# Patient Record
Sex: MALE | Race: WHITE | NOT HISPANIC OR LATINO | Employment: OTHER | URBAN - METROPOLITAN AREA
[De-identification: names, ages, dates, MRNs, and addresses within clinical notes are randomized per-mention and may not be internally consistent; named-entity substitution may affect disease eponyms.]

---

## 2017-01-06 ENCOUNTER — OFFICE VISIT (OUTPATIENT)
Dept: FAMILY MEDICINE | Facility: CLINIC | Age: 67
End: 2017-01-06
Payer: MEDICARE

## 2017-01-06 VITALS
DIASTOLIC BLOOD PRESSURE: 66 MMHG | WEIGHT: 299.19 LBS | TEMPERATURE: 98 F | HEIGHT: 68 IN | BODY MASS INDEX: 45.34 KG/M2 | HEART RATE: 88 BPM | SYSTOLIC BLOOD PRESSURE: 121 MMHG

## 2017-01-06 DIAGNOSIS — G89.29 CHRONIC HIP PAIN, UNSPECIFIED LATERALITY: ICD-10-CM

## 2017-01-06 DIAGNOSIS — M25.559 CHRONIC HIP PAIN, UNSPECIFIED LATERALITY: ICD-10-CM

## 2017-01-06 DIAGNOSIS — I15.2 HYPERTENSION ASSOCIATED WITH DIABETES: ICD-10-CM

## 2017-01-06 DIAGNOSIS — E78.5 HYPERLIPIDEMIA ASSOCIATED WITH TYPE 2 DIABETES MELLITUS: ICD-10-CM

## 2017-01-06 DIAGNOSIS — M54.50 CHRONIC BILATERAL LOW BACK PAIN WITHOUT SCIATICA: ICD-10-CM

## 2017-01-06 DIAGNOSIS — E66.01 OBESITY, CLASS III, BMI 40-49.9 (MORBID OBESITY): ICD-10-CM

## 2017-01-06 DIAGNOSIS — E11.59 HYPERTENSION ASSOCIATED WITH DIABETES: ICD-10-CM

## 2017-01-06 DIAGNOSIS — R10.31 RIGHT GROIN PAIN: ICD-10-CM

## 2017-01-06 DIAGNOSIS — E11.69 HYPERLIPIDEMIA ASSOCIATED WITH TYPE 2 DIABETES MELLITUS: ICD-10-CM

## 2017-01-06 DIAGNOSIS — G89.29 CHRONIC BILATERAL LOW BACK PAIN WITHOUT SCIATICA: ICD-10-CM

## 2017-01-06 DIAGNOSIS — R49.0 HOARSENESS: ICD-10-CM

## 2017-01-06 PROCEDURE — 3074F SYST BP LT 130 MM HG: CPT | Mod: S$GLB,,, | Performed by: FAMILY MEDICINE

## 2017-01-06 PROCEDURE — 4010F ACE/ARB THERAPY RXD/TAKEN: CPT | Mod: S$GLB,,, | Performed by: FAMILY MEDICINE

## 2017-01-06 PROCEDURE — 1125F AMNT PAIN NOTED PAIN PRSNT: CPT | Mod: S$GLB,,, | Performed by: FAMILY MEDICINE

## 2017-01-06 PROCEDURE — 3078F DIAST BP <80 MM HG: CPT | Mod: S$GLB,,, | Performed by: FAMILY MEDICINE

## 2017-01-06 PROCEDURE — 99214 OFFICE O/P EST MOD 30 MIN: CPT | Mod: S$GLB,,, | Performed by: FAMILY MEDICINE

## 2017-01-06 PROCEDURE — 2022F DILAT RTA XM EVC RTNOPTHY: CPT | Mod: S$GLB,,, | Performed by: FAMILY MEDICINE

## 2017-01-06 PROCEDURE — 1157F ADVNC CARE PLAN IN RCRD: CPT | Mod: S$GLB,,, | Performed by: FAMILY MEDICINE

## 2017-01-06 PROCEDURE — 99499 UNLISTED E&M SERVICE: CPT | Mod: S$GLB,,, | Performed by: FAMILY MEDICINE

## 2017-01-06 PROCEDURE — 1160F RVW MEDS BY RX/DR IN RCRD: CPT | Mod: S$GLB,,, | Performed by: FAMILY MEDICINE

## 2017-01-06 PROCEDURE — 1159F MED LIST DOCD IN RCRD: CPT | Mod: S$GLB,,, | Performed by: FAMILY MEDICINE

## 2017-01-06 PROCEDURE — 3045F PR MOST RECENT HEMOGLOBIN A1C LEVEL 7.0-9.0%: CPT | Mod: S$GLB,,, | Performed by: FAMILY MEDICINE

## 2017-01-06 PROCEDURE — 99999 PR PBB SHADOW E&M-EST. PATIENT-LVL III: CPT | Mod: PBBFAC,,, | Performed by: FAMILY MEDICINE

## 2017-01-06 RX ORDER — HYDROCODONE BITARTRATE AND ACETAMINOPHEN 7.5; 325 MG/1; MG/1
1 TABLET ORAL EVERY 6 HOURS PRN
Refills: 0 | COMMUNITY
Start: 2016-11-14 | End: 2017-01-06

## 2017-01-06 RX ORDER — LIDOCAINE 50 MG/G
1 PATCH TOPICAL DAILY
Qty: 30 PATCH | Refills: 11 | Status: SHIPPED | OUTPATIENT
Start: 2017-01-06 | End: 2018-05-28

## 2017-01-06 RX ORDER — TRAMADOL HYDROCHLORIDE 50 MG/1
50 TABLET ORAL EVERY 6 HOURS PRN
Qty: 60 TABLET | Refills: 0 | Status: SHIPPED | OUTPATIENT
Start: 2017-01-06 | End: 2017-01-16

## 2017-01-06 NOTE — PATIENT INSTRUCTIONS
Diabetes (General Information)  Diabetes is a long-term health problem. It means your body does not make enough insulin. Or it may mean that your body cannot use the insulin it makes. Insulin is a hormone in your body. It lets blood sugar (glucose) reach the cells in your body. All of your cells need glucose for fuel.  When you have diabetes, the glucose in your blood builds up because it cannot get into the cells. This buildup is called high blood sugar (hyperglycemia).  Your blood sugar level depends on several things. It depends on what kind of food you eat and how much of it you eat. It also depends on how much exercise you get, and how much insulin you have in your body. Eating too much of the wrong kinds of food or not taking diabetes medicine on time can cause high blood sugar. Infections can cause high blood sugar even if you are taking medicines correctly.  These things can also cause low blood sugar:  · Missing meals  · Not eating enough food  · Taking too much diabetes medicine  Diabetes can cause serious problems over time if you do not get treated. These problems include heart disease, stroke, kidney failure, and blindness. They also include nerve pain or loss of feeling in your legs and feet, and gangrene of the feet. By keeping your blood sugar under control you can prevent or delay these problems.  Normal blood sugar levels are 80 to 100 before a meal and less than 180 in the 1 to 2 hours after a meal.  Home care  Follow these guidelines when caring for yourself at home:  · Follow the diet your healthcare provider gives you. Take insulin or other diabetes medicine exactly as told to.  · Watch your blood sugar as you are told to. Keep a log of your results. This will help your provider change your medicines to keep your blood sugar under control.  · Try to reach your ideal weight. You may be able to cut back on or not have to take diabetes medicine if you eat the right foods and get exercise.  · Do  not smoke. Smoking worsens the effects of diabetes on your circulation. You are much more likely to have a heart attack if you have diabetes and you smoke.  · Take good care of your feet. If you have lost feeling in your feet, you may not see an injury or infection. Check your feet and between your toes at least once a week.  · Wear a medical alert bracelet or necklace, or carry a card in your wallet that says you have diabetes. This will help healthcare providers give you the right care if you get very ill and cannot tell them that you have diabetes.  Sick day plan  If you get a cold, the flu, or a bacterial or viral infection, take these steps:  · Look at your diabetes sick plan and call your healthcare provider as you were told to. You may need to call your provider right away if:  ¨ Your blood sugar is above 240 while taking your diabetes medicine  ¨ Your urine ketone levels are above normal or high  ¨ You have been vomiting more than 6 hours  ¨ You have trouble breathing or your breath ha s a fruity smell  ¨ You have a high fever  ¨ You have a fever for several days and you are not getting better  ¨ You get light-headed and are sleepier than usual  · Keep taking your diabetes pills (oral medicine) even if you have been vomiting and are feeling sick. Call your provider right away because you may need insulin to lower your blood sugar until you recover from your illness.  · Keep taking your insulin even if you have been vomiting and are feeling sick. Call your provider right away to ask if you need to change your insulin dose. This will depend on your blood sugar results.  · Check your blood sugar every 2 to 4 hours, or at least 4 times a day.  · Check your ketones often. If you are vomiting and having diarrhea, watch them more often.  · Do not skip meals. Try to eat small meals on a regular schedule. Do this even if you do not feel like eating.  · Drink water or other liquids that do not have caffeine or  calories. This will keep you from getting dehydrated. If you are nauseated or vomiting, takes small sips every 5 minutes. To prevent dehydration try to drink a cup (8 ounces) of fluids every hour while you are awake.  General care  Always bring a source of fast-acting sugar with you in case you have symptoms of low blood sugar (below 70). At the first sign of low blood sugar, eat or drink 15 to 20 grams of fast-acting sugar to raise your blood sugar. Examples are:  · 3 to 4 glucose tablets. You can buy these at most [facility].  · 4 ounces (1/2 cup) of regular (not diet) soft drinks  · 4 ounces (1/2 cup) of any fruit juice  · 8 ounces (1 cup) of milk  · 5 to 6 pieces of hard candy  · 1 tablespoon of honey  Check your blood sugar 15 minutes after treating yourself. If it is still below 70, take 15 to 20 more grams of fast-acting sugar. Test again in 15 minutes. If it returns to normal (70 or above), eat a snack or meal to keep your blood sugar in a safe range. If it stays low, call your doctor or go to an emergency room.  Follow-up care  Follow-up with your healthcare provider, or as advised. For more information about diabetes, visit the American Diabetes Association website at www.diabetes.org or call 236-311-6671.  When to seek medical advice  Call your healthcare provider right away if you have any of these symptoms of high blood sugar:  · Frequent urination  · Dizziness  · Drowsiness  · Thirst  · Headache  · Nausea or vomiting  · Abdominal pain  · Eyesight changes  · Fast breathing  · Confusion or loss of consciousness  Also call your provider right away if you have any of these signs of low blood sugar:  · Fatigue  · Headache  · Shakes  · Excess sweating  · Hunger  · Feeling anxious or restless  · Eyesight changes  · Drowsiness  · Weakness  · Confusion or loss of consciousness  Call 911  Call for emergency help right away if any of these occur:  · Chest pain or shortness of breath  · Dizziness or  fainting  · Weakness of an arm or leg or one side of the face  · Trouble speaking or seeing   © 3250-8191 [employer]. [address] [place] [address], [place] [address]. All rights reserved. This information is not intended as a substitute for professional medical care. Always follow your healthcare professional's instructions.        Weight Management: Getting Started  Healthy bodies come in all shapes and sizes. Not all bodies are made to be thin. For some people, a healthy weight is higher than the average weight listed on weight charts. Your healthcare provider can help you decide on a healthy weight for you.    Reasons to lose weight  Losing weight can help with some health problems, such as high blood pressure, heart disease, diabetes, sleep apnea, and arthritis. You may also feel more energy.  Set your long-term goal  Your goal doesn't even have to be a specific weight. You may decide on a fitness goal (such as being able to walk 10 miles a week), or a health goal (such as lowering your blood pressure). Choose a goal that is measurable and reasonable, so you know when you've reached it. A goal of reaching a BMI of less than 25 is not always reasonable (or possible).   Make an action plan  Habits dont change overnight. Setting your goals too high can leave you feeling discouraged if you cant reach them. Be realistic. Choose one or two small changes you can make now. Set an action plan for how you are going to make these changes. When you can stick to this plan, keep making a few more small changes. Taking small steps will help you stay on the path to success.  Track your progress  Write down your goals. Then, keep a daily record of your progress. Write down what you eat and how active you are. This record lets you look back on how much youve done. It may also help when youre feeling frustrated. Reward yourself for success. Even if you dont reach every goal, give yourself credit for what you do get  done.  Get support  Encouragement from others can help make losing weight easier. Ask your family members and friends for support. They may even want to join you. Also look to your healthcare provider, registered dietitian, and  for help. Your local hospital can give you more information about nutrition, exercise, and weight loss.  © 4900-3203 [employer]. [address] [place] [address], [place] [address]. All rights reserved. This information is not intended as a substitute for professional medical care. Always follow your healthcare professional's instructions.        Walking for Fitness  Fitness walking has something for everyone, even people who are already fit. Walking is one of the safest ways to condition your body aerobically. It can boost energy, help you lose weight, and reduce stress.    Physical benefits  · Walking strengthens your heart and lungs, and tones your muscles.  · When walking, your feet land with less impact than in other sports. This reduces chances of muscle, bone, and joint injury.  · Regular walking improves your cholesterol levels and lowers your risk of heart disease. And it helps you control your blood sugar if you have diabetes.  · Walking is a weight-bearing activity, which helps maintain bone density. This can help prevent osteoporosis.  Personal rewards  · Taking walks can help you relax and manage stress. And fitness walking may make you feel better about yourself.  · Walking can help you sleep better at night and make you less likely to be depressed.  · Regular walking may help maintain your memory as you get older.  · Walking is a great way to spend extra time with friends and family members. Be sure to invite your dog along!  Q&A about fitness walking  Q: Will walking keep me fit?  A: Yes. Regular walking at the right pace gives you all the benefits of other aerobic activities, such as jogging and swimming.  Q: Will walking help me lose weight and keep  it off?  A: Yes. Per mile, walking can burn as many calories as jogging. Your health care provider can help work walking into your weight-loss plan.  Q: Is walking safe for my health?  A: Yes. Walking is safe if you have high blood pressure, diabetes, heart disease, or other conditions. Talk to your health care provider before you start.  © 6573-2128 [employer]. [address] [place] [address], [place] [address]. All rights reserved. This information is not intended as a substitute for professional medical care. Always follow your healthcare professional's instructions.

## 2017-01-06 NOTE — MR AVS SNAPSHOT
[facility]  [address]  [place] [address]  Phone: [phone]  Fax: [phone]                  Myron Noel   2017 1:20 PM   Office Visit    Description:  Male : 1950   Provider:  Adelaida Mckoy MD   Department:  [facility]           Reason for Visit     Follow-up           Diagnoses this Visit        Comments    Uncontrolled type 2 diabetes mellitus with microalbuminuria, without long-term current use of insulin    -  Primary     Hyperlipidemia associated with type 2 diabetes mellitus         Hypertension associated with diabetes         Obesity, Class III, BMI 40-49.9 (morbid obesity)         Chronic bilateral low back pain without sciatica         Chronic hip pain, unspecified laterality         Hoarseness         Right groin pain                To Do List           Future Appointments        Provider Department Dept Phone    2017 8:30 AM LAB, MERA SAT [facility] - Lab [phone]    2017 9:30 AM SLIC US1 [facility]- Ultrasound [phone]    2017 11:00 AM Jeri Carmona PA-C [facility] Back [facility] [phone]    2017 3:00 PM Adelaida Mckoy MD [facility] [phone]      Goals (5 Years of Data)     None      Follow-Up and Disposition     Return in about 6 months (around 2017) for diabetes.    Follow-up and Disposition History       These Medications        Disp Refills Start End    tramadol (ULTRAM) 50 mg tablet 60 tablet 0 2017    Take 1 tablet (50 mg total) by mouth every 6 (six) hours as needed for Pain. - Oral    Pharmacy: [facility]/pharmacy #[id number] - [place] -  [address] Ph #: [phone]       lidocaine (LIDODERM) 5 % 30 patch 11 2017     Place 1 patch onto the skin once daily. Remove & Discard patch within 12 hours or as directed by MD - Transdermal    Pharmacy: [facility]/pharmacy #[id number] - [place]  [address] Ph #: [phone]         [facility]      [facility] - 24/7 Assistance  Registered nurses in the [facility] provide clinical advisement, health education, appointment booking, and other advisory services.  Call for this free service at [phone].             Medications           Message regarding Medications     Verify the changes and/or additions to your medication regime listed below are the same as discussed with your clinician today.  If any of these changes or additions are incorrect, please notify your healthcare provider.        START taking these NEW medications        Refills    tramadol (ULTRAM) 50 mg tablet 0    Sig: Take 1 tablet (50 mg total) by mouth every 6 (six) hours as needed for Pain.    Class: Normal    Route: Oral    lidocaine (LIDODERM) 5 % 11    Sig: Place 1 patch onto the skin once daily. Remove & Discard patch within 12 hours or as directed by MD    Class: Normal    Route: Transdermal      STOP taking these medications     amoxicillin (AMOXIL) 500 MG capsule TAKE ONE CAPSULE BY MOUTH 4 TIMES A DAY UNTIL GONE    hydrocodone-acetaminophen 7.5-325mg (NORCO) 7.5-325 mg per tablet Take 1 tablet by mouth every 6 (six) hours as needed.           Verify that the below list of medications is an accurate representation of the medications you are currently taking.  If none reported, the list may be blank. If incorrect, please contact your healthcare provider. Carry this list with you in case of emergency.           Current Medications     allopurinol (ZYLOPRIM) 300 MG tablet TAKE 1 TABET BY MOUTH 2 TIMES A DAY    aspirin 81 mg Tab Take by mouth.    atorvastatin (LIPITOR) 20 MG tablet Take 1 tablet (20 mg total) by mouth once daily.    blood sugar diagnostic (TRUETEST TEST STRIPS) Strp 1 strip by Misc.(Non-Drug; Combo Route) route once daily.    blood-glucose meter (TRUERESULT BLOOD GLUCOSE SYSTM) kit Use as instructed    fenofibrate 160 MG Tab Take 1 tablet (160 mg total) by mouth once daily.     "fluticasone (FLONASE) 50 mcg/actuation nasal spray 1 spray by Each Nare route once daily.    furosemide (LASIX) 20 MG tablet Take 20 mg by mouth 2 (two) times daily.    glipiZIDE (GLUCOTROL) 10 MG tablet Take 1 tablet (10 mg total) by mouth 2 (two) times daily before meals.    lancets 28 gauge Misc 1 lancet by Misc.(Non-Drug; Combo Route) route once daily.    lisinopril (PRINIVIL,ZESTRIL) 20 MG tablet TAKE 1 TABLET DAILY    SITagliptan (JANUVIA) 100 MG Tab Take 1 tablet (100 mg total) by mouth once daily.    VITAMIN D2 50,000 unit capsule Take 50,000 Units by mouth every 7 days.    acetaminophen (TYLENOL EXTRA STRENGTH) 500 MG tablet Take 500 mg by mouth every 6 (six) hours as needed for Pain.    lidocaine (LIDODERM) 5 % Place 1 patch onto the skin once daily. Remove & Discard patch within 12 hours or as directed by MD    tramadol (ULTRAM) 50 mg tablet Take 1 tablet (50 mg total) by mouth every 6 (six) hours as needed for Pain.           Clinical Reference Information           Vital Signs - Last Recorded  Most recent update: 1/6/2017  1:24 PM by Annalee Mccauley MA    BP Pulse Temp Ht Wt BMI    121/66 88 98 °F (36.7 °C) (Oral) 5' 8" (1.727 m) 135.7 kg (299 lb 2.6 oz) 45.49 kg/m2      Blood Pressure          Most Recent Value    BP  121/66      Allergies as of 1/6/2017     Venom-honey Bee      Immunizations Administered on Date of Encounter - 1/6/2017     None      Orders Placed During Today's Visit      Normal Orders This Visit    Ambulatory Referral to [facility] Sp[facility] Clinic     Ambulatory referral to ENT     Future Labs/Procedures Expected by Expires    Comprehensive metabolic panel  1/6/2017 1/6/2018    Hemoglobin A1c  1/6/2017 3/7/2018    Lipid panel  1/6/2017 1/6/2018    US Abdomen Limited  1/6/2017 1/6/2018      Instructions      Diabetes (General Information)  Diabetes is a long-term health problem. It means your body does not make enough insulin. Or it may mean that your body cannot use the insulin it makes. " Insulin is a hormone in your body. It lets blood sugar (glucose) reach the cells in your body. All of your cells need glucose for fuel.  When you have diabetes, the glucose in your blood builds up because it cannot get into the cells. This buildup is called high blood sugar (hyperglycemia).  Your blood sugar level depends on several things. It depends on what kind of food you eat and how much of it you eat. It also depends on how much exercise you get, and how much insulin you have in your body. Eating too much of the wrong kinds of food or not taking diabetes medicine on time can cause high blood sugar. Infections can cause high blood sugar even if you are taking medicines correctly.  These things can also cause low blood sugar:  · Missing meals  · Not eating enough food  · Taking too much diabetes medicine  Diabetes can cause serious problems over time if you do not get treated. These problems include heart disease, stroke, kidney failure, and blindness. They also include nerve pain or loss of feeling in your legs and feet, and gangrene of the feet. By keeping your blood sugar under control you can prevent or delay these problems.  Normal blood sugar levels are 80 to 100 before a meal and less than 180 in the 1 to 2 hours after a meal.  Home care  Follow these guidelines when caring for yourself at home:  · Follow the diet your healthcare provider gives you. Take insulin or other diabetes medicine exactly as told to.  · Watch your blood sugar as you are told to. Keep a log of your results. This will help your provider change your medicines to keep your blood sugar under control.  · Try to reach your ideal weight. You may be able to cut back on or not have to take diabetes medicine if you eat the right foods and get exercise.  · Do not smoke. Smoking worsens the effects of diabetes on your circulation. You are much more likely to have a heart attack if you have diabetes and you smoke.  · Take good care of your  feet. If you have lost feeling in your feet, you may not see an injury or infection. Check your feet and between your toes at least once a week.  · Wear a medical alert bracelet or necklace, or carry a card in your wallet that says you have diabetes. This will help healthcare providers give you the right care if you get very ill and cannot tell them that you have diabetes.  Sick day plan  If you get a cold, the flu, or a bacterial or viral infection, take these steps:  · Look at your diabetes sick plan and call your healthcare provider as you were told to. You may need to call your provider right away if:  ¨ Your blood sugar is above 240 while taking your diabetes medicine  ¨ Your urine ketone levels are above normal or high  ¨ You have been vomiting more than 6 hours  ¨ You have trouble breathing or your breath ha s a fruity smell  ¨ You have a high fever  ¨ You have a fever for several days and you are not getting better  ¨ You get light-headed and are sleepier than usual  · Keep taking your diabetes pills (oral medicine) even if you have been vomiting and are feeling sick. Call your provider right away because you may need insulin to lower your blood sugar until you recover from your illness.  · Keep taking your insulin even if you have been vomiting and are feeling sick. Call your provider right away to ask if you need to change your insulin dose. This will depend on your blood sugar results.  · Check your blood sugar every 2 to 4 hours, or at least 4 times a day.  · Check your ketones often. If you are vomiting and having diarrhea, watch them more often.  · Do not skip meals. Try to eat small meals on a regular schedule. Do this even if you do not feel like eating.  · Drink water or other liquids that do not have caffeine or calories. This will keep you from getting dehydrated. If you are nauseated or vomiting, takes small sips every 5 minutes. To prevent dehydration try to drink a cup (8 ounces) of fluids  every hour while you are awake.  General care  Always bring a source of fast-acting sugar with you in case you have symptoms of low blood sugar (below 70). At the first sign of low blood sugar, eat or drink 15 to 20 grams of fast-acting sugar to raise your blood sugar. Examples are:  · 3 to 4 glucose tablets. You can buy these at most drugstores.  · 4 ounces (1/2 cup) of regular (not diet) soft drinks  · 4 ounces (1/2 cup) of any fruit juice  · 8 ounces (1 cup) of milk  · 5 to 6 pieces of hard candy  · 1 tablespoon of honey  Check your blood sugar 15 minutes after treating yourself. If it is still below 70, take 15 to 20 more grams of fast-acting sugar. Test again in 15 minutes. If it returns to normal (70 or above), eat a snack or meal to keep your blood sugar in a safe range. If it stays low, call your doctor or go to an emergency room.  Follow-up care  Follow-up with your healthcare provider, or as advised. For more information about diabetes, visit the American Diabetes Association website at www.diabetes.org or call 151-031-3439.  When to seek medical advice  Call your healthcare provider right away if you have any of these symptoms of high blood sugar:  · Frequent urination  · Dizziness  · Drowsiness  · Thirst  · Headache  · Nausea or vomiting  · Abdominal pain  · Eyesight changes  · Fast breathing  · Confusion or loss of consciousness  Also call your provider right away if you have any of these signs of low blood sugar:  · Fatigue  · Headache  · Shakes  · Excess sweating  · Hunger  · Feeling anxious or restless  · Eyesight changes  · Drowsiness  · Weakness  · Confusion or loss of consciousness  Call 911  Call for emergency help right away if any of these occur:  · Chest pain or shortness of breath  · Dizziness or fainting  · Weakness of an arm or leg or one side of the face  · Trouble speaking or seeing   © 0693-4420 [employer]. [address] [place] [address], [place] [address]. All rights reserved.  This information is not intended as a substitute for professional medical care. Always follow your healthcare professional's instructions.        Weight Management: Getting Started  Healthy bodies come in all shapes and sizes. Not all bodies are made to be thin. For some people, a healthy weight is higher than the average weight listed on weight charts. Your healthcare provider can help you decide on a healthy weight for you.    Reasons to lose weight  Losing weight can help with some health problems, such as high blood pressure, heart disease, diabetes, sleep apnea, and arthritis. You may also feel more energy.  Set your long-term goal  Your goal doesn't even have to be a specific weight. You may decide on a fitness goal (such as being able to walk 10 miles a week), or a health goal (such as lowering your blood pressure). Choose a goal that is measurable and reasonable, so you know when you've reached it. A goal of reaching a BMI of less than 25 is not always reasonable (or possible).   Make an action plan  Habits dont change overnight. Setting your goals too high can leave you feeling discouraged if you cant reach them. Be realistic. Choose one or two small changes you can make now. Set an action plan for how you are going to make these changes. When you can stick to this plan, keep making a few more small changes. Taking small steps will help you stay on the path to success.  Track your progress  Write down your goals. Then, keep a daily record of your progress. Write down what you eat and how active you are. This record lets you look back on how much youve done. It may also help when youre feeling frustrated. Reward yourself for success. Even if you dont reach every goal, give yourself credit for what you do get done.  Get support  Encouragement from others can help make losing weight easier. Ask your family members and friends for support. They may even want to join you. Also look to your healthcare  provider, registered dietitian, and  for help. Your local hospital can give you more information about nutrition, exercise, and weight loss.  © 8069-6078 [employer]. [address] [place] [address], [place] [address]. All rights reserved. This information is not intended as a substitute for professional medical care. Always follow your healthcare professional's instructions.        Walking for Fitness  Fitness walking has something for everyone, even people who are already fit. Walking is one of the safest ways to condition your body aerobically. It can boost energy, help you lose weight, and reduce stress.    Physical benefits  · Walking strengthens your heart and lungs, and tones your muscles.  · When walking, your feet land with less impact than in other sports. This reduces chances of muscle, bone, and joint injury.  · Regular walking improves your cholesterol levels and lowers your risk of heart disease. And it helps you control your blood sugar if you have diabetes.  · Walking is a weight-bearing activity, which helps maintain bone density. This can help prevent osteoporosis.  Personal rewards  · Taking walks can help you relax and manage stress. And fitness walking may make you feel better about yourself.  · Walking can help you sleep better at night and make you less likely to be depressed.  · Regular walking may help maintain your memory as you get older.  · Walking is a great way to spend extra time with friends and family members. Be sure to invite your dog along!  Q&A about fitness walking  Q: Will walking keep me fit?  A: Yes. Regular walking at the right pace gives you all the benefits of other aerobic activities, such as jogging and swimming.  Q: Will walking help me lose weight and keep it off?  A: Yes. Per mile, walking can burn as many calories as jogging. Your health care provider can help work walking into your weight-loss plan.  Q: Is walking safe for my health?  A:  Yes. Walking is safe if you have high blood pressure, diabetes, heart disease, or other conditions. Talk to your health care provider before you start.  © 5609-2590 [employer]. [address] [place] [address], [place] [address]. All rights reserved. This information is not intended as a substitute for professional medical care. Always follow your healthcare professional's instructions.

## 2017-01-06 NOTE — PROGRESS NOTES
CHIEF COMPLAINT: follow up      HISTORY OF PRESENT ILLNESS:  Myron Noel is a 66 y.o. male patient who presents to clinic for follow up on his chronic medical conditions.     1. Type 2 DM: recent HgA1c was 7.4%. He is on januvia, glucotrol. He is on an ACE-I, statin, ASA. He had evidence of microalbuminuria and is established with Dr. De Anda. He is up to date on his immunizations. He follows up with an opthalmologist    2. Hyperlipidemia: he is on lipitor, fenofibrate, ASA. He denies any dark colored urine, myalgia. He is due for labs    3. HTN: patient is on lisinopril 40 mg daily and lasix. He denies any CP, cough, SOB.     4. He requests referral to ENT for evaluation of chronic sinusitis, hoarseness    5. He continues to have low back pain and hip pain and states that he will sometimes have pain in the right inguinal area, he denies any bulging.   REVIEW OF SYSTEMS:  The patient denies any fever, chills, night sweats, headaches, vision changes, difficulty speaking or swallowing, decreased hearing, weight loss, weight gain, chest pain, palpitations, shortness of breath, cough, nausea, vomiting, abdominal pain, dysuria, diarrhea, constipation, hematuria, hematochezia, melena, changes in his hair, skin,, numbness or weakness in his extremities, over any of his joints, myalgia, swollen glands, easy bruising, fatigue, edema.       MEDICATIONS:   Reviewed and/or reconciled in EPIC    ALLERGIES:  Reviewed and/or reconciled in [facility]    PAST MEDICAL/SURGICAL HISTORY:   Past Medical History   Diagnosis Date    Cataract      ou done//    Chronic kidney disease 2001     nephrectomy for obstructive stones    Corneal abrasion, left      With leaf 40 yrs ago    Diabetes mellitus     Gout, chronic     Hypertension     Knee osteoarthritis 12/20/2013      Past Surgical History   Procedure Laterality Date    Appendectomy      Tonsillectomy      Nephrectomy      Eye surgery      Cataract extraction  08/06/2014     od     "Cataract extraction w/  intraocular lens implant Left 11/5/14       FAMILY HISTORY:    Family History   Problem Relation Age of Onset    Heart defect Father     Alzheimer's disease Maternal Aunt     Diabetes Maternal Grandmother     Melanoma Neg Hx     Psoriasis Neg Hx     Lupus Neg Hx     Eczema Neg Hx        SOCIAL HISTORY:    Social History     Social History    Marital status: Single     Spouse name: N/A    Number of children: N/A    Years of education: N/A     Occupational History    Not on file.     Social History Main Topics    Smoking status: Former Smoker     Types: Pipe    Smokeless tobacco: Never Used    Alcohol use No    Drug use: No    Sexual activity: Yes     Partners: Female     Other Topics Concern    Not on file     Social History Narrative       PHYSICAL EXAM:  VITAL SIGNS:   Vitals:    01/06/17 1317   BP: 121/66   Pulse: 88   Temp: 98 °F (36.7 °C)   TempSrc: Oral   Weight: 135.7 kg (299 lb 2.6 oz)   Height: 5' 8" (1.727 m)     GENERAL:  Patient appears well nourished, sitting on exam table, in no acute distress.  HEENT:  Atraumatic, normocephalic, PERRLA, EOMI, no conjunctival injection, sclerae are anicteric, normal external auditory canals,TMs clear b/l, gross hearing intact to whisper, MMM, no oropharygneal erythema or exudate.  NECK:  Supple, normal ROM, trachea is midline , no supraclavicular or cervical LAD or masses palpated.   CARDIOVASCULAR:  RRR, normal S1 and S2, no m/r/g.  RESPIRATORY:  CTA b/l, no wheezes, rhonchi, rales.  No increased work of breathing, no  use of accessory muscles.  ABDOMEN:  Soft, nontender, nondistended, normoactive bowel sounds in all four quadrants, no rebound or guarding, no HSM or masses palpated.  Normal percussion.  EXTREMITIES:  2+ DP pulses b/l, no edema.  SKIN:  Warm, no lesions on exposed skin.  NEUROMUSCULAR:  Cranial nerves II-XII grossly intact. There is redness, swelling, erythema over dorsal surface of left foot. No clubbing or " cyanosis of digits/nails, there is onychomycosis of finger and toenails  Steady gait.  PSYCH:  Patient is alert and oriented to person, time, place. They are appropriately dressed and groomed. There is normal eye contact. Rate and tone of speech is normal. Normal insight, judgement. Normal thought content and process.       LABORATORY/IMAGING STUDIES: pending    ASSESSMENT/PLAN: This is a 66 y.o. male who presents to clinic for evaluation of the following concerns  1. Type 2 DM with renal manifestations: see below  2. Hyperlipidemia: obtain CMP, lipid panel  3. HTN: see below  4. Obesity: see below  5. Inguinal pain: obtain ultrasound to evaluate for hernia  6. Hip pain, chronic low back pain: will refer to back and spine center, can continue with tylenol, prn tramadol and will also place on trial of lidoderm patches.  7. Hoarseness: refer to ENT    Patient readiness: acceptance and barriers:none    During the course of the visit the patient was educated and counseled about the following:     Diabetes:  HgA1c,lipid panel  Hypertension: continue with current medications.  Obesity:   General weight loss/lifestyle modification strategies discussed (elicit support from others; identify saboteurs; non-food rewards, etc).  Diet interventions: moderate (500 kCal/d) deficit diet.  Informal exercise measures discussed, e.g. taking stairs instead of elevator.  Regular aerobic exercise program discussed.    Goals: Diabetes: Maintain Hemoglobin A1C below 7, Hypertension: Reduce Blood Pressure and Obesity: Reduce calorie intake and BMI    Did patient meet goals/outcomes: No    The following self management tools provided: declined    Patient Instructions (the written plan) was given to the patient/family.     Time spent with patient: 30 minutes      FOLLOW UP: 3 months      Adelaida Mckoy MD

## 2017-01-12 ENCOUNTER — TELEPHONE (OUTPATIENT)
Dept: FAMILY MEDICINE | Facility: CLINIC | Age: 67
End: 2017-01-12

## 2017-01-12 ENCOUNTER — HOSPITAL ENCOUNTER (OUTPATIENT)
Dept: RADIOLOGY | Facility: HOSPITAL | Age: 67
Discharge: HOME | End: 2017-01-12
Attending: NEUROLOGICAL SURGERY
Payer: MEDICARE

## 2017-01-12 ENCOUNTER — HOSPITAL ENCOUNTER (OUTPATIENT)
Dept: RADIOLOGY | Facility: CLINIC | Age: 67
Discharge: HOME | End: 2017-01-12
Attending: FAMILY MEDICINE
Payer: MEDICARE

## 2017-01-12 ENCOUNTER — OFFICE VISIT (OUTPATIENT)
Dept: ORTHOPEDIC SURGERY | Facility: CLINIC | Age: 67
End: 2017-01-12
Payer: MEDICARE

## 2017-01-12 VITALS
SYSTOLIC BLOOD PRESSURE: 130 MMHG | BODY MASS INDEX: 43.72 KG/M2 | WEIGHT: 295.19 LBS | HEIGHT: 69 IN | HEART RATE: 85 BPM | DIASTOLIC BLOOD PRESSURE: 76 MMHG

## 2017-01-12 DIAGNOSIS — M47.816 SPONDYLOSIS OF LUMBAR REGION WITHOUT MYELOPATHY OR RADICULOPATHY: ICD-10-CM

## 2017-01-12 DIAGNOSIS — M25.551 PAIN OF RIGHT HIP JOINT: ICD-10-CM

## 2017-01-12 DIAGNOSIS — M25.551 PAIN OF RIGHT HIP JOINT: Primary | ICD-10-CM

## 2017-01-12 DIAGNOSIS — R10.31 RIGHT GROIN PAIN: ICD-10-CM

## 2017-01-12 PROCEDURE — 1160F RVW MEDS BY RX/DR IN RCRD: CPT | Mod: S$GLB,,, | Performed by: PHYSICIAN ASSISTANT

## 2017-01-12 PROCEDURE — 1125F AMNT PAIN NOTED PAIN PRSNT: CPT | Mod: S$GLB,,, | Performed by: PHYSICIAN ASSISTANT

## 2017-01-12 PROCEDURE — 1159F MED LIST DOCD IN RCRD: CPT | Mod: S$GLB,,, | Performed by: PHYSICIAN ASSISTANT

## 2017-01-12 PROCEDURE — 76705 ECHO EXAM OF ABDOMEN: CPT | Mod: 26,,, | Performed by: RADIOLOGY

## 2017-01-12 PROCEDURE — 72114 X-RAY EXAM L-S SPINE BENDING: CPT | Mod: TC

## 2017-01-12 PROCEDURE — 99203 OFFICE O/P NEW LOW 30 MIN: CPT | Mod: S$GLB,,, | Performed by: PHYSICIAN ASSISTANT

## 2017-01-12 PROCEDURE — 1157F ADVNC CARE PLAN IN RCRD: CPT | Mod: S$GLB,,, | Performed by: PHYSICIAN ASSISTANT

## 2017-01-12 PROCEDURE — 3078F DIAST BP <80 MM HG: CPT | Mod: S$GLB,,, | Performed by: PHYSICIAN ASSISTANT

## 2017-01-12 PROCEDURE — 99999 PR PBB SHADOW E&M-EST. PATIENT-LVL IV: CPT | Mod: PBBFAC,,, | Performed by: PHYSICIAN ASSISTANT

## 2017-01-12 PROCEDURE — 3075F SYST BP GE 130 - 139MM HG: CPT | Mod: S$GLB,,, | Performed by: PHYSICIAN ASSISTANT

## 2017-01-12 PROCEDURE — 72114 X-RAY EXAM L-S SPINE BENDING: CPT | Mod: 26,,, | Performed by: RADIOLOGY

## 2017-01-12 NOTE — PROGRESS NOTES
Neurosurgery History & Physical    Patient ID: Myron Noel is a 66 y.o. male.    Chief Complaint   Patient presents with    Hip Pain     right       Review of Systems   Constitutional: Negative for activity change, chills, fatigue and unexpected weight change.   HENT: Negative for hearing loss, tinnitus, trouble swallowing and voice change.    Eyes: Negative for visual disturbance.   Respiratory: Negative for apnea, chest tightness and shortness of breath.    Cardiovascular: Negative for chest pain and palpitations.   Gastrointestinal: Negative for abdominal pain, constipation, diarrhea, nausea and vomiting.   Genitourinary: Negative for difficulty urinating, dysuria and frequency.   Musculoskeletal: Positive for arthralgias. Negative for back pain, gait problem, neck pain and neck stiffness.   Skin: Negative for wound.   Neurological: Negative for dizziness, tremors, seizures, facial asymmetry, speech difficulty, weakness, light-headedness, numbness and headaches.   Psychiatric/Behavioral: Negative for confusion and decreased concentration.       Past Medical History   Diagnosis Date    Cataract      ou done//    Chronic kidney disease 2001     nephrectomy for obstructive stones    Corneal abrasion, left      With leaf 40 yrs ago    Diabetes mellitus     Gout, chronic     Hypertension     Knee osteoarthritis 12/20/2013     Social History     Social History    Marital status: Single     Spouse name: N/A    Number of children: N/A    Years of education: N/A     Occupational History    Not on file.     Social History Main Topics    Smoking status: Former Smoker     Types: Pipe    Smokeless tobacco: Never Used    Alcohol use No    Drug use: No    Sexual activity: Yes     Partners: Female     Other Topics Concern    Not on file     Social History Narrative     Family History   Problem Relation Age of Onset    Heart defect Father     Alzheimer's disease Maternal Aunt     Diabetes Maternal Grandmother      Melanoma Neg Hx     Psoriasis Neg Hx     Lupus Neg Hx     Eczema Neg Hx      Review of patient's allergies indicates:   Allergen Reactions    Venom-honey bee Other (See Comments)     Passed out       Current Outpatient Prescriptions:     acetaminophen (TYLENOL EXTRA STRENGTH) 500 MG tablet, Take 500 mg by mouth every 6 (six) hours as needed for Pain., Disp: , Rfl:     allopurinol (ZYLOPRIM) 300 MG tablet, TAKE 1 TABET BY MOUTH 2 TIMES A DAY, Disp: 180 tablet, Rfl: 3    aspirin 81 mg Tab, Take by mouth., Disp: , Rfl:     atorvastatin (LIPITOR) 20 MG tablet, Take 1 tablet (20 mg total) by mouth once daily., Disp: 90 tablet, Rfl: 3    blood sugar diagnostic (TRUETEST TEST STRIPS) Strp, 1 strip by Misc.(Non-Drug; Combo Route) route once daily., Disp: 100 strip, Rfl: 6    blood-glucose meter (TRUERESULT BLOOD GLUCOSE SYSTM) kit, Use as instructed, Disp: 1 each, Rfl: 0    fenofibrate 160 MG Tab, Take 1 tablet (160 mg total) by mouth once daily., Disp: 90 tablet, Rfl: 3    fluticasone (FLONASE) 50 mcg/actuation nasal spray, 1 spray by Each Nare route once daily., Disp: 1 Bottle, Rfl: 11    furosemide (LASIX) 20 MG tablet, Take 20 mg by mouth 2 (two) times daily., Disp: , Rfl:     glipiZIDE (GLUCOTROL) 10 MG tablet, Take 1 tablet (10 mg total) by mouth 2 (two) times daily before meals., Disp: 180 tablet, Rfl: 3    lancets 28 gauge Misc, 1 lancet by Misc.(Non-Drug; Combo Route) route once daily., Disp: 100 each, Rfl: 5    lidocaine (LIDODERM) 5 %, Place 1 patch onto the skin once daily. Remove & Discard patch within 12 hours or as directed by MD, Disp: 30 patch, Rfl: 11    lisinopril (PRINIVIL,ZESTRIL) 20 MG tablet, TAKE 1 TABLET DAILY, Disp: 30 tablet, Rfl: 11    SITagliptan (JANUVIA) 100 MG Tab, Take 1 tablet (100 mg total) by mouth once daily., Disp: 90 tablet, Rfl: 3    tramadol (ULTRAM) 50 mg tablet, Take 1 tablet (50 mg total) by mouth every 6 (six) hours as needed for Pain., Disp: 60 tablet,  "Rfl: 0    VITAMIN D2 50,000 unit capsule, Take 50,000 Units by mouth every 7 days., Disp: , Rfl: 0    Vitals:    01/12/17 1052   BP: 130/76   BP Location: Left arm   Patient Position: Sitting   BP Method: Automatic   Pulse: 85   Weight: 133.9 kg (295 lb 3.1 oz)   Height: 5' 9" (1.753 m)       Physical Exam   Constitutional: He is oriented to person, place, and time. He appears well-developed and well-nourished.   HENT:   Head: Normocephalic and atraumatic.   Eyes: Pupils are equal, round, and reactive to light.   Neck: Normal range of motion. Neck supple.   Cardiovascular: Normal rate.    Pulmonary/Chest: Effort normal.   Abdominal: He exhibits no distension.   Musculoskeletal: Normal range of motion. He exhibits no edema.   Tender to palpation over the right hip joint.   Neurological: He is alert and oriented to person, place, and time. He has a normal Finger-Nose-Finger Test, a normal Heel to Shin Test, a normal Romberg Test and a normal Tandem Gait Test. Gait normal.   Reflex Scores:       Tricep reflexes are 1+ on the right side and 1+ on the left side.       Bicep reflexes are 1+ on the right side and 1+ on the left side.       Brachioradialis reflexes are 1+ on the right side and 1+ on the left side.       Patellar reflexes are 1+ on the right side and 1+ on the left side.       Achilles reflexes are 1+ on the right side and 1+ on the left side.  Skin: Skin is warm and dry.   Psychiatric: He has a normal mood and affect. His speech is normal and behavior is normal. Judgment and thought content normal.   Nursing note and vitals reviewed.      Neurologic Exam     Mental Status   Oriented to person, place, and time.   Oriented to person.   Oriented to place.   Oriented to time.   Follows 3 step commands.   Attention: normal. Concentration: normal.   Speech: speech is normal   Level of consciousness: alert  Knowledge: consistent with education.   Able to name object. Able to read. Able to repeat. Able to write. " Normal comprehension.     Cranial Nerves     CN II   Visual acuity: normal  Right visual field deficit: none  Left visual field deficit: none     CN III, IV, VI   Pupils are equal, round, and reactive to light.  Right pupil: Size: 3 mm. Shape: regular. Reactivity: brisk. Consensual response: intact.   Left pupil: Size: 3 mm. Shape: regular. Reactivity: brisk. Consensual response: intact.   CN III: no CN III palsy  CN VI: no CN VI palsy  Nystagmus: none   Diplopia: none  Ophthalmoparesis: none  Conjugate gaze: present    CN V   Right facial sensation deficit: none  Left facial sensation deficit: none    CN VII   Right facial weakness: none  Left facial weakness: none    CN VIII   Hearing: intact    CN IX, X   CN IX normal.   CN X normal.     CN XI   Right sternocleidomastoid strength: normal  Left sternocleidomastoid strength: normal  Right trapezius strength: normal  Left trapezius strength: normal    CN XII   Fasciculations: absent  Tongue deviation: none    Motor Exam   Muscle bulk: antalgic.  Overall muscle tone: normal  Right arm pronator drift: absent  Left arm pronator drift: absent    Strength   Right neck flexion: 5/5  Left neck flexion: 5/5  Right neck extension: 5/5  Left neck extension: 5/5  Right deltoid: 5/5  Left deltoid: 5/5  Right biceps: 5/5  Left biceps: 5/5  Right triceps: 5/5  Left triceps: 5/5  Right wrist flexion: 5/5  Left wrist flexion: 5/5  Right wrist extension: 5/5  Left wrist extension: 5/5  Right interossei: 5/5  Left interossei: 5/5  Right abdominals: 5/5  Left abdominals: 5/5  Right iliopsoas: 5/5  Left iliopsoas: 5/5  Right quadriceps: 5/5  Left quadriceps: 5/5  Right hamstrin/5  Left hamstrin/5  Right glutei: 5/5  Left glutei: 5/5  Right anterior tibial: 5/5  Left anterior tibial: 5/5  Right posterior tibial: 5/5  Left posterior tibial: 5/5  Right peroneal: 5/5  Left peroneal: 5/5  Right gastroc: 5/5  Left gastroc: 5/5    Sensory Exam   Right arm light touch: normal  Left arm  light touch: normal  Right leg light touch: normal  Left leg light touch: normal  Right arm vibration: normal  Left arm vibration: normal  Right arm pinprick: normal  Left arm pinprick: normal    Gait, Coordination, and Reflexes     Gait  Gait: normal    Coordination   Romberg: negative  Finger to nose coordination: normal  Heel to shin coordination: normal  Tandem walking coordination: normal    Tremor   Resting tremor: absent  Intention tremor: absent  Action tremor: absent    Reflexes   Right brachioradialis: 1+  Left brachioradialis: 1+  Right biceps: 1+  Left biceps: 1+  Right triceps: 1+  Left triceps: 1+  Right patellar: 1+  Left patellar: 1+  Right achilles: 1+  Left achilles: 1+  Right Vanegas: absent  Left Vanegas: absent  Right ankle clonus: absent  Left ankle clonus: absent      Provider dictation:  66 year old obese male with history of hypertension, diabetes, kidney removed in 2011 is referred by Dr. Mckoy for evaluation of right hip pain.  Pain started in the fall of 2016 without traumatic event.  He feels pain focalized over the right hip joint and right groin.  He dennies radicular pain connecting the two locations and instead describes two distinct focuses of pain.  He denies lower back pain, numbness and tingling.  Pain increases with bending at the hip.  He is taking ibuprofen, tyelnol, tramadol and lidoderm patch for pain.  He has not had PT or JOANN.  He uses a cane for ambulatory assistance.    Oswestry score: 46%.    PHQ:  18.    On exam, he has an antalgic gait.  He has full 5/5 strength in the bilateral upper/ lower extremiteis.  1+ muscle stretch reflexes throughout and no sensory deficits.  Straight leg raise testing is negative bipaterally.  He has no daija with internal/ external rotation of the hips.  He has mild tenderness to palpation with pressure over the right hip bursa. Straight leg raise testing is negative bilaterally.      I have reveiwed an [facility] bialteral hips [facility] revealing:  Lumbar degenerative chgnes and disk height loss at multiple levels.  There is possible milde left sacroilitis.     He had an ultrasound of the right groin this morning ruling out inguinal hernia as a cause of his pain.    Assessment:  66 year old male with known lumbar spondylosis/ degenerative changes throughout the lumbar spine.  Acute right hip/ groin pain without focal neurologocial deficits  He is tender to palpation over the left hip.  We will further evaluated the lumbar spine with focused lumbar xrays.  i have referred him to ortho for hip evlaution.  We will determine further plan of care after orthopedics appointment and lumbar xrays.    See addendum below for xray results.      Visit Diagnosis:  Pain of right hip joint  -     X-Ray Lumbar Complete With Flex And Ext; Future; Expected date: 1/12/17  -     Ambulatory Referral to Orthopedics    Spondylosis of lumbar region without myelopathy or radiculopathy  -     X-Ray Lumbar Complete With Flex And Ext; Future; Expected date: 1/12/17  -     Ambulatory Referral to Orthopedics        Total time spent counseling greater than fifty percent of total visit time.  Counseling included discussion regarding imaging findings, diagnosis possibilities, treatment options, risks and benefits.   The patient had many questions regarding the options and long-term effects.     Addendum 1-20-17:  He had an appt with Dr. Guerrero 1-19-17.  Received a hip injection for bursitis.  On the phone today he states the injection did help him some but not completely.  i have reviewed xrays of the lumbar spine from [facility] dated 1-12-17:  Degenerative changes and disk height loss from L2 through S1 levels.    I called him 1-20-17 @ 1:50pm.  The him injection did help him some.  He does have lower back arthritis.  We discussed options of considering the intra articular injection offered by Dr. Pacheco or refaeral to pain management or physical therapy to  help with pain.  He would like time to think about it and will call back when he decides.

## 2017-01-12 NOTE — TELEPHONE ENCOUNTER
----- Message from Bertram Carrillo sent at 1/12/2017  9:59 AM CST -----  Contact: Be[facility] with [facility]   Belem with [facility] called regarding status on prior authorization for Rx ( lidocaine patches) please call back to confirm [phone]. Thanks,

## 2017-01-12 NOTE — LETTER
January 12, 2017      Adelaida Mckoy MD  [address]  [facility] [address]           [facility]  [address] [place] [address]  Phone: [phone]  Fax: [phone]          Patient: Myron Noel   MR Number: [id number]   YOB: 1950   Date of Visit: 1/12/2017       Dear Dr. Adelaida Mckoy:    Thank you for referring Myron Noel to me for evaluation. Attached you will find relevant portions of my assessment and plan of care.    If you have questions, please do not hesitate to call me. I look forward to following Myron Noel along with you.    Sincerely,    Jeri Carmona PA-C    Enclosure  CC:  No Recipients    If you would like to receive this communication electronically, please contact [email or web address] or [phone] to request more information on [employer] access.    For providers and/or their staff who would like to refer a patient to [facility], please contact us through our one-stop-shop provider referral line, [facility], at [phone].    If you feel you have received this communication in error or would no longer like to receive these types of communications, please e-mail [email or web address]

## 2017-01-12 NOTE — MR AVS SNAPSHOT
[facility]  [address] [place] [address]  Phone: [phone]  Fax: [phone]                  Myron Noel   2017 11:00 AM   Office Visit    Description:  Male : 1950   Provider:  Jeri Carmona PA-C   Department:  [facility]           Reason for Visit     Hip Pain           Diagnoses this Visit        Comments    Pain of right hip joint    -  Primary     Spondylosis of lumbar region without myelopathy or radiculopathy                To Do List           Future Appointments        Provider Department Dept Phone    2017 12:00 PM [facility] LE[facility]YNUS [facility] [phone]    2017 2:00 PM Terrell Pacheco MD [facility] [phone]    2017 3:00 PM Adelaida Mckoy MD [facility] [phone]      Goals (5 Years of Data)     None      [facility]     [facility] On [facility] Nurse Care Line -  Assistance  Registered nurses in the [facility] provide clinical advisement, health education, appointment booking, and other advisory services.  Call for this free service at [phone].             Medications           Message regarding Medications     Verify the changes and/or additions to your medication regime listed below are the same as discussed with your clinician today.  If any of these changes or additions are incorrect, please notify your healthcare provider.             Verify that the below list of medications is an accurate representation of the medications you are currently taking.  If none reported, the list may be blank. If incorrect, please contact your healthcare provider. Carry this list with you in case of emergency.           Current Medications     acetaminophen (TYLENOL EXTRA STRENGTH) 500 MG tablet Take 500 mg by mouth every 6 (six) hours as needed for Pain.    allopurinol (ZYLOPRIM) 300 MG tablet TAKE 1 TABET BY MOUTH 2 TIMES A  "DAY    aspirin 81 mg Tab Take by mouth.    atorvastatin (LIPITOR) 20 MG tablet Take 1 tablet (20 mg total) by mouth once daily.    blood sugar diagnostic (TRUETEST TEST STRIPS) Strp 1 strip by Misc.(Non-Drug; Combo Route) route once daily.    blood-glucose meter (TRUERESULT BLOOD GLUCOSE SYSTM) kit Use as instructed    fenofibrate 160 MG Tab Take 1 tablet (160 mg total) by mouth once daily.    fluticasone (FLONASE) 50 mcg/actuation nasal spray 1 spray by Each Nare route once daily.    furosemide (LASIX) 20 MG tablet Take 20 mg by mouth 2 (two) times daily.    glipiZIDE (GLUCOTROL) 10 MG tablet Take 1 tablet (10 mg total) by mouth 2 (two) times daily before meals.    lancets 28 gauge Misc 1 lancet by Misc.(Non-Drug; Combo Route) route once daily.    lidocaine (LIDODERM) 5 % Place 1 patch onto the skin once daily. Remove & Discard patch within 12 hours or as directed by MD    lisinopril (PRINIVIL,ZESTRIL) 20 MG tablet TAKE 1 TABLET DAILY    SITagliptan (JANUVIA) 100 MG Tab Take 1 tablet (100 mg total) by mouth once daily.    tramadol (ULTRAM) 50 mg tablet Take 1 tablet (50 mg total) by mouth every 6 (six) hours as needed for Pain.    VITAMIN D2 50,000 unit capsule Take 50,000 Units by mouth every 7 days.           Clinical Reference Information           Vital Signs - Last Recorded  Most recent update: 1/12/2017 10:56 AM by Judy Weldon LPN    BP Pulse Ht Wt BMI    130/76 (BP Location: Left arm, Patient Position: Sitting, BP Method: Automatic) 85 5' 9" (1.753 m) 133.9 kg (295 lb 3.1 oz) 43.59 kg/m2      Blood Pressure          Most Recent Value    BP  130/76      Allergies as of 1/12/2017     Venom-honey Bee      Immunizations Administered on Date of Encounter - 1/12/2017     None      Orders Placed During Today's Visit      Normal Orders This Visit    Ambulatory Referral to Orthopedics     Future Labs/Procedures Expected by Expires    X-Ray Lumbar Complete With Flex And Ext  1/12/2017 1/12/2018      "

## 2017-01-19 ENCOUNTER — OFFICE VISIT (OUTPATIENT)
Dept: ORTHOPEDICS | Facility: CLINIC | Age: 67
End: 2017-01-19
Payer: MEDICARE

## 2017-01-19 VITALS
SYSTOLIC BLOOD PRESSURE: 149 MMHG | WEIGHT: 295 LBS | DIASTOLIC BLOOD PRESSURE: 65 MMHG | BODY MASS INDEX: 43.69 KG/M2 | HEIGHT: 69 IN | HEART RATE: 79 BPM

## 2017-01-19 DIAGNOSIS — M70.61 TROCHANTERIC BURSITIS, RIGHT HIP: Primary | ICD-10-CM

## 2017-01-19 PROCEDURE — 1125F AMNT PAIN NOTED PAIN PRSNT: CPT | Mod: S$GLB,,, | Performed by: ORTHOPAEDIC SURGERY

## 2017-01-19 PROCEDURE — 99214 OFFICE O/P EST MOD 30 MIN: CPT | Mod: 25,S$GLB,, | Performed by: ORTHOPAEDIC SURGERY

## 2017-01-19 PROCEDURE — 1157F ADVNC CARE PLAN IN RCRD: CPT | Mod: S$GLB,,, | Performed by: ORTHOPAEDIC SURGERY

## 2017-01-19 PROCEDURE — 99999 PR PBB SHADOW E&M-EST. PATIENT-LVL III: CPT | Mod: PBBFAC,,, | Performed by: ORTHOPAEDIC SURGERY

## 2017-01-19 PROCEDURE — 1160F RVW MEDS BY RX/DR IN RCRD: CPT | Mod: S$GLB,,, | Performed by: ORTHOPAEDIC SURGERY

## 2017-01-19 PROCEDURE — 3077F SYST BP >= 140 MM HG: CPT | Mod: S$GLB,,, | Performed by: ORTHOPAEDIC SURGERY

## 2017-01-19 PROCEDURE — 3078F DIAST BP <80 MM HG: CPT | Mod: S$GLB,,, | Performed by: ORTHOPAEDIC SURGERY

## 2017-01-19 PROCEDURE — 1159F MED LIST DOCD IN RCRD: CPT | Mod: S$GLB,,, | Performed by: ORTHOPAEDIC SURGERY

## 2017-01-19 PROCEDURE — 20610 DRAIN/INJ JOINT/BURSA W/O US: CPT | Mod: RT,S$GLB,, | Performed by: ORTHOPAEDIC SURGERY

## 2017-01-19 RX ORDER — TRIAMCINOLONE ACETONIDE 40 MG/ML
40 INJECTION, SUSPENSION INTRA-ARTICULAR; INTRAMUSCULAR
Status: DISCONTINUED | OUTPATIENT
Start: 2017-01-19 | End: 2017-01-19 | Stop reason: HOSPADM

## 2017-01-19 RX ADMIN — TRIAMCINOLONE ACETONIDE 40 MG: 40 INJECTION, SUSPENSION INTRA-ARTICULAR; INTRAMUSCULAR at 02:01

## 2017-01-19 NOTE — PROCEDURES
Large Joint Aspiration/Injection  Date/Time: 1/19/2017 2:01 PM  Performed by: VERONICA DELACRUZ  Authorized by: VERONICA DELACRUZ     Consent Done?:  Yes (Verbal)  Indications:  Pain  Procedure site marked: Yes    Timeout: Prior to procedure the correct patient, procedure, and site was verified      Location:  Hip  Site:  R greater trochanteric bursa  Prep: Patient was prepped and draped in usual sterile fashion    Ultrasonic Guidance for needle placement: No  Needle size:  20 G  Approach:  Lateral  Medications:  40 mg triamcinolone acetonide 40 mg/mL  Patient tolerance:  Patient tolerated the procedure well with no immediate complications

## 2017-01-19 NOTE — LETTER
January 19, 2017      Pravin Reynoso MD  [address] [facility] [address]  [address]  [address] [address]  Phone: [phone]          Patient: Myron Noel   MR Number: [id number]   YOB: 1950   Date of Visit: 1/19/2017       Dear Dr. Pravin Reynoso:    Thank you for referring Myron Noel to me for evaluation. Attached you will find relevant portions of my assessment and plan of care.    If you have questions, please do not hesitate to call me. I look forward to following Myron Noel along with you.    Sincerely,    Terrell Pacheco MD    Enclosure  CC:  No Recipients    If you would like to receive this communication electronically, please contact [email or web address] or [phone] to request more information on [employer] Link access.    For providers and/or their staff who would like to refer a patient to [facility], please contact us through our one-stop-shop provider referral line, [facility], at [phone].    If you feel you have received this communication in error or would no longer like to receive these types of communications, please e-mail [email or web address]

## 2017-01-19 NOTE — PROGRESS NOTES
Past Medical History   Diagnosis Date    Cataract      ou done//    Chronic kidney disease 2001     nephrectomy for obstructive stones    Corneal abrasion, left      With leaf 40 yrs ago    Diabetes mellitus     Gout, chronic     Hypertension     Knee osteoarthritis 12/20/2013       Past Surgical History   Procedure Laterality Date    Appendectomy      Tonsillectomy      Nephrectomy      Eye surgery      Cataract extraction  08/06/2014     od    Cataract extraction w/  intraocular lens implant Left 11/5/14       Current Outpatient Prescriptions   Medication Sig    acetaminophen (TYLENOL EXTRA STRENGTH) 500 MG tablet Take 500 mg by mouth every 6 (six) hours as needed for Pain.    allopurinol (ZYLOPRIM) 300 MG tablet TAKE 1 TABET BY MOUTH 2 TIMES A DAY    aspirin 81 mg Tab Take by mouth.    atorvastatin (LIPITOR) 20 MG tablet Take 1 tablet (20 mg total) by mouth once daily.    blood sugar diagnostic (TRUETEST TEST STRIPS) Strp 1 strip by Misc.(Non-Drug; Combo Route) route once daily.    blood-glucose meter (TRUERESULT BLOOD GLUCOSE SYSTM) kit Use as instructed    fenofibrate 160 MG Tab Take 1 tablet (160 mg total) by mouth once daily.    fluticasone (FLONASE) 50 mcg/actuation nasal spray 1 spray by Each Nare route once daily.    furosemide (LASIX) 20 MG tablet Take 20 mg by mouth 2 (two) times daily.    glipiZIDE (GLUCOTROL) 10 MG tablet Take 1 tablet (10 mg total) by mouth 2 (two) times daily before meals.    lancets 28 gauge Misc 1 lancet by Misc.(Non-Drug; Combo Route) route once daily.    lidocaine (LIDODERM) 5 % Place 1 patch onto the skin once daily. Remove & Discard patch within 12 hours or as directed by MD    lisinopril (PRINIVIL,ZESTRIL) 20 MG tablet TAKE 1 TABLET DAILY    SITagliptan (JANUVIA) 100 MG Tab Take 1 tablet (100 mg total) by mouth once daily.    VITAMIN D2 50,000 unit capsule Take 50,000 Units by mouth every 7 days.     No current facility-administered medications for  this visit.        Review of patient's allergies indicates:   Allergen Reactions    Venom-honey bee Other (See Comments)     Passed out       Family History   Problem Relation Age of Onset    Heart defect Father     Alzheimer's disease Maternal Aunt     Diabetes Maternal Grandmother     Melanoma Neg Hx     Psoriasis Neg Hx     Lupus Neg Hx     Eczema Neg Hx        Social History     Social History    Marital status: Single     Spouse name: N/A    Number of children: N/A    Years of education: N/A     Occupational History    Not on file.     Social History Main Topics    Smoking status: Former Smoker     Types: Pipe    Smokeless tobacco: Never Used    Alcohol use No    Drug use: No    Sexual activity: Yes     Partners: Female     Other Topics Concern    Not on file     Social History Narrative       Chief Complaint:   Chief Complaint   Patient presents with    Right Hip - Pain       History of present illness: Is a 66-year-old male seen for new problem in consultation for Jeri Carmona.  Patient has a history of back problems but is been complaining of right lateral hip pain and occasional groin pain.  Patient has trouble getting in and out of a chair.  Pain laying on the side.  Trouble getting in and out of cars.  Pain is been present now for several years but slowly getting worse.  He rates his pain as a 4 out of 10.      Review of Systems:    Constitution: Negative for chills, fever, and sweats.  Negative for unexplained weight loss.    HENT:  Negative for headaches and blurry vision.    Cardiovascular:Negative for chest pain or irregular heart beat. Negative for hypertension.    Respiratory:  Negative for cough and shortness of breath.    Gastrointestinal: Negative for abdominal pain, heartburn, melena, nausea, and vomitting.    Genitourinary:  Negative bladder incontinence and dysuria.    Musculoskeletal:  See HPI    Neurological: Negative for numbness.    Psychiatric/Behavioral: Negative for  depression.  The patient is not nervous/anxious.      Endocrine: Negative for polyuria    Hematologic/Lymphatic: Negative for bleeding problem.  Does not bruise/bleed easily.    Skin: Negative for poor would healing and rash      Physical Examination:    Vital Signs:  There were no vitals filed for this visit.    Body mass index is 43.56 kg/(m^2).    This a well-developed, well nourished patient in no acute distress.  They are alert and oriented and cooperative to examination.  Pt. walks without an antalgic gait.      Examination of the patient's right hip shows full range of motion with flexion to 160°, extension to 0, external rotation to 50°, internal rotation of 15°, abduction of 50°, adduction of 15°. Skin has no rashes or bruising. Patient has negative Stinchfield exam. Patient has negative straight leg raise.Negative internal impingement test. Negative KEV test. Negative Keesha's test. Patient has no pain with hip range of motion.  Moderately tender to palpation over the greater trochanteric bursa. Patient is 5 out of 5 motor strength, palpable distal pulses, and intact light touch sensation.     Examination of the patient's left hip shows full range of motion with flexion to 160°, extension to 0, external rotation to 50°, internal rotation of 15°, abduction of 50°, adduction of 15°. Skin has no rashes or bruising. Patient has negative Stinchfield exam. Patient has negative straight leg raise.Negative internal impingement test. Negative KEV test. Negative Keesha's test. Patient has no pain with hip range of motion. Nontender to palpation over the greater trochanteric bursa. Patient is 5 out of 5 motor strength, palpable distal pulses, and intact light touch sensation.     X-rays: X-rays of the pelvis and right hip are available for review which show some mild signs of femoral acetabular impingement and mild hip arthritis     Assessment:: Right trochanteric bursitis    Plan:  I reviewed the findings with him  today.  I recommended trying an injection in the lateral aspect of his hip since as were the primary aspect of his pain is.  If he continues to have significant pain despite the injection, the patient might benefit from intra-articular injection.    This note was created using Dragon voice recognition software that occasionally misinterpreted phrases or words.    Consult note is delivered via Epic messaging service.

## 2017-02-09 ENCOUNTER — TELEPHONE (OUTPATIENT)
Dept: FAMILY MEDICINE | Facility: CLINIC | Age: 67
End: 2017-02-09

## 2017-02-09 RX ORDER — ATORVASTATIN CALCIUM 40 MG/1
40 TABLET, FILM COATED ORAL DAILY
Qty: 90 TABLET | Refills: 3 | Status: SHIPPED | OUTPATIENT
Start: 2017-02-09 | End: 2017-05-23 | Stop reason: SDUPTHER

## 2017-02-10 NOTE — TELEPHONE ENCOUNTER
Diabetes is improving 7.1%, but not yet at goal which is less than 7% Renal funciton is low but at baseline.  Needs to continue with glipizide and januvia and really try to eat low carb diet, work on weight loss and we will recheck this in 3 months. If still not at goal at that time may need to add a third medication.    Cholesterol is not at goal, lipitor increased to 40 mg daily and needs to follow up in 3 months.    Ultrasound was negative for evidence of a hernia, there are some lymph nodes but they are not enlarged.

## 2017-02-22 ENCOUNTER — LAB VISIT (OUTPATIENT)
Dept: LAB | Facility: HOSPITAL | Age: 67
End: 2017-02-22
Attending: INTERNAL MEDICINE
Payer: MEDICARE

## 2017-02-22 DIAGNOSIS — E78.5 HYPERLIPEMIA: ICD-10-CM

## 2017-02-22 DIAGNOSIS — N28.9 DECREASED RENAL FUNCTION: ICD-10-CM

## 2017-02-22 DIAGNOSIS — N25.81 SECONDARY HYPERPARATHYROIDISM (OF RENAL ORIGIN): ICD-10-CM

## 2017-02-22 DIAGNOSIS — R94.4 ABNORMAL RENAL FUNCTION TEST: ICD-10-CM

## 2017-02-22 DIAGNOSIS — E87.20 ACIDOSIS: ICD-10-CM

## 2017-02-22 DIAGNOSIS — Z90.5 ABSENT KIDNEY, ACQUIRED: ICD-10-CM

## 2017-02-22 DIAGNOSIS — R60.9 EDEMA: ICD-10-CM

## 2017-02-22 DIAGNOSIS — R80.9 PROTEINURIA: ICD-10-CM

## 2017-02-22 DIAGNOSIS — N18.30 CHRONIC KIDNEY DISEASE, STAGE III (MODERATE): ICD-10-CM

## 2017-02-22 DIAGNOSIS — I10 HYPERTENSION, ESSENTIAL: ICD-10-CM

## 2017-02-22 LAB
25(OH)D3+25(OH)D2 SERPL-MCNC: 24 NG/ML
ALBUMIN SERPL BCP-MCNC: 3.7 G/DL
ANION GAP SERPL CALC-SCNC: 7 MMOL/L
ANION GAP SERPL CALC-SCNC: 7 MMOL/L
BASOPHILS # BLD AUTO: 0.02 K/UL
BASOPHILS NFR BLD: 0.3 %
BUN SERPL-MCNC: 32 MG/DL
BUN SERPL-MCNC: 32 MG/DL
CALCIUM SERPL-MCNC: 9.8 MG/DL
CALCIUM SERPL-MCNC: 9.8 MG/DL
CHLORIDE SERPL-SCNC: 110 MMOL/L
CHLORIDE SERPL-SCNC: 110 MMOL/L
CO2 SERPL-SCNC: 27 MMOL/L
CO2 SERPL-SCNC: 27 MMOL/L
CREAT SERPL-MCNC: 1.3 MG/DL
CREAT SERPL-MCNC: 1.3 MG/DL
DIFFERENTIAL METHOD: ABNORMAL
EOSINOPHIL # BLD AUTO: 0.3 K/UL
EOSINOPHIL NFR BLD: 4.5 %
ERYTHROCYTE [DISTWIDTH] IN BLOOD BY AUTOMATED COUNT: 14.7 %
EST. GFR  (AFRICAN AMERICAN): >60 ML/MIN/1.73 M^2
EST. GFR  (AFRICAN AMERICAN): >60 ML/MIN/1.73 M^2
EST. GFR  (NON AFRICAN AMERICAN): 56.1 ML/MIN/1.73 M^2
EST. GFR  (NON AFRICAN AMERICAN): 56.1 ML/MIN/1.73 M^2
GLUCOSE SERPL-MCNC: 113 MG/DL
GLUCOSE SERPL-MCNC: 113 MG/DL
HCT VFR BLD AUTO: 41 %
HGB BLD-MCNC: 13.1 G/DL
LYMPHOCYTES # BLD AUTO: 2.2 K/UL
LYMPHOCYTES NFR BLD: 30.3 %
MCH RBC QN AUTO: 31.8 PG
MCHC RBC AUTO-ENTMCNC: 32 %
MCV RBC AUTO: 100 FL
MONOCYTES # BLD AUTO: 0.6 K/UL
MONOCYTES NFR BLD: 8.3 %
NEUTROPHILS # BLD AUTO: 4 K/UL
NEUTROPHILS NFR BLD: 56.3 %
PHOSPHATE SERPL-MCNC: 2.7 MG/DL
PHOSPHATE SERPL-MCNC: 2.7 MG/DL
PLATELET # BLD AUTO: 223 K/UL
PMV BLD AUTO: 11.4 FL
POTASSIUM SERPL-SCNC: 4.2 MMOL/L
POTASSIUM SERPL-SCNC: 4.2 MMOL/L
PTH-INTACT SERPL-MCNC: 25 PG/ML
RBC # BLD AUTO: 4.12 M/UL
SODIUM SERPL-SCNC: 144 MMOL/L
SODIUM SERPL-SCNC: 144 MMOL/L
WBC # BLD AUTO: 7.12 K/UL

## 2017-02-22 PROCEDURE — 80069 RENAL FUNCTION PANEL: CPT

## 2017-02-22 PROCEDURE — 85025 COMPLETE CBC W/AUTO DIFF WBC: CPT

## 2017-02-22 PROCEDURE — 36415 COLL VENOUS BLD VENIPUNCTURE: CPT | Mod: PO

## 2017-02-22 PROCEDURE — 82306 VITAMIN D 25 HYDROXY: CPT

## 2017-02-22 PROCEDURE — 83970 ASSAY OF PARATHORMONE: CPT

## 2017-03-28 ENCOUNTER — TELEPHONE (OUTPATIENT)
Dept: FAMILY MEDICINE | Facility: CLINIC | Age: 67
End: 2017-03-28

## 2017-04-05 RX ORDER — FENOFIBRATE 160 MG/1
TABLET ORAL
Qty: 90 TABLET | Refills: 0 | Status: SHIPPED | OUTPATIENT
Start: 2017-04-05 | End: 2017-07-05 | Stop reason: SDUPTHER

## 2017-05-08 ENCOUNTER — DOCUMENTATION ONLY (OUTPATIENT)
Dept: FAMILY MEDICINE | Facility: CLINIC | Age: 67
End: 2017-05-08

## 2017-05-08 NOTE — PROGRESS NOTES
Pre-Visit Chart Review  For Appointment Scheduled on 5/12/17.    Health Maintenance Due   Topic Date Due    Eye Exam  10/20/2016    Foot Exam  04/01/2017    Hemoglobin A1c  04/12/2017

## 2017-05-10 DIAGNOSIS — E11.9 TYPE 2 DIABETES, HBA1C GOAL < 7%: Primary | ICD-10-CM

## 2017-05-10 RX ORDER — ATORVASTATIN CALCIUM 20 MG/1
TABLET, FILM COATED ORAL
Qty: 90 TABLET | Refills: 3 | OUTPATIENT
Start: 2017-05-10

## 2017-05-12 ENCOUNTER — LAB VISIT (OUTPATIENT)
Dept: LAB | Facility: HOSPITAL | Age: 67
End: 2017-05-12
Attending: FAMILY MEDICINE
Payer: MEDICARE

## 2017-05-12 ENCOUNTER — OFFICE VISIT (OUTPATIENT)
Dept: FAMILY MEDICINE | Facility: CLINIC | Age: 67
End: 2017-05-12
Payer: MEDICARE

## 2017-05-12 VITALS
HEART RATE: 74 BPM | WEIGHT: 292.31 LBS | HEIGHT: 69 IN | BODY MASS INDEX: 43.29 KG/M2 | DIASTOLIC BLOOD PRESSURE: 69 MMHG | SYSTOLIC BLOOD PRESSURE: 127 MMHG | TEMPERATURE: 98 F

## 2017-05-12 DIAGNOSIS — E78.5 HYPERLIPIDEMIA ASSOCIATED WITH TYPE 2 DIABETES MELLITUS: ICD-10-CM

## 2017-05-12 DIAGNOSIS — E11.69 HYPERLIPIDEMIA ASSOCIATED WITH TYPE 2 DIABETES MELLITUS: ICD-10-CM

## 2017-05-12 DIAGNOSIS — I15.2 HYPERTENSION ASSOCIATED WITH DIABETES: ICD-10-CM

## 2017-05-12 DIAGNOSIS — E11.9 ENCOUNTER FOR DIABETIC FOOT EXAM: ICD-10-CM

## 2017-05-12 DIAGNOSIS — E11.59 HYPERTENSION ASSOCIATED WITH DIABETES: ICD-10-CM

## 2017-05-12 DIAGNOSIS — E66.01 OBESITY, CLASS III, BMI 40-49.9 (MORBID OBESITY): ICD-10-CM

## 2017-05-12 DIAGNOSIS — N18.30 CKD (CHRONIC KIDNEY DISEASE) STAGE 3, GFR 30-59 ML/MIN: ICD-10-CM

## 2017-05-12 DIAGNOSIS — D86.2 SARCOIDOSIS OF LUNG WITH SARCOIDOSIS OF LYMPH NODES: ICD-10-CM

## 2017-05-12 LAB
ALBUMIN SERPL BCP-MCNC: 4.3 G/DL
ALP SERPL-CCNC: 73 U/L
ALT SERPL W/O P-5'-P-CCNC: 26 U/L
ANION GAP SERPL CALC-SCNC: 12 MMOL/L
AST SERPL-CCNC: 26 U/L
BILIRUB SERPL-MCNC: 0.5 MG/DL
BUN SERPL-MCNC: 40 MG/DL
CALCIUM SERPL-MCNC: 10.3 MG/DL
CHLORIDE SERPL-SCNC: 105 MMOL/L
CHOLEST/HDLC SERPL: 6.6 {RATIO}
CO2 SERPL-SCNC: 25 MMOL/L
CREAT SERPL-MCNC: 1.5 MG/DL
EST. GFR  (AFRICAN AMERICAN): 54.1 ML/MIN/1.73 M^2
EST. GFR  (NON AFRICAN AMERICAN): 46.8 ML/MIN/1.73 M^2
GLUCOSE SERPL-MCNC: 90 MG/DL
HDL/CHOLESTEROL RATIO: 15.3 %
HDLC SERPL-MCNC: 190 MG/DL
HDLC SERPL-MCNC: 29 MG/DL
LDLC SERPL CALC-MCNC: 98.2 MG/DL
NONHDLC SERPL-MCNC: 161 MG/DL
POTASSIUM SERPL-SCNC: 4.1 MMOL/L
PROT SERPL-MCNC: 7.7 G/DL
SODIUM SERPL-SCNC: 142 MMOL/L
TRIGL SERPL-MCNC: 314 MG/DL

## 2017-05-12 PROCEDURE — 1125F AMNT PAIN NOTED PAIN PRSNT: CPT | Mod: S$GLB,,, | Performed by: FAMILY MEDICINE

## 2017-05-12 PROCEDURE — 3045F PR MOST RECENT HEMOGLOBIN A1C LEVEL 7.0-9.0%: CPT | Mod: S$GLB,,, | Performed by: FAMILY MEDICINE

## 2017-05-12 PROCEDURE — 99214 OFFICE O/P EST MOD 30 MIN: CPT | Mod: S$GLB,,, | Performed by: FAMILY MEDICINE

## 2017-05-12 PROCEDURE — 3078F DIAST BP <80 MM HG: CPT | Mod: S$GLB,,, | Performed by: FAMILY MEDICINE

## 2017-05-12 PROCEDURE — 99499 UNLISTED E&M SERVICE: CPT | Mod: S$GLB,,, | Performed by: FAMILY MEDICINE

## 2017-05-12 PROCEDURE — 3074F SYST BP LT 130 MM HG: CPT | Mod: S$GLB,,, | Performed by: FAMILY MEDICINE

## 2017-05-12 PROCEDURE — 99999 PR PBB SHADOW E&M-EST. PATIENT-LVL III: CPT | Mod: PBBFAC,,, | Performed by: FAMILY MEDICINE

## 2017-05-12 PROCEDURE — 1160F RVW MEDS BY RX/DR IN RCRD: CPT | Mod: S$GLB,,, | Performed by: FAMILY MEDICINE

## 2017-05-12 PROCEDURE — 36415 COLL VENOUS BLD VENIPUNCTURE: CPT | Mod: PO

## 2017-05-12 PROCEDURE — 1159F MED LIST DOCD IN RCRD: CPT | Mod: S$GLB,,, | Performed by: FAMILY MEDICINE

## 2017-05-12 PROCEDURE — 83036 HEMOGLOBIN GLYCOSYLATED A1C: CPT

## 2017-05-12 PROCEDURE — 4010F ACE/ARB THERAPY RXD/TAKEN: CPT | Mod: S$GLB,,, | Performed by: FAMILY MEDICINE

## 2017-05-12 PROCEDURE — 80053 COMPREHEN METABOLIC PANEL: CPT

## 2017-05-12 PROCEDURE — 80061 LIPID PANEL: CPT

## 2017-05-12 RX ORDER — ASCORBIC ACID 125 MG
1 TABLET,CHEWABLE ORAL DAILY
Status: ON HOLD | COMMUNITY
End: 2018-08-15 | Stop reason: HOSPADM

## 2017-05-12 NOTE — PROGRESS NOTES
CHIEF COMPLAINT: follow up      HISTORY OF PRESENT ILLNESS:  Myron Noel is a 67 y.o. male patient who presents to clinic for follow up on his chronic medical conditions.     1. Type 2 DM: recent HgA1c was 7.1%. He is on januvia, glucotrol. He is on an ACE-I, statin, ASA. He had evidence of microalbuminuria and is established with Dr. De Anda. He is up to date on his immunizations. He follows up with an ophthalmologist.  He is due for diabetic foot exam.     2. Hyperlipidemia: he is on lipitor, fenofibrate, ASA. He denies any dark colored urine, myalgia. He is due for labs.    3. HTN: patient is on lisinopril 40 mg daily and lasix. He denies any CP, cough, SOB.       REVIEW OF SYSTEMS:  The patient denies any fever, chills, night sweats, headaches, vision changes, difficulty speaking or swallowing, decreased hearing, weight loss, weight gain, chest pain, palpitations, shortness of breath, cough, nausea, vomiting, abdominal pain, dysuria, diarrhea, constipation, hematuria, hematochezia, melena, changes in his hair, skin,, numbness or weakness in his extremities, over any of his joints, myalgia, swollen glands, easy bruising, fatigue, edema.       MEDICATIONS:   Reviewed and/or reconciled in EPIC    ALLERGIES:  Reviewed and/or reconciled in [facility]    PAST MEDICAL/SURGICAL HISTORY:   Past Medical History:   Diagnosis Date    Cataract     ou done//    Chronic kidney disease 2001    nephrectomy for obstructive stones    Corneal abrasion, left     With leaf 40 yrs ago    Diabetes mellitus     Gout, chronic     Hypertension     Knee osteoarthritis 12/20/2013      Past Surgical History:   Procedure Laterality Date    APPENDECTOMY      CATARACT EXTRACTION  08/06/2014    od    CATARACT EXTRACTION W/  INTRAOCULAR LENS IMPLANT Left 11/5/14    EYE SURGERY      NEPHRECTOMY      TONSILLECTOMY         FAMILY HISTORY:    Family History   Problem Relation Age of Onset    Heart defect Father     Alzheimer's disease Maternal  Aunt     Diabetes Maternal Grandmother     Melanoma Neg Hx     Psoriasis Neg Hx     Lupus Neg Hx     Eczema Neg Hx        SOCIAL HISTORY:    Social History     Social History    Marital status: Single     Spouse name: N/A    Number of children: N/A    Years of education: N/A     Occupational History    Not on file.     Social History Main Topics    Smoking status: Former Smoker     Types: Pipe    Smokeless tobacco: Never Used    Alcohol use No    Drug use: No    Sexual activity: Yes     Partners: Female     Other Topics Concern    Not on file     Social History Narrative       PHYSICAL EXAM:  VITAL SIGNS:   There were no vitals filed for this visit.  GENERAL:  Patient appears well nourished, sitting on exam table, in no acute distress.  HEENT:  Atraumatic, normocephalic, PERRLA, EOMI, no conjunctival injection, sclerae are anicteric, normal external auditory canals,TMs clear b/l, gross hearing intact to whisper, MMM, no oropharygneal erythema or exudate.  NECK:  Supple, normal ROM, trachea is midline , no supraclavicular or cervical LAD or masses palpated.   CARDIOVASCULAR:  RRR, normal S1 and S2, no m/r/g.  RESPIRATORY:  CTA b/l, no wheezes, rhonchi, rales.  No increased work of breathing, no  use of accessory muscles.  ABDOMEN:  Soft, nontender, nondistended, normoactive bowel sounds in all four quadrants, no rebound or guarding, no HSM or masses palpated.  Normal percussion.  EXTREMITIES:  2+ DP pulses b/l, no edema.  SKIN:  Warm, no lesions on exposed skin.  NEUROMUSCULAR:  Cranial nerves II-XII grossly intact. There is redness, swelling, erythema over dorsal surface of left foot. No clubbing or cyanosis of digits/nails, there is onychomycosis of finger and toenails  Steady gait.  PSYCH:  Patient is alert and oriented to person, time, place. They are appropriately dressed and groomed. There is normal eye contact. Rate and tone of speech is normal. Normal insight, judgement. Normal thought content  and process.       LABORATORY/IMAGING STUDIES: pending    ASSESSMENT/PLAN: This is a 67 y.o. male who presents to clinic for evaluation of the following concerns  1. Type 2 DM with renal manifestations, need for diabetic foot exam: see below  2. Hyperlipidemia: obtain CMP, lipid panel  3. HTN: see below  4. Obesity: see below  5. Sarcoidosis: follow up with pulmonology in November 6. CKD stage III: follow up with Dr. De Anda.    Patient readiness: acceptance and barriers:none    During the course of the visit the patient was educated and counseled about the following:     Diabetes:  HgA1c,lipid panel, refer to podiatry  Hypertension: continue with current medications.  Obesity:   General weight loss/lifestyle modification strategies discussed (elicit support from others; identify saboteurs; non-food rewards, etc).  Diet interventions: moderate (500 kCal/d) deficit diet.  Informal exercise measures discussed, e.g. taking stairs instead of elevator.  Regular aerobic exercise program discussed.    Goals: Diabetes: Maintain Hemoglobin A1C below 7, Hypertension: Reduce Blood Pressure and Obesity: Reduce calorie intake and BMI    Did patient meet goals/outcomes: No    The following self management tools provided: declined    Patient Instructions (the written plan) was given to the patient/family.     Time spent with patient: 30 minutes      FOLLOW UP: 6 months      Adelaida Mckoy MD

## 2017-05-12 NOTE — MR AVS SNAPSHOT
[facility]  [address] [place] [address]  Phone: [phone]  Fax: [phone]                  Myron Noel   2017 1:40 PM   Office Visit    Description:  Male : 1950   Provider:  Adelaida Mckoy MD   Department:  [facility]           Reason for Visit     Follow-up           Diagnoses this Visit        Comments    Uncontrolled type 2 diabetes mellitus with microalbuminuria, without long-term current use of insulin    -  Primary     Hyperlipidemia associated with type 2 diabetes mellitus         Hypertension associated with diabetes         CKD (chronic kidney disease) stage 3, GFR 30-59 ml/min         Sarcoidosis of lung with sarcoidosis of lymph nodes         Encounter for diabetic foot exam         Obesity, Class III, BMI 40-49.9 (morbid obesity)                To Do List           Future Appointments        Provider Department Dept Phone    2017 9:00 AM MERA ENDOCRINE EDUCATOR [facility] [phone]    2017 10:15 AM Nelson Sepulveda DPM [facility] Podiatry [phone]    2017 1:40 PM Adelaida Mckoy MD [facility] [phone]      Goals (5 Years of Data)     None      Follow-Up and Disposition     Return in about 6 months (around 2017).    Follow-up and Disposition History      [facility]     [facility] -  Assistance  Unless otherwise directed by your provider, please contact [facility], our nurse care line that is available for  assistance.     Registered nurses in the [facility] provide: appointment scheduling, clinical advisement, health education, and other advisory services.  Call: [phone] (toll free)               Medications           Message regarding Medications     Verify the changes and/or additions to your medication regime listed below are the same as discussed with your clinician today.  If any of these changes or additions  are incorrect, please notify your healthcare provider.        STOP taking these medications     VITAMIN D2 50,000 unit capsule Take 50,000 Units by mouth every 7 days.           Verify that the below list of medications is an accurate representation of the medications you are currently taking.  If none reported, the list may be blank. If incorrect, please contact your healthcare provider. Carry this list with you in case of emergency.           Current Medications     acetaminophen (TYLENOL EXTRA STRENGTH) 500 MG tablet Take 500 mg by mouth every 6 (six) hours as needed for Pain.    allopurinol (ZYLOPRIM) 300 MG tablet TAKE 1 TABET BY MOUTH 2 TIMES A DAY    aspirin 81 mg Tab Take by mouth.    atorvastatin (LIPITOR) 40 MG tablet Take 1 tablet (40 mg total) by mouth once daily.    blood sugar diagnostic (TRUETEST TEST STRIPS) Strp 1 strip by Misc.(Non-Drug; Combo Route) route once daily.    blood-glucose meter (TRUERESULT BLOOD GLUCOSE SYSTM) kit Use as instructed    cholecalciferol, vitamin D3, (VITAMIN D3) 1,000 unit Chew Take 1 tablet by mouth once daily.    fenofibrate 160 MG Tab TAKE 1 TABLET BY MOUTH DAILY    fluticasone (FLONASE) 50 mcg/actuation nasal spray 1 spray by Each Nare route once daily.    furosemide (LASIX) 20 MG tablet Take 20 mg by mouth 2 (two) times daily.    glipiZIDE (GLUCOTROL) 10 MG tablet Take 1 tablet (10 mg total) by mouth 2 (two) times daily before meals.    lancets 28 gauge Misc 1 lancet by Misc.(Non-Drug; Combo Route) route once daily.    lidocaine (LIDODERM) 5 % Place 1 patch onto the skin once daily. Remove & Discard patch within 12 hours or as directed by MD    lisinopril (PRINIVIL,ZESTRIL) 20 MG tablet TAKE 1 TABLET DAILY    SITagliptan (JANUVIA) 100 MG Tab Take 1 tablet (100 mg total) by mouth once daily.    UNABLE TO FIND Take 1 tablet by mouth once daily. medication name: Super Beta Prostate           Clinical Reference Information           Your Vitals Were     BP Pulse Temp  "Height Weight BMI    127/69 (BP Location: Right arm, Patient Position: Sitting, BP Method: Automatic) 74 98.1 °F (36.7 °C) (Oral) 5' 9" (1.753 m) 132.6 kg (292 lb 5.3 oz) 43.17 kg/m2      Blood Pressure          Most Recent Value    BP  127/69      Allergies as of 5/12/2017     Venom-honey Bee      Immunizations Administered on Date of Encounter - 5/12/2017     None      Orders Placed During Today's Visit      Normal Orders This Visit    Ambulatory consult to Diabetic Education     Ambulatory referral to Podiatry     Future Labs/Procedures Expected by Expires    Comprehensive metabolic panel  5/12/2017 7/11/2018    Hemoglobin A1c  5/12/2017 7/11/2018    Lipid panel  5/12/2017 7/11/2018      Instructions      Diabetes (General Information)  Diabetes is a long-term health problem. It means your body does not make enough insulin. Or it may mean that your body cannot use the insulin it makes. Insulin is a hormone in your body. It lets blood sugar (glucose) reach the cells in your body. All of your cells need glucose for fuel.  When you have diabetes, the glucose in your blood builds up because it cannot get into the cells. This buildup is called high blood sugar (hyperglycemia).  Your blood sugar level depends on several things. It depends on what kind of food you eat and how much of it you eat. It also depends on how much exercise you get, and how much insulin you have in your body. Eating too much of the wrong kinds of food or not taking diabetes medicine on time can cause high blood sugar. Infections can cause high blood sugar even if you are taking medicines correctly.  These things can also cause low blood sugar:  · Missing meals  · Not eating enough food  · Taking too much diabetes medicine  Diabetes can cause serious problems over time if you do not get treated. These problems include heart disease, stroke, kidney failure, and blindness. They also include nerve pain or loss of feeling in your legs and feet, and " gangrene of the feet. By keeping your blood sugar under control you can prevent or delay these problems.  Normal blood sugar levels are 80 to 100 before a meal and less than 180 in the 1 to 2 hours after a meal.  Home care  Follow these guidelines when caring for yourself at home:  · Follow the diet your healthcare provider gives you. Take insulin or other diabetes medicine exactly as told to.  · Watch your blood sugar as you are told to. Keep a log of your results. This will help your provider change your medicines to keep your blood sugar under control.  · Try to reach your ideal weight. You may be able to cut back on or not have to take diabetes medicine if you eat the right foods and get exercise.  · Do not smoke. Smoking worsens the effects of diabetes on your circulation. You are much more likely to have a heart attack if you have diabetes and you smoke.  · Take good care of your feet. If you have lost feeling in your feet, you may not see an injury or infection. Check your feet and between your toes at least once a week.  · Wear a medical alert bracelet or necklace, or carry a card in your wallet that says you have diabetes. This will help healthcare providers give you the right care if you get very ill and cannot tell them that you have diabetes.  Sick day plan  If you get a cold, the flu, or a bacterial or viral infection, take these steps:  · Look at your diabetes sick plan and call your healthcare provider as you were told to. You may need to call your provider right away if:  ¨ Your blood sugar is above 240 while taking your diabetes medicine  ¨ Your urine ketone levels are above normal or high  ¨ You have been vomiting more than 6 hours  ¨ You have trouble breathing or your breath ha s a fruity smell  ¨ You have a high fever  ¨ You have a fever for several days and you are not getting better  ¨ You get light-headed and are sleepier than usual  · Keep taking your diabetes pills (oral medicine) even if  you have been vomiting and are feeling sick. Call your provider right away because you may need insulin to lower your blood sugar until you recover from your illness.  · Keep taking your insulin even if you have been vomiting and are feeling sick. Call your provider right away to ask if you need to change your insulin dose. This will depend on your blood sugar results.  · Check your blood sugar every 2 to 4 hours, or at least 4 times a day.  · Check your ketones often. If you are vomiting and having diarrhea, watch them more often.  · Do not skip meals. Try to eat small meals on a regular schedule. Do this even if you do not feel like eating.  · Drink water or other liquids that do not have caffeine or calories. This will keep you from getting dehydrated. If you are nauseated or vomiting, takes small sips every 5 minutes. To prevent dehydration try to drink a cup (8 ounces) of fluids every hour while you are awake.  General care  Always bring a source of fast-acting sugar with you in case you have symptoms of low blood sugar (below 70). At the first sign of low blood sugar, eat or drink 15 to 20 grams of fast-acting sugar to raise your blood sugar. Examples are:  · 3 to 4 glucose tablets. You can buy these at most drugstores.  · 4 ounces (1/2 cup) of regular (not diet) soft drinks  · 4 ounces (1/2 cup) of any fruit juice  · 8 ounces (1 cup) of milk  · 5 to 6 pieces of hard candy  · 1 tablespoon of honey  Check your blood sugar 15 minutes after treating yourself. If it is still below 70, take 15 to 20 more grams of fast-acting sugar. Test again in 15 minutes. If it returns to normal (70 or above), eat a snack or meal to keep your blood sugar in a safe range. If it stays low, call your doctor or go to an emergency room.  Follow-up care  Follow-up with your healthcare provider, or as advised. For more information about diabetes, visit the American Diabetes Association website at www.diabetes.org or call  [phone].  When to seek medical advice  Call your healthcare provider right away if you have any of these symptoms of high blood sugar:  · Frequent urination  · Dizziness  · Drowsiness  · Thirst  · Headache  · Nausea or vomiting  · Abdominal pain  · Eyesight changes  · Fast breathing  · Confusion or loss of consciousness  Also call your provider right away if you have any of these signs of low blood sugar:  · Fatigue  · Headache  · Shakes  · Excess sweating  · Hunger  · Feeling anxious or restless  · Eyesight changes  · Drowsiness  · Weakness  · Confusion or loss of consciousness  Call 911  Call for emergency help right away if any of these occur:  · Chest pain or shortness of breath  · Dizziness or fainting  · Weakness of an arm or leg or one side of the face  · Trouble speaking or seeing   Date Last Reviewed: 6/1/2016 © 2000-2016 [employer]. [address] [place] [address]. All rights reserved. This information is not intended as a substitute for professional medical care. Always follow your healthcare professional's instructions.             Language Assistance Services     ATTENTION: Language assistance services are available, free of charge. Please call [phone].      ATENCIÓN: Si habla español, tiene a ibarra disposición servicios gratuitos de asistencia lingüística. Llame al [phone].     CHÚ Ý: N?u b?n nói Ti?ng Vi?t, có các d?ch v? h? tr? ngôn ng? mi?n phí dành cho b?n. G?i s? [phone].         [facility] complies with applicable Federal civil rights laws and does not discriminate on the basis of race, color, national origin, age, disability, or sex.

## 2017-05-12 NOTE — PATIENT INSTRUCTIONS
Diabetes (General Information)  Diabetes is a long-term health problem. It means your body does not make enough insulin. Or it may mean that your body cannot use the insulin it makes. Insulin is a hormone in your body. It lets blood sugar (glucose) reach the cells in your body. All of your cells need glucose for fuel.  When you have diabetes, the glucose in your blood builds up because it cannot get into the cells. This buildup is called high blood sugar (hyperglycemia).  Your blood sugar level depends on several things. It depends on what kind of food you eat and how much of it you eat. It also depends on how much exercise you get, and how much insulin you have in your body. Eating too much of the wrong kinds of food or not taking diabetes medicine on time can cause high blood sugar. Infections can cause high blood sugar even if you are taking medicines correctly.  These things can also cause low blood sugar:  · Missing meals  · Not eating enough food  · Taking too much diabetes medicine  Diabetes can cause serious problems over time if you do not get treated. These problems include heart disease, stroke, kidney failure, and blindness. They also include nerve pain or loss of feeling in your legs and feet, and gangrene of the feet. By keeping your blood sugar under control you can prevent or delay these problems.  Normal blood sugar levels are 80 to 100 before a meal and less than 180 in the 1 to 2 hours after a meal.  Home care  Follow these guidelines when caring for yourself at home:  · Follow the diet your healthcare provider gives you. Take insulin or other diabetes medicine exactly as told to.  · Watch your blood sugar as you are told to. Keep a log of your results. This will help your provider change your medicines to keep your blood sugar under control.  · Try to reach your ideal weight. You may be able to cut back on or not have to take diabetes medicine if you eat the right foods and get exercise.  · Do  not smoke. Smoking worsens the effects of diabetes on your circulation. You are much more likely to have a heart attack if you have diabetes and you smoke.  · Take good care of your feet. If you have lost feeling in your feet, you may not see an injury or infection. Check your feet and between your toes at least once a week.  · Wear a medical alert bracelet or necklace, or carry a card in your wallet that says you have diabetes. This will help healthcare providers give you the right care if you get very ill and cannot tell them that you have diabetes.  Sick day plan  If you get a cold, the flu, or a bacterial or viral infection, take these steps:  · Look at your diabetes sick plan and call your healthcare provider as you were told to. You may need to call your provider right away if:  ¨ Your blood sugar is above 240 while taking your diabetes medicine  ¨ Your urine ketone levels are above normal or high  ¨ You have been vomiting more than 6 hours  ¨ You have trouble breathing or your breath ha s a fruity smell  ¨ You have a high fever  ¨ You have a fever for several days and you are not getting better  ¨ You get light-headed and are sleepier than usual  · Keep taking your diabetes pills (oral medicine) even if you have been vomiting and are feeling sick. Call your provider right away because you may need insulin to lower your blood sugar until you recover from your illness.  · Keep taking your insulin even if you have been vomiting and are feeling sick. Call your provider right away to ask if you need to change your insulin dose. This will depend on your blood sugar results.  · Check your blood sugar every 2 to 4 hours, or at least 4 times a day.  · Check your ketones often. If you are vomiting and having diarrhea, watch them more often.  · Do not skip meals. Try to eat small meals on a regular schedule. Do this even if you do not feel like eating.  · Drink water or other liquids that do not have caffeine or  calories. This will keep you from getting dehydrated. If you are nauseated or vomiting, takes small sips every 5 minutes. To prevent dehydration try to drink a cup (8 ounces) of fluids every hour while you are awake.  General care  Always bring a source of fast-acting sugar with you in case you have symptoms of low blood sugar (below 70). At the first sign of low blood sugar, eat or drink 15 to 20 grams of fast-acting sugar to raise your blood sugar. Examples are:  · 3 to 4 glucose tablets. You can buy these at most [employer].  · 4 ounces (1/2 cup) of regular (not diet) soft drinks  · 4 ounces (1/2 cup) of any fruit juice  · 8 ounces (1 cup) of milk  · 5 to 6 pieces of hard candy  · 1 tablespoon of honey  Check your blood sugar 15 minutes after treating yourself. If it is still below 70, take 15 to 20 more grams of fast-acting sugar. Test again in 15 minutes. If it returns to normal (70 or above), eat a snack or meal to keep your blood sugar in a safe range. If it stays low, call your doctor or go to an emergency room.  Follow-up care  Follow-up with your healthcare provider, or as advised. For more information about diabetes, visit the American Diabetes Association website at www.diabetes.org or call 310-497-2474.  When to seek medical advice  Call your healthcare provider right away if you have any of these symptoms of high blood sugar:  · Frequent urination  · Dizziness  · Drowsiness  · Thirst  · Headache  · Nausea or vomiting  · Abdominal pain  · Eyesight changes  · Fast breathing  · Confusion or loss of consciousness  Also call your provider right away if you have any of these signs of low blood sugar:  · Fatigue  · Headache  · Shakes  · Excess sweating  · Hunger  · Feeling anxious or restless  · Eyesight changes  · Drowsiness  · Weakness  · Confusion or loss of consciousness  Call 911  Call for emergency help right away if any of these occur:  · Chest pain or shortness of breath  · Dizziness or  fainting  · Weakness of an arm or leg or one side of the face  · Trouble speaking or seeing   Date Last Reviewed: 6/1/2016  © 3404-2217 [employer]. [address] [place] [address], [place] [address]. All rights reserved. This information is not intended as a substitute for professional medical care. Always follow your healthcare professional's instructions.

## 2017-05-13 LAB
ESTIMATED AVG GLUCOSE: 157 MG/DL
HBA1C MFR BLD HPLC: 7.1 %

## 2017-05-17 RX ORDER — ATORVASTATIN CALCIUM 20 MG/1
TABLET, FILM COATED ORAL
Qty: 90 TABLET | Refills: 3 | OUTPATIENT
Start: 2017-05-17

## 2017-05-23 ENCOUNTER — TELEPHONE (OUTPATIENT)
Dept: FAMILY MEDICINE | Facility: CLINIC | Age: 67
End: 2017-05-23

## 2017-05-23 DIAGNOSIS — E11.69 HYPERLIPIDEMIA ASSOCIATED WITH TYPE 2 DIABETES MELLITUS: ICD-10-CM

## 2017-05-23 DIAGNOSIS — N28.9 DECREASED RENAL FUNCTION: Primary | ICD-10-CM

## 2017-05-23 DIAGNOSIS — E78.5 HYPERLIPIDEMIA ASSOCIATED WITH TYPE 2 DIABETES MELLITUS: ICD-10-CM

## 2017-05-23 RX ORDER — ATORVASTATIN CALCIUM 20 MG/1
TABLET, FILM COATED ORAL
Qty: 90 TABLET | Refills: 3 | OUTPATIENT
Start: 2017-05-23

## 2017-05-23 RX ORDER — ATORVASTATIN CALCIUM 80 MG/1
80 TABLET, FILM COATED ORAL DAILY
Qty: 90 TABLET | Refills: 3 | Status: SHIPPED | OUTPATIENT
Start: 2017-05-23 | End: 2018-05-28 | Stop reason: SDUPTHER

## 2017-05-23 NOTE — TELEPHONE ENCOUNTER
Kidney function is worse than baseline. He needs to stop any NSAIDS, stay hydrated, have repeat BMP in 2 weeks.    Hga1c is 7.1%, goal is less than 7%. He will need to be on a third medication. However I want to see what his repeat kidney function is first.     His lipitor needs to be increased to 80 mg daily and triglycerides are high, has he been taking the fenofibrate?  Let me know. Needs repeat CMP, lipid panel in 3 months.

## 2017-05-24 NOTE — TELEPHONE ENCOUNTER
Patient notified and states understanding.patient states he will take 2 tabs of 40mg of lipitor to equal 80mg. Patient states he is taking fenofibrate everyday.2 week f/u lab scheduled; 3 month f/u lab schedule.

## 2017-06-01 ENCOUNTER — OFFICE VISIT (OUTPATIENT)
Dept: OPTOMETRY | Facility: CLINIC | Age: 67
End: 2017-06-01
Payer: MEDICARE

## 2017-06-01 DIAGNOSIS — Z96.1 PSEUDOPHAKIA: ICD-10-CM

## 2017-06-01 DIAGNOSIS — H52.7 REFRACTIVE ERROR: ICD-10-CM

## 2017-06-01 DIAGNOSIS — E11.9 DIABETES MELLITUS WITHOUT OPHTHALMIC MANIFESTATIONS: Primary | ICD-10-CM

## 2017-06-01 PROCEDURE — 99999 PR PBB SHADOW E&M-EST. PATIENT-LVL I: CPT | Mod: PBBFAC,,, | Performed by: OPTOMETRIST

## 2017-06-01 PROCEDURE — 99499 UNLISTED E&M SERVICE: CPT | Mod: S$GLB,,, | Performed by: OPTOMETRIST

## 2017-06-01 PROCEDURE — 92014 COMPRE OPH EXAM EST PT 1/>: CPT | Mod: S$GLB,,, | Performed by: OPTOMETRIST

## 2017-06-01 NOTE — PROGRESS NOTES
HPI     Presenting Complaint: Pt here today for yearly diabetic eye exam. Pt   states blood sugar levels have been controlled.    Hemoglobin A1C       Date                     Value               Ref Range             Status                05/12/2017               7.1 (H)             4.5 - 6.2 %           Final                01/12/2017               7.1 (H)             4.5 - 6.2 %           Final                 09/06/2016               7.4 (H)             4.5 - 6.2 %           Final              .    Pt states vision is stable. Pt sues OTC readers for small print.     (+) Art tears as needed for dryness   (- headaches  (-) diplopia   (-) flashes / (-) floaters      Last edited by Bill Concepcion, OD on 6/1/2017 10:03 AM. (History)        ROS     Positive for: Endocrine (DM type 2), Cardiovascular (HTN), Eyes    Negative for: Constitutional, Gastrointestinal, Neurological, Skin,   Genitourinary, Musculoskeletal, HENT, Respiratory, Psychiatric,   Allergic/Imm, Heme/Lymph    Last edited by Bill Concepcion, OD on 6/1/2017 10:03 AM. (History)        Assessment /Plan     For exam results, see Encounter Report.    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 Discussed possible ocular affects of uncontrolled blood sugar with patient. Recommended continued strong blood sugar control and continued care with PCP. Monitor yearly.     S/p cataract extraction with good results. Pt happy with uncorrected vision, denies refraction. Return prn for spec Rx.       RTC in 1 year for comprehensive eye exam, or sooner prn.

## 2017-06-07 ENCOUNTER — CLINICAL SUPPORT (OUTPATIENT)
Dept: DIABETES | Facility: CLINIC | Age: 67
End: 2017-06-07
Payer: MEDICARE

## 2017-06-07 ENCOUNTER — LAB VISIT (OUTPATIENT)
Dept: LAB | Facility: HOSPITAL | Age: 67
End: 2017-06-07
Attending: FAMILY MEDICINE
Payer: MEDICARE

## 2017-06-07 ENCOUNTER — OFFICE VISIT (OUTPATIENT)
Dept: PODIATRY | Facility: CLINIC | Age: 67
End: 2017-06-07
Payer: MEDICARE

## 2017-06-07 VITALS — HEIGHT: 69 IN | BODY MASS INDEX: 43.1 KG/M2 | WEIGHT: 291 LBS

## 2017-06-07 VITALS — WEIGHT: 291 LBS | HEIGHT: 69 IN | BODY MASS INDEX: 43.1 KG/M2

## 2017-06-07 DIAGNOSIS — R60.9 EDEMA, UNSPECIFIED TYPE: ICD-10-CM

## 2017-06-07 DIAGNOSIS — N28.9 DECREASED RENAL FUNCTION: ICD-10-CM

## 2017-06-07 DIAGNOSIS — E11.9 TYPE 2 DIABETES, HBA1C GOAL < 7%: ICD-10-CM

## 2017-06-07 DIAGNOSIS — E11.9 ENCOUNTER FOR COMPREHENSIVE DIABETIC FOOT EXAMINATION, TYPE 2 DIABETES MELLITUS: Primary | ICD-10-CM

## 2017-06-07 DIAGNOSIS — Z90.5 SOLITARY KIDNEY, ACQUIRED: ICD-10-CM

## 2017-06-07 LAB
ANION GAP SERPL CALC-SCNC: 10 MMOL/L
BUN SERPL-MCNC: 37 MG/DL
CALCIUM SERPL-MCNC: 9.7 MG/DL
CHLORIDE SERPL-SCNC: 112 MMOL/L
CO2 SERPL-SCNC: 22 MMOL/L
CREAT SERPL-MCNC: 1.4 MG/DL
EST. GFR  (AFRICAN AMERICAN): 58.8 ML/MIN/1.73 M^2
EST. GFR  (NON AFRICAN AMERICAN): 50.9 ML/MIN/1.73 M^2
GLUCOSE SERPL-MCNC: 115 MG/DL
POTASSIUM SERPL-SCNC: 4.4 MMOL/L
SODIUM SERPL-SCNC: 144 MMOL/L

## 2017-06-07 PROCEDURE — 1126F AMNT PAIN NOTED NONE PRSNT: CPT | Mod: S$GLB,,, | Performed by: PODIATRIST

## 2017-06-07 PROCEDURE — 3045F PR MOST RECENT HEMOGLOBIN A1C LEVEL 7.0-9.0%: CPT | Mod: S$GLB,,, | Performed by: PODIATRIST

## 2017-06-07 PROCEDURE — 1159F MED LIST DOCD IN RCRD: CPT | Mod: S$GLB,,, | Performed by: PODIATRIST

## 2017-06-07 PROCEDURE — 99499 UNLISTED E&M SERVICE: CPT | Mod: S$GLB,,, | Performed by: PODIATRIST

## 2017-06-07 PROCEDURE — 99999 PR PBB SHADOW E&M-EST. PATIENT-LVL III: CPT | Mod: PBBFAC,,,

## 2017-06-07 PROCEDURE — 99999 PR PBB SHADOW E&M-EST. PATIENT-LVL III: CPT | Mod: PBBFAC,,, | Performed by: PODIATRIST

## 2017-06-07 PROCEDURE — 4010F ACE/ARB THERAPY RXD/TAKEN: CPT | Mod: S$GLB,,, | Performed by: PODIATRIST

## 2017-06-07 PROCEDURE — 99203 OFFICE O/P NEW LOW 30 MIN: CPT | Mod: S$GLB,,, | Performed by: PODIATRIST

## 2017-06-07 NOTE — LETTER
June 7, 2017      Adelaida Mckoy MD  [address] [place] [address]           [facility]  [address] [place] [address]  Phone: [phone]          Patient: Myron Noel   MR Number: [id number]   YOB: 1950   Date of Visit: 6/7/2017       Dear Dr. Adelaida Mckoy:    Thank you for referring Myron Noel to me for evaluation. Attached you will find relevant portions of my assessment and plan of care.    If you have questions, please do not hesitate to call me. I look forward to following Myron Noel along with you.    Sincerely,    Nelson Sepulveda, HADLEY    Enclosure  CC:  No Recipients    If you would like to receive this communication electronically, please contact [email or web address] or [phone] to request more information on [employer] access.    For providers and/or their staff who would like to refer a patient to [facility], please contact us through our one-stop-shop provider referral line, [facility], at [phone].    If you feel you have received this communication in error or would no longer like to receive these types of communications, please e-mail [email or web address]

## 2017-06-07 NOTE — PROGRESS NOTES
06/07/17 0000   Diabetes Education Visit   Diabetes Education Record Assessment/Progress Initial   Diabetes Type   Diabetes Type  Type II   Diabetes History   Diabetes Diagnosis >10 years   Nutrition   Meal Planning eats out often;3 meals per day;water  (Difficult to obtain intake recall)   Meal Plan 24 Hour Recall - Breakfast (Reports eating grape nuts and fruit loops for breakfast)   Meal Plan 24 Hour Recall - Lunch (Flatbread sandwich from [employer] with water)   Meal Plan 24 Hour Recall - Dinner (Last night he had pork chop cooked in the microwave)   Monitoring    Monitoring Other   Self Monitoring  (Reports blood sugar this am 133)   Blood Glucose Logs No   Exercise    Exercise Type (No routine exercise reports he mows lawn and that gives him exercise.  Used a cane to walk into office today)   Current Diabetes Treatment    Current Treatment Oral Medication  (10 mg glimepiride in am and 100 mg Januvia in am)   Social History   Preferred Learning Method Face to Face   Primary Support Self   Smoking Status Never a Smoker   Alcohol Use Never   Barriers to Change   Barriers to Change None   Learning Challenges  None   Readiness to Learn    Readiness to Learn  Eager   Cultural Influences   Cultural Influences No   Diabetes Education Assessment/Progress   Acute Complications (preventing, detecting, and treating acute complications) DC   Chronic Complications (preventing, detecting, and treating chronic complications) DC    Diabetes Disease Process (diabetes disease process and treatment options) DC   Nutrition (Incorporating nutritional management into one's lifestyle) DC Patient knowledgeable on diet from prior visits but reports he over eats, he cannot control his portions.  Provided handout on CKD and Carb counting per patient request.   Physical Activity (incorporating physical activity into one's lifestyle) DC Educated patient on importance of exercise with goal set to exercise 30 minutes per day   Medications  (states correct name, dose, onset, peak, duration, side effects & timing of meds) DC   Monitoring (monitoring blood glucose/other parameters & using results) DC    Goal Setting and Problem Solving (verbalizes behavior change strategies & sets realistic goals) DC   Behavior Change (developing personal strategies to health & behavior change) DC   Psychosocial Issues (developing personal srategies to address psychosocial concerns) DC   Goals   Healthy Eating Set   Start Date 06/07/17   Target Date 09/07/17   Physical Activity In Progress   Monitoring In Progress   Medications In Progress   Problem Solving In Progress   Healthy Coping In Progress   Reducing Risks In Progress   Diabetes Self-Management Support Plan   Exercise/Nutrition other   Other exercise/nutrition (Diabetes and Chronic Kidney Disease handout)   Stress Management family;friends   Review Status Patient has selected and agrees to support plan.   Diabetes Care Plan/Intervention   Education Plan/Intervention In F/U DSMT   Diabetes Meal Plan   Restrictions Restricted Carbohydrate   Calories 1500   Carbohydrate Per Meal 20-30g   Carbohydrate Per Snack  7-15g   Fat (Reduce intake of saturated fats)   Protein (Lean protein with meals and snacks)   Education Units of Time    Time Spent 60 min

## 2017-06-07 NOTE — PROGRESS NOTES
Subjective:      Patient ID: Myron Noel is a 67 y.o. male.    Chief Complaint: Diabetic Foot Exam ([facility] )    Myron is a 67 y.o. male who presents to the clinic for evaluation and treatment of high risk feet. Myron has a past medical history of Cataract; Chronic kidney disease (2001); Corneal abrasion, left; Diabetes mellitus; Gout, chronic; Hypertension; and Knee osteoarthritis (12/20/2013). The patient's chief complaint is long, thick toenails.  Gradual onset, worsening over past several weeks, aggravated by increased weight bearing, shoe gear, pressure.  No previous medical treatment.  OTC pain med not helping.   This patient has documented high risk feet requiring routine maintenance secondary to diabetes mellitis and those secondary complications of diabetes, as mentioned..    PCP: Adelaida Mckoy MD    Date Last Seen by PCP:   Chief Complaint   Patient presents with    Diabetic Foot Exam     [facility]          Current shoe gear:  Affected Foot: Casual shoes     Unaffected Foot: Casual shoes    Hemoglobin A1C   Date Value Ref Range Status   05/12/2017 7.1 (H) 4.5 - 6.2 % Final     Comment:     According to ADA guidelines, hemoglobin A1C <7.0% represents  optimal control in non-pregnant diabetic patients.  Different  metrics may apply to specific populations.   Standards of Medical Care in Diabetes - 2016.  For the purpose of screening for the presence of diabetes:  <5.7%     Consistent with the absence of diabetes  5.7-6.4%  Consistent with increasing risk for diabetes   (prediabetes)  >or=6.5%  Consistent with diabetes  Currently no consensus exists for use of hemoglobin A1C  for diagnosis of diabetes for children.     01/12/2017 7.1 (H) 4.5 - 6.2 % Final     Comment:     According to ADA guidelines, hemoglobin A1C <7.0% represents  optimal control in non-pregnant diabetic patients.  Different  metrics may apply to specific populations.   Standards of Medical Care in Diabetes - 2016.  For the purpose of  screening for the presence of diabetes:  <5.7%     Consistent with the absence of diabetes  5.7-6.4%  Consistent with increasing risk for diabetes   (prediabetes)  >or=6.5%  Consistent with diabetes  Currently no consensus exists for use of hemoglobin A1C  for diagnosis of diabetes for children.     09/06/2016 7.4 (H) 4.5 - 6.2 % Final     Comment:     According to ADA guidelines, hemoglobin A1C <7.0% represents  optimal control in non-pregnant diabetic patients.  Different  metrics may apply to specific populations.   Standards of Medical Care in Diabetes - 2016.  For the purpose of screening for the presence of diabetes:  <5.7%     Consistent with the absence of diabetes  5.7-6.4%  Consistent with increasing risk for diabetes   (prediabetes)  >or=6.5%  Consistent with diabetes  Currently no consensus exists for use of hemoglobin A1C  for diagnosis of diabetes for children.         Review of Systems   Constitution: Negative for chills, diaphoresis, fever, malaise/fatigue and night sweats.   Cardiovascular: Negative for claudication, cyanosis, leg swelling and syncope.   Skin: Negative for color change, dry skin, nail changes, rash, suspicious lesions and unusual hair distribution.   Musculoskeletal: Negative for falls, joint pain, joint swelling, muscle cramps, muscle weakness and stiffness.   Gastrointestinal: Negative for constipation, diarrhea, nausea and vomiting.   Neurological: Negative for brief paralysis, disturbances in coordination, focal weakness, numbness, paresthesias, sensory change and tremors.           Objective:      Physical Exam   Constitutional: He is oriented to person, place, and time. He appears well-developed and well-nourished. He is cooperative.   Oriented to time, place, and person.   Cardiovascular:   Pulses:       Dorsalis pedis pulses are 2+ on the right side, and 2+ on the left side.        Posterior tibial pulses are 1+ on the right side, and 1+ on the left side.   Capillary fill  time 3-5 seconds.  All toes warm to touch.      <2+ pitting lower extremity edema bilateral.    Negative elevational pallor and dependent rubor bilateral.     Musculoskeletal:   Normal angle, base, station of gait. Decreased stride length, early heel off, moderately propulsive toe off bilateral.    All ten toes without clubbing, cyanosis, or signs of ischemia.      No pain to palpation bilateral lower extremities.      Range of motion, stability, muscle strength, and muscle tone are age and health appropriate normal bilateral feet and legs.       Lymphadenopathy:   Negative lymphadenopathy bilateral popliteal fossa and tarsal tunnel.     Neurological: He is alert and oriented to person, place, and time. He has normal strength. He is not disoriented. He displays no atrophy and no tremor. No sensory deficit. He exhibits normal muscle tone.   Reflex Scores:       Patellar reflexes are 2+ on the right side and 2+ on the left side.       Achilles reflexes are 2+ on the right side and 2+ on the left side.    Negative tinel sign to percussion sural, superficial peroneal, deep peroneal, saphenous, and posterior tibial nerves right and left ankles and feet.     Skin: Skin is warm, dry and intact. No abrasion, no bruising, no burn, no ecchymosis, no laceration, no lesion, no petechiae and no rash noted. He is not diaphoretic. No cyanosis or erythema. No pallor. Nails show no clubbing.   Skin thin, atrophic, with decreased density and distribution of pedal hair bilateral, but without  federico discoloration,  ulcers, masses, nodules or cords palpated bilateral feet and legs.    Mild hyperpigmentation both legs consistent with stasis.    Toenails 1st, 2nd, 3rd, 4th, 5th  bilateral are hypertrophic thickened 2-3 mm, dystrophic, discolored tanish brown with tan, gray crumbly subungual debris.  Long, not tender to distal nail plate pressure, without periungual skin abnormality of each.               Assessment:       Encounter  Diagnoses   Name Primary?    Encounter for comprehensive diabetic foot examination, type 2 diabetes mellitus Yes    Edema, unspecified type     Solitary kidney, acquired          Plan:       Myron was seen today for diabetic foot exam.    Diagnoses and all orders for this visit:    Encounter for comprehensive diabetic foot examination, type 2 diabetes mellitus    Edema, unspecified type    Solitary kidney, acquired      I counseled the patient on his conditions, their implications and medical management.        - Shoe inspection. Diabetic Foot Education. Patient reminded of the importance of good nutrition and blood sugar control to help prevent podiatric complications of diabetes. Patient instructed on proper foot hygeine. We discussed wearing proper shoe gear, daily foot inspections, never walking without protective shoe gear, never putting sharp instruments to feet, routine podiatric visits at least annually.      Declines non covered foot care.    Discussed conservative treatment with shoes of adequate dimensions, material, and style to alleviate symptoms and delay or prevent surgical intervention.         Return in about 1 year (around 6/7/2018).

## 2017-06-26 ENCOUNTER — LAB VISIT (OUTPATIENT)
Dept: LAB | Facility: HOSPITAL | Age: 67
End: 2017-06-26
Attending: INTERNAL MEDICINE
Payer: MEDICARE

## 2017-06-26 DIAGNOSIS — R80.9 MICROALBUMINURIA: ICD-10-CM

## 2017-06-26 DIAGNOSIS — E11.00 TYPE II DIABETES MELLITUS WITH HYPEROSMOLARITY, UNCONTROLLED: ICD-10-CM

## 2017-06-26 DIAGNOSIS — R60.9 EDEMA: ICD-10-CM

## 2017-06-26 DIAGNOSIS — R94.4 NONSPECIFIC ABNORMAL RESULTS OF KIDNEY FUNCTION STUDY: ICD-10-CM

## 2017-06-26 DIAGNOSIS — E78.5 HYPERLIPEMIA: ICD-10-CM

## 2017-06-26 DIAGNOSIS — I10 ESSENTIAL HYPERTENSION, MALIGNANT: ICD-10-CM

## 2017-06-26 DIAGNOSIS — E87.20 ACIDOSIS: ICD-10-CM

## 2017-06-26 DIAGNOSIS — N25.81 SECONDARY HYPERPARATHYROIDISM: ICD-10-CM

## 2017-06-26 DIAGNOSIS — Z90.5 ACQUIRED ABSENCE OF KIDNEY: ICD-10-CM

## 2017-06-26 DIAGNOSIS — N18.30 CHRONIC KIDNEY DISEASE, STAGE III (MODERATE): ICD-10-CM

## 2017-06-26 DIAGNOSIS — R94.4 NONSPECIFIC ABNORMAL RESULTS OF KIDNEY FUNCTION STUDY: Primary | ICD-10-CM

## 2017-06-26 LAB
ALBUMIN SERPL BCP-MCNC: 3.8 G/DL
ANION GAP SERPL CALC-SCNC: 10 MMOL/L
BUN SERPL-MCNC: 38 MG/DL
CALCIUM SERPL-MCNC: 10.1 MG/DL
CHLORIDE SERPL-SCNC: 109 MMOL/L
CO2 SERPL-SCNC: 24 MMOL/L
CREAT SERPL-MCNC: 1.3 MG/DL
EST. GFR  (AFRICAN AMERICAN): >60 ML/MIN/1.73 M^2
EST. GFR  (NON AFRICAN AMERICAN): 55.7 ML/MIN/1.73 M^2
GLUCOSE SERPL-MCNC: 111 MG/DL
PHOSPHATE SERPL-MCNC: 2.7 MG/DL
POTASSIUM SERPL-SCNC: 3.8 MMOL/L
SODIUM SERPL-SCNC: 143 MMOL/L

## 2017-06-26 PROCEDURE — 80069 RENAL FUNCTION PANEL: CPT

## 2017-06-26 PROCEDURE — 36415 COLL VENOUS BLD VENIPUNCTURE: CPT | Mod: PO

## 2017-07-06 RX ORDER — FENOFIBRATE 160 MG/1
TABLET ORAL
Qty: 90 TABLET | Refills: 0 | Status: SHIPPED | OUTPATIENT
Start: 2017-07-06 | End: 2017-07-16 | Stop reason: SDUPTHER

## 2017-07-16 RX ORDER — FENOFIBRATE 160 MG/1
160 TABLET ORAL DAILY
Qty: 90 TABLET | Refills: 3 | Status: SHIPPED | OUTPATIENT
Start: 2017-07-16 | End: 2018-05-28 | Stop reason: SDUPTHER

## 2017-07-16 NOTE — TELEPHONE ENCOUNTER
Repeat renal function improved. If he can try to eat a low sugar/carb diet and really work on weight loss we wont have to add a third medication. Needs repeat Hga1c in 3 months.

## 2017-08-08 RX ORDER — GLIPIZIDE 10 MG/1
TABLET ORAL
Qty: 180 TABLET | Refills: 3 | Status: SHIPPED | OUTPATIENT
Start: 2017-08-08 | End: 2018-07-29 | Stop reason: SDUPTHER

## 2017-08-12 RX ORDER — ALLOPURINOL 300 MG/1
TABLET ORAL
Qty: 180 TABLET | Refills: 3 | Status: SHIPPED | OUTPATIENT
Start: 2017-08-12 | End: 2018-07-29 | Stop reason: SDUPTHER

## 2017-08-25 ENCOUNTER — LAB VISIT (OUTPATIENT)
Dept: LAB | Facility: HOSPITAL | Age: 67
End: 2017-08-25
Attending: FAMILY MEDICINE
Payer: MEDICARE

## 2017-08-25 DIAGNOSIS — E78.5 HYPERLIPIDEMIA ASSOCIATED WITH TYPE 2 DIABETES MELLITUS: ICD-10-CM

## 2017-08-25 DIAGNOSIS — E11.69 HYPERLIPIDEMIA ASSOCIATED WITH TYPE 2 DIABETES MELLITUS: ICD-10-CM

## 2017-08-25 LAB
ALBUMIN SERPL BCP-MCNC: 3.8 G/DL
ALP SERPL-CCNC: 72 U/L
ALT SERPL W/O P-5'-P-CCNC: 28 U/L
ANION GAP SERPL CALC-SCNC: 12 MMOL/L
AST SERPL-CCNC: 24 U/L
BILIRUB SERPL-MCNC: 0.6 MG/DL
BUN SERPL-MCNC: 33 MG/DL
CALCIUM SERPL-MCNC: 9.7 MG/DL
CHLORIDE SERPL-SCNC: 103 MMOL/L
CHOLEST/HDLC SERPL: 7.1 {RATIO}
CO2 SERPL-SCNC: 26 MMOL/L
CREAT SERPL-MCNC: 1.4 MG/DL
EST. GFR  (AFRICAN AMERICAN): 58.8 ML/MIN/1.73 M^2
EST. GFR  (NON AFRICAN AMERICAN): 50.9 ML/MIN/1.73 M^2
GLUCOSE SERPL-MCNC: 127 MG/DL
HDL/CHOLESTEROL RATIO: 14.1 %
HDLC SERPL-MCNC: 170 MG/DL
HDLC SERPL-MCNC: 24 MG/DL
LDLC SERPL CALC-MCNC: 73.2 MG/DL
NONHDLC SERPL-MCNC: 146 MG/DL
POTASSIUM SERPL-SCNC: 3.8 MMOL/L
PROT SERPL-MCNC: 7 G/DL
SODIUM SERPL-SCNC: 141 MMOL/L
TRIGL SERPL-MCNC: 364 MG/DL

## 2017-08-25 PROCEDURE — 80053 COMPREHEN METABOLIC PANEL: CPT

## 2017-08-25 PROCEDURE — 36415 COLL VENOUS BLD VENIPUNCTURE: CPT | Mod: PO

## 2017-08-25 PROCEDURE — 80061 LIPID PANEL: CPT

## 2017-10-10 RX ORDER — SITAGLIPTIN 100 MG/1
TABLET, FILM COATED ORAL
Qty: 90 TABLET | Refills: 3 | Status: SHIPPED | OUTPATIENT
Start: 2017-10-10 | End: 2017-10-12

## 2017-10-11 ENCOUNTER — TELEPHONE (OUTPATIENT)
Dept: FAMILY MEDICINE | Facility: CLINIC | Age: 67
End: 2017-10-11

## 2017-10-11 NOTE — TELEPHONE ENCOUNTER
Received fax from Pharmacy regarding Januvia. States that cost for pt is $449.14. Asking for alternative. Please advise.

## 2017-10-12 RX ORDER — ACARBOSE 25 MG/1
25 TABLET ORAL
Qty: 270 TABLET | Refills: 3 | Status: SHIPPED | OUTPATIENT
Start: 2017-10-12 | End: 2018-03-08 | Stop reason: SDUPTHER

## 2017-10-12 NOTE — TELEPHONE ENCOUNTER
Glipizide including ER and XL, metformin including ER,glimepride, acarbose,Glipizide -metformin,nateglinide,pioglitazone,pioglitazone-glimepiride,pioglitazone-metformin,rapaglinide,tolazamide,tolbutamide   All are tier 1 medications

## 2017-10-12 NOTE — TELEPHONE ENCOUNTER
He is on the maximum dose of glipizide.  He cannot be on metformin due to GI side effects even with the extended release.     Needs to continue with glipizide, can stop januvia and will prescribe acarbose 25 mg TID with meals-needs to be taken with first bite of food. Needs to drop off BG logs in 2 weeks with alternating fasting and 2 hour postprandial.

## 2017-10-25 RX ORDER — LISINOPRIL 20 MG/1
20 TABLET ORAL DAILY
Qty: 90 TABLET | Refills: 3 | Status: ON HOLD | OUTPATIENT
Start: 2017-10-25 | End: 2018-08-15 | Stop reason: HOSPADM

## 2017-11-10 ENCOUNTER — DOCUMENTATION ONLY (OUTPATIENT)
Dept: FAMILY MEDICINE | Facility: CLINIC | Age: 67
End: 2017-11-10

## 2017-11-10 NOTE — PROGRESS NOTES
Pre-Visit Chart Review  For Appointment Scheduled on 11/13/17.    Health Maintenance Due   Topic Date Due    Influenza Vaccine  08/01/2017    Hemoglobin A1c  08/12/2017

## 2017-11-13 ENCOUNTER — OFFICE VISIT (OUTPATIENT)
Dept: FAMILY MEDICINE | Facility: CLINIC | Age: 67
End: 2017-11-13
Payer: MEDICARE

## 2017-11-13 VITALS
SYSTOLIC BLOOD PRESSURE: 119 MMHG | HEART RATE: 77 BPM | WEIGHT: 291.25 LBS | DIASTOLIC BLOOD PRESSURE: 67 MMHG | BODY MASS INDEX: 43.14 KG/M2 | TEMPERATURE: 99 F | HEIGHT: 69 IN

## 2017-11-13 DIAGNOSIS — M54.41 CHRONIC RIGHT-SIDED LOW BACK PAIN WITH RIGHT-SIDED SCIATICA: ICD-10-CM

## 2017-11-13 DIAGNOSIS — E11.29 MICROALBUMINURIA DUE TO TYPE 2 DIABETES MELLITUS: ICD-10-CM

## 2017-11-13 DIAGNOSIS — E66.01 OBESITY, CLASS III, BMI 40-49.9 (MORBID OBESITY): ICD-10-CM

## 2017-11-13 DIAGNOSIS — E11.69 HYPERLIPIDEMIA ASSOCIATED WITH TYPE 2 DIABETES MELLITUS: ICD-10-CM

## 2017-11-13 DIAGNOSIS — I15.2 HYPERTENSION ASSOCIATED WITH DIABETES: ICD-10-CM

## 2017-11-13 DIAGNOSIS — M25.559 ARTHRALGIA OF HIP, UNSPECIFIED LATERALITY: ICD-10-CM

## 2017-11-13 DIAGNOSIS — E78.5 HYPERLIPIDEMIA ASSOCIATED WITH TYPE 2 DIABETES MELLITUS: ICD-10-CM

## 2017-11-13 DIAGNOSIS — E11.59 HYPERTENSION ASSOCIATED WITH DIABETES: ICD-10-CM

## 2017-11-13 DIAGNOSIS — G89.29 CHRONIC RIGHT-SIDED LOW BACK PAIN WITH RIGHT-SIDED SCIATICA: ICD-10-CM

## 2017-11-13 DIAGNOSIS — N18.30 CKD (CHRONIC KIDNEY DISEASE) STAGE 3, GFR 30-59 ML/MIN: ICD-10-CM

## 2017-11-13 DIAGNOSIS — K21.9 GASTROESOPHAGEAL REFLUX DISEASE, ESOPHAGITIS PRESENCE NOT SPECIFIED: ICD-10-CM

## 2017-11-13 DIAGNOSIS — R80.9 MICROALBUMINURIA DUE TO TYPE 2 DIABETES MELLITUS: ICD-10-CM

## 2017-11-13 PROCEDURE — 99214 OFFICE O/P EST MOD 30 MIN: CPT | Mod: S$GLB,,, | Performed by: FAMILY MEDICINE

## 2017-11-13 PROCEDURE — 99499 UNLISTED E&M SERVICE: CPT | Mod: S$GLB,,, | Performed by: FAMILY MEDICINE

## 2017-11-13 PROCEDURE — 99999 PR PBB SHADOW E&M-EST. PATIENT-LVL V: CPT | Mod: PBBFAC,,, | Performed by: FAMILY MEDICINE

## 2017-11-13 RX ORDER — ACETAMINOPHEN 500 MG
1 TABLET ORAL DAILY
COMMUNITY
End: 2018-05-28

## 2017-11-13 RX ORDER — PANTOPRAZOLE SODIUM 40 MG/1
40 TABLET, DELAYED RELEASE ORAL DAILY
Status: ON HOLD | COMMUNITY
End: 2018-08-15 | Stop reason: HOSPADM

## 2017-11-13 RX ORDER — HYDROCODONE BITARTRATE AND ACETAMINOPHEN 7.5; 325 MG/1; MG/1
1 TABLET ORAL EVERY 6 HOURS PRN
COMMUNITY
End: 2018-05-28

## 2017-11-13 NOTE — PROGRESS NOTES
CHIEF COMPLAINT: follow up      HISTORY OF PRESENT ILLNESS:  Myron Noel is a 67 y.o. male patient who presents to clinic for follow up on his chronic medical conditions.     1. Type 2 DM, microalbuminuria: A last  HgA1c was 7.1%. He is on precose, glucotrol. He is on an ACE-I, statin, ASA. He had evidence of microalbuminuria and is established with Dr. De Anda. He is up to date on his immunizations. He follows up with an ophthalmologist.  He is up to date on his foot exam    2. Hyperlipidemia: he is on lipitor, fenofibrate, ASA. He denies any dark colored urine, myalgia. He is due for labs.    3. HTN: patient is on lisinopril  and lasix. He denies any CP, cough, SOB.     4. CKD III: He followos up with Dr. De Anda    5. He requests referral to pain management for chronic back and hip pain.    REVIEW OF SYSTEMS:  The patient denies any fever, chills, night sweats, headaches, vision changes, difficulty speaking or swallowing, decreased hearing, weight loss, weight gain, chest pain, palpitations, shortness of breath, cough, nausea, vomiting, abdominal pain, dysuria, diarrhea, constipation, hematuria, hematochezia, melena, changes in his hair, skin,, numbness or weakness in his extremities, over any of his joints, myalgia, swollen glands, easy bruising, fatigue, edema.       MEDICATIONS:   Reviewed and/or reconciled in EPIC    ALLERGIES:  Reviewed and/or reconciled in [facility]    PAST MEDICAL/SURGICAL HISTORY:   Past Medical History:   Diagnosis Date    Cataract     ou done//    Chronic kidney disease 2001    nephrectomy for obstructive stones    Corneal abrasion, left     With leaf 40 yrs ago    Diabetes mellitus     Gout, chronic     Hypertension     Knee osteoarthritis 12/20/2013      Past Surgical History:   Procedure Laterality Date    APPENDECTOMY      CATARACT EXTRACTION  08/06/2014    od    CATARACT EXTRACTION W/  INTRAOCULAR LENS IMPLANT Left 11/5/14    EYE SURGERY      NEPHRECTOMY      TONSILLECTOMY          FAMILY HISTORY:    Family History   Problem Relation Age of Onset    Heart defect Father     Alzheimer's disease Maternal Aunt     Diabetes Maternal Grandmother     Melanoma Neg Hx     Psoriasis Neg Hx     Lupus Neg Hx     Eczema Neg Hx        SOCIAL HISTORY:    Social History     Social History    Marital status: Single     Spouse name: N/A    Number of children: N/A    Years of education: N/A     Occupational History    Not on file.     Social History Main Topics    Smoking status: Former Smoker     Types: Pipe    Smokeless tobacco: Never Used    Alcohol use No    Drug use: No    Sexual activity: Yes     Partners: Female     Other Topics Concern    Not on file     Social History Narrative    No narrative on file       PHYSICAL EXAM:  VITAL SIGNS:   There were no vitals filed for this visit.  GENERAL:  Patient appears well nourished, sitting on exam table, in no acute distress.  HEENT:  Atraumatic, normocephalic, PERRLA, EOMI, no conjunctival injection, sclerae are anicteric, normal external auditory canals,TMs clear b/l, gross hearing intact to whisper, MMM, no oropharygneal erythema or exudate.  NECK:  Supple, normal ROM, trachea is midline , no supraclavicular or cervical LAD or masses palpated.   CARDIOVASCULAR:  RRR, normal S1 and S2, no m/r/g.  RESPIRATORY:  CTA b/l, no wheezes, rhonchi, rales.  No increased work of breathing, no  use of accessory muscles.  ABDOMEN:  Soft, nontender, nondistended, normoactive bowel sounds in all four quadrants, no rebound or guarding, no HSM or masses palpated.  Normal percussion.  EXTREMITIES:  2+ DP pulses b/l, no edema.  SKIN:  Warm, no lesions on exposed skin.  NEUROMUSCULAR:  Cranial nerves II-XII grossly intact. There is redness, swelling, erythema over dorsal surface of left foot. No clubbing or cyanosis of digits/nails, there is onychomycosis of finger and toenails  Steady gait.  PSYCH:  Patient is alert and oriented to person, time, place.  They are appropriately dressed and groomed. There is normal eye contact. Rate and tone of speech is normal. Normal insight, judgement. Normal thought content and process.       LABORATORY/IMAGING STUDIES: pending    ASSESSMENT/PLAN: This is a 67 y.o. male who presents to clinic for evaluation of the following concerns  1. Uncontrolled type 2 diabetes mellitus with microalbuminuria, without long-term current use of insulin  See below    2. Obesity, Class III, BMI 40-49.9 (morbid obesity)  See below    3. Hypertension associated with diabetes  See below    4. Hyperlipidemia associated with type 2 diabetes mellitus  - Comprehensive metabolic panel; Future  - Lipid panel; Future      5. CKD (chronic kidney disease) stage 3, GFR 30-59 ml/min   follow up with Dr. De Anda.    6. Microalbuminuria due to type 2 diabetes mellitus   follow up with Dr. De Anda.    7. Arthralgia of hip, unspecified laterality  - Ambulatory referral to Pain Clinic    8. Chronic right-sided low back pain with right-sided sciatica  - Ambulatory referral to Pain Clinic    9. Gastroesophageal reflux disease, esophagitis presence not specified  protonix 40 mg daily, GERD precautions      Patient readiness: acceptance and barriers:none    During the course of the visit the patient was educated and counseled about the following:     Diabetes:  HgA1c,lipid panel, CMP  Hypertension: continue with current medications.  Obesity:   General weight loss/lifestyle modification strategies discussed (elicit support from others; identify saboteurs; non-food rewards, etc).  Diet interventions: moderate (500 kCal/d) deficit diet.  Informal exercise measures discussed, e.g. taking stairs instead of elevator.  Regular aerobic exercise program discussed.    Goals: Diabetes: Maintain Hemoglobin A1C below 7, Hypertension: Reduce Blood Pressure and Obesity: Reduce calorie intake and BMI    Did patient meet goals/outcomes: No    The following self management tools provided:  declined    Patient Instructions (the written plan) was given to the patient/family.     Time spent with patient: 30 minutes      FOLLOW UP: 6 months      Adelaida Mckoy MD

## 2017-12-01 ENCOUNTER — LAB VISIT (OUTPATIENT)
Dept: LAB | Facility: HOSPITAL | Age: 67
End: 2017-12-01
Attending: FAMILY MEDICINE
Payer: MEDICARE

## 2017-12-01 DIAGNOSIS — E78.5 HYPERLIPIDEMIA ASSOCIATED WITH TYPE 2 DIABETES MELLITUS: ICD-10-CM

## 2017-12-01 DIAGNOSIS — E11.69 HYPERLIPIDEMIA ASSOCIATED WITH TYPE 2 DIABETES MELLITUS: ICD-10-CM

## 2017-12-01 LAB
ALBUMIN SERPL BCP-MCNC: 3.6 G/DL
ALP SERPL-CCNC: 96 U/L
ALT SERPL W/O P-5'-P-CCNC: 38 U/L
ANION GAP SERPL CALC-SCNC: 9 MMOL/L
AST SERPL-CCNC: 36 U/L
BILIRUB SERPL-MCNC: 0.5 MG/DL
BUN SERPL-MCNC: 21 MG/DL
CALCIUM SERPL-MCNC: 9.5 MG/DL
CHLORIDE SERPL-SCNC: 109 MMOL/L
CHOLEST SERPL-MCNC: 154 MG/DL
CHOLEST/HDLC SERPL: 5.3 {RATIO}
CO2 SERPL-SCNC: 26 MMOL/L
CREAT SERPL-MCNC: 1.2 MG/DL
EST. GFR  (AFRICAN AMERICAN): >60 ML/MIN/1.73 M^2
EST. GFR  (NON AFRICAN AMERICAN): >60 ML/MIN/1.73 M^2
ESTIMATED AVG GLUCOSE: 160 MG/DL
GLUCOSE SERPL-MCNC: 132 MG/DL
HBA1C MFR BLD HPLC: 7.2 %
HDLC SERPL-MCNC: 29 MG/DL
HDLC SERPL: 18.8 %
LDLC SERPL CALC-MCNC: 86.8 MG/DL
NONHDLC SERPL-MCNC: 125 MG/DL
POTASSIUM SERPL-SCNC: 4 MMOL/L
PROT SERPL-MCNC: 7 G/DL
SODIUM SERPL-SCNC: 144 MMOL/L
TRIGL SERPL-MCNC: 191 MG/DL

## 2017-12-01 PROCEDURE — 83036 HEMOGLOBIN GLYCOSYLATED A1C: CPT

## 2017-12-01 PROCEDURE — 80061 LIPID PANEL: CPT

## 2017-12-01 PROCEDURE — 80053 COMPREHEN METABOLIC PANEL: CPT

## 2017-12-01 PROCEDURE — 36415 COLL VENOUS BLD VENIPUNCTURE: CPT | Mod: PO

## 2017-12-03 ENCOUNTER — TELEPHONE (OUTPATIENT)
Dept: FAMILY MEDICINE | Facility: CLINIC | Age: 67
End: 2017-12-03

## 2017-12-29 ENCOUNTER — LAB VISIT (OUTPATIENT)
Dept: LAB | Facility: HOSPITAL | Age: 67
End: 2017-12-29
Attending: INTERNAL MEDICINE
Payer: MEDICARE

## 2017-12-29 DIAGNOSIS — R80.9 MICROALBUMINURIA: ICD-10-CM

## 2017-12-29 DIAGNOSIS — E11.65 TYPE 2 DIABETES MELLITUS WITH HYPERGLYCEMIA: ICD-10-CM

## 2017-12-29 DIAGNOSIS — R60.9 EDEMA: ICD-10-CM

## 2017-12-29 DIAGNOSIS — E78.5 HYPERLIPIDEMIA: ICD-10-CM

## 2017-12-29 DIAGNOSIS — I10 HYPERTENSION, ESSENTIAL: ICD-10-CM

## 2017-12-29 DIAGNOSIS — Z90.5 ABSENT KIDNEY, ACQUIRED: ICD-10-CM

## 2017-12-29 DIAGNOSIS — E87.20 ACIDOSIS: ICD-10-CM

## 2017-12-29 DIAGNOSIS — N25.81 SECONDARY HYPERPARATHYROIDISM OF RENAL ORIGIN: ICD-10-CM

## 2017-12-29 DIAGNOSIS — R94.4 ABNORMAL RESULTS OF KIDNEY FUNCTION STUDIES: ICD-10-CM

## 2017-12-29 DIAGNOSIS — R94.4 ABNORMAL RESULTS OF KIDNEY FUNCTION STUDIES: Primary | ICD-10-CM

## 2017-12-29 DIAGNOSIS — N18.30 CHRONIC KIDNEY DISEASE, STAGE III (MODERATE): ICD-10-CM

## 2017-12-29 LAB
ALBUMIN SERPL BCP-MCNC: 3.8 G/DL
ANION GAP SERPL CALC-SCNC: 10 MMOL/L
BUN SERPL-MCNC: 31 MG/DL
CALCIUM SERPL-MCNC: 9.6 MG/DL
CHLORIDE SERPL-SCNC: 107 MMOL/L
CO2 SERPL-SCNC: 25 MMOL/L
CREAT SERPL-MCNC: 1.2 MG/DL
EST. GFR  (AFRICAN AMERICAN): >60 ML/MIN/1.73 M^2
EST. GFR  (NON AFRICAN AMERICAN): >60 ML/MIN/1.73 M^2
ESTIMATED AVG GLUCOSE: 151 MG/DL
GLUCOSE SERPL-MCNC: 162 MG/DL
HBA1C MFR BLD HPLC: 6.9 %
PHOSPHATE SERPL-MCNC: 2.6 MG/DL
POTASSIUM SERPL-SCNC: 3.9 MMOL/L
SODIUM SERPL-SCNC: 142 MMOL/L

## 2017-12-29 PROCEDURE — 36415 COLL VENOUS BLD VENIPUNCTURE: CPT | Mod: PO

## 2017-12-29 PROCEDURE — 83036 HEMOGLOBIN GLYCOSYLATED A1C: CPT

## 2017-12-29 PROCEDURE — 80069 RENAL FUNCTION PANEL: CPT

## 2018-01-25 ENCOUNTER — TELEPHONE (OUTPATIENT)
Dept: FAMILY MEDICINE | Facility: CLINIC | Age: 68
End: 2018-01-25

## 2018-01-25 ENCOUNTER — NURSE TRIAGE (OUTPATIENT)
Dept: ADMINISTRATIVE | Facility: CLINIC | Age: 68
End: 2018-01-25

## 2018-01-25 NOTE — TELEPHONE ENCOUNTER
Advised patient that he needs to see a dentist.  Patient states he doesn't have the money.  [facility].

## 2018-01-25 NOTE — TELEPHONE ENCOUNTER
Reason for Disposition   MORE THAN A DOUBLE DOSE of a prescription or over-the-counter (OTC) drug    Protocols used: ST MEDICATION QUESTION CALL-A-AH

## 2018-01-25 NOTE — TELEPHONE ENCOUNTER
----- Message from Annamaria Luna sent at 1/25/2018  3:58 PM CST -----  Contact: self  Call placed to pod.  Call connected to on call. Patient states he needs to have a tooth pulled however can't get in with a dentist. He would like an antibiotic called in or pain meds. Patient took 10 tylenol and 10 advil for pain before calling. Please call back at [phone] (home)

## 2018-01-26 NOTE — TELEPHONE ENCOUNTER
"  Answer Assessment - Initial Assessment Questions  1. SYMPTOMS: "Do you have any symptoms?"      SevereTooth pain---  *Triage called escalated to medication health concerns due to patient's statement he experience mild dizziness after take 10 Tylenol 500 mg nad 10 [employer] brand ibuprofen all at one time at 5p for tooth pain. Call placed to [facility] advised to seek medical care. Patient understood/agreed to such. Attempts to contact EC/Daughter per patient's request were unsuccessful.   2. SEVERITY: If symptoms are present, ask "Are they mild, moderate or severe?"      Mild dizziness    Protocols used: ST MEDICATION QUESTION CALL-A-AH    "

## 2018-02-05 ENCOUNTER — TELEPHONE (OUTPATIENT)
Dept: FAMILY MEDICINE | Facility: CLINIC | Age: 68
End: 2018-02-05

## 2018-02-05 NOTE — TELEPHONE ENCOUNTER
----- Message from Padmini Landaverde sent at 2/5/2018  9:29 AM CST -----  Contact: patient  Patient, Myron Noel, [phone] is calling to request an increase in blood pressure medication - lisinopril (PRINIVIL,ZESTRIL) 20 MG tablet.  Please advise.  Thanks!      [facility]/pharmacy #[id number] - [place] - [address]  [address] [place] [address]  Phone: [phone] Fax: [phone]

## 2018-02-08 ENCOUNTER — TELEPHONE (OUTPATIENT)
Dept: FAMILY MEDICINE | Facility: CLINIC | Age: 68
End: 2018-02-08

## 2018-02-08 NOTE — TELEPHONE ENCOUNTER
----- Message from Padmini Landaverde sent at 2/5/2018  9:29 AM CST -----  Contact: patient  Patient, Myron Noel, [phone] is calling to request an increase in blood pressure medication - lisinopril (PRINIVIL,ZESTRIL) 20 MG tablet.  Please advise.  Thanks!      [facility]/pharmacy #[id number] - [place] - [address]  [address] [place] [address]  Phone: [phone] Fax: [phone]

## 2018-03-08 ENCOUNTER — TELEPHONE (OUTPATIENT)
Dept: FAMILY MEDICINE | Facility: CLINIC | Age: 68
End: 2018-03-08

## 2018-03-08 RX ORDER — ACARBOSE 50 MG/1
50 TABLET ORAL
Qty: 270 TABLET | Refills: 3 | Status: ON HOLD | OUTPATIENT
Start: 2018-03-08 | End: 2018-08-15 | Stop reason: HOSPADM

## 2018-03-08 NOTE — TELEPHONE ENCOUNTER
Patient notified and states understanding.  Patient states he can not get medication until he gets paid. Notified patient to take 2 of 25mg 3 times a day until she is able to get new rx patient states understanding

## 2018-03-08 NOTE — TELEPHONE ENCOUNTER
Triglycerides improved. Renal function improved. HgA1c increased to 7.2%. I want to increase his precose to 50 mg TID with meals and will recheck labs when we see him in may

## 2018-04-03 DIAGNOSIS — G47.33 OBSTRUCTIVE SLEEP APNEA: Primary | ICD-10-CM

## 2018-04-03 RX ORDER — LANCETS
EACH MISCELLANEOUS
Qty: 100 EACH | Refills: 3 | Status: ON HOLD | OUTPATIENT
Start: 2018-04-03 | End: 2018-08-15 | Stop reason: HOSPADM

## 2018-04-03 NOTE — TELEPHONE ENCOUNTER
----- Message from Jessica Aguilar sent at 4/2/2018  2:32 PM CDT -----  Contact: Gabbie with [facility]  They need a new prescription for the patient's C-Pap supplies.  Call Back#[phone]  Fax#[phone]  Thanks

## 2018-04-26 DIAGNOSIS — E11.9 TYPE 2 DIABETES MELLITUS WITHOUT COMPLICATION: ICD-10-CM

## 2018-05-23 ENCOUNTER — DOCUMENTATION ONLY (OUTPATIENT)
Dept: FAMILY MEDICINE | Facility: CLINIC | Age: 68
End: 2018-05-23

## 2018-05-23 DIAGNOSIS — E11.8 TYPE 2 DIABETES MELLITUS WITH COMPLICATION, WITHOUT LONG-TERM CURRENT USE OF INSULIN: Primary | ICD-10-CM

## 2018-05-23 NOTE — PROGRESS NOTES
Pre-Visit Chart Review  For Appointment Scheduled on 5/28/18.    Health Maintenance Due   Topic Date Due    Colonoscopy  11/28/2017    Hemoglobin A1c  03/29/2018    Eye Exam  06/01/2018    Foot Exam  06/07/2018

## 2018-05-28 ENCOUNTER — OFFICE VISIT (OUTPATIENT)
Dept: FAMILY MEDICINE | Facility: CLINIC | Age: 68
End: 2018-05-28
Payer: MEDICARE

## 2018-05-28 VITALS
HEART RATE: 90 BPM | BODY MASS INDEX: 42.12 KG/M2 | TEMPERATURE: 98 F | SYSTOLIC BLOOD PRESSURE: 117 MMHG | WEIGHT: 284.38 LBS | HEIGHT: 69 IN | DIASTOLIC BLOOD PRESSURE: 69 MMHG

## 2018-05-28 DIAGNOSIS — N18.30 CKD (CHRONIC KIDNEY DISEASE) STAGE 3, GFR 30-59 ML/MIN: ICD-10-CM

## 2018-05-28 DIAGNOSIS — E11.29 MICROALBUMINURIA DUE TO TYPE 2 DIABETES MELLITUS: ICD-10-CM

## 2018-05-28 DIAGNOSIS — E78.5 HYPERLIPIDEMIA ASSOCIATED WITH TYPE 2 DIABETES MELLITUS: ICD-10-CM

## 2018-05-28 DIAGNOSIS — R80.9 TYPE 2 DIABETES MELLITUS WITH MICROALBUMINURIA, WITHOUT LONG-TERM CURRENT USE OF INSULIN: Primary | ICD-10-CM

## 2018-05-28 DIAGNOSIS — R80.9 MICROALBUMINURIA DUE TO TYPE 2 DIABETES MELLITUS: ICD-10-CM

## 2018-05-28 DIAGNOSIS — I15.2 HYPERTENSION ASSOCIATED WITH DIABETES: ICD-10-CM

## 2018-05-28 DIAGNOSIS — E11.29 TYPE 2 DIABETES MELLITUS WITH MICROALBUMINURIA, WITHOUT LONG-TERM CURRENT USE OF INSULIN: Primary | ICD-10-CM

## 2018-05-28 DIAGNOSIS — E66.01 OBESITY, CLASS III, BMI 40-49.9 (MORBID OBESITY): ICD-10-CM

## 2018-05-28 DIAGNOSIS — E11.59 HYPERTENSION ASSOCIATED WITH DIABETES: ICD-10-CM

## 2018-05-28 DIAGNOSIS — E11.69 HYPERLIPIDEMIA ASSOCIATED WITH TYPE 2 DIABETES MELLITUS: ICD-10-CM

## 2018-05-28 PROCEDURE — 99999 PR PBB SHADOW E&M-EST. PATIENT-LVL III: CPT | Mod: PBBFAC,,, | Performed by: FAMILY MEDICINE

## 2018-05-28 PROCEDURE — 99214 OFFICE O/P EST MOD 30 MIN: CPT | Mod: S$GLB,,, | Performed by: FAMILY MEDICINE

## 2018-05-28 PROCEDURE — 3044F HG A1C LEVEL LT 7.0%: CPT | Mod: CPTII,S$GLB,, | Performed by: FAMILY MEDICINE

## 2018-05-28 PROCEDURE — 3074F SYST BP LT 130 MM HG: CPT | Mod: CPTII,S$GLB,, | Performed by: FAMILY MEDICINE

## 2018-05-28 PROCEDURE — 99499 UNLISTED E&M SERVICE: CPT | Mod: S$GLB,,, | Performed by: FAMILY MEDICINE

## 2018-05-28 PROCEDURE — 3078F DIAST BP <80 MM HG: CPT | Mod: CPTII,S$GLB,, | Performed by: FAMILY MEDICINE

## 2018-05-28 RX ORDER — ATORVASTATIN CALCIUM 80 MG/1
80 TABLET, FILM COATED ORAL DAILY
Qty: 90 TABLET | Refills: 3 | Status: ON HOLD | OUTPATIENT
Start: 2018-05-28 | End: 2018-08-15 | Stop reason: HOSPADM

## 2018-05-28 RX ORDER — FENOFIBRATE 160 MG/1
160 TABLET ORAL DAILY
Qty: 90 TABLET | Refills: 3 | Status: ON HOLD | OUTPATIENT
Start: 2018-05-28 | End: 2018-08-15 | Stop reason: HOSPADM

## 2018-05-28 NOTE — PROGRESS NOTES
CHIEF COMPLAINT: follow up type 2 DM, HTN, hyperlipidemia      HISTORY OF PRESENT ILLNESS:  Myron Noel is a 68 y.o. male patient who presents to clinic for follow up on his chronic medical conditions.     1. Type 2 DM, microalbuminuria: A last  HgA1c was 6.9%. He is on precose, glucotrol. He is on an ACE-I, statin, ASA. He had evidence of microalbuminuria and is established with Dr. De Anda. He is up to date on his immunizations. He follows up with an ophthalmologist.  He is up to date on his foot exam    2. Hyperlipidemia: he is on lipitor, fenofibrate, ASA. He denies any dark colored urine, myalgia. He is due for labs.    3. HTN: patient is on lisinopril  and lasix. He denies any CP, cough, SOB.     4. CKD III: He follows up with Dr. De Anda      REVIEW OF SYSTEMS:  The patient denies any fever, chills, night sweats, headaches, vision changes, difficulty speaking or swallowing, decreased hearing, weight loss, weight gain, chest pain, palpitations, shortness of breath, cough, nausea, vomiting, abdominal pain, dysuria, diarrhea, constipation, hematuria, hematochezia, melena, changes in his hair, skin,, numbness or weakness in his extremities, over any of his joints, myalgia, swollen glands, easy bruising, fatigue, edema.       MEDICATIONS:   Reviewed and/or reconciled in EPIC    ALLERGIES:  Reviewed and/or reconciled in [facility]    PAST MEDICAL/SURGICAL HISTORY:   Past Medical History:   Diagnosis Date    Cataract     ou done//    Chronic kidney disease 2001    nephrectomy for obstructive stones    Corneal abrasion, left     With leaf 40 yrs ago    Diabetes mellitus     Gout, chronic     Hypertension     Knee osteoarthritis 12/20/2013      Past Surgical History:   Procedure Laterality Date    APPENDECTOMY      CATARACT EXTRACTION  08/06/2014    od    CATARACT EXTRACTION W/  INTRAOCULAR LENS IMPLANT Left 11/5/14    EYE SURGERY      NEPHRECTOMY      TONSILLECTOMY         FAMILY HISTORY:    Family History  "  Problem Relation Age of Onset    Heart defect Father     Alzheimer's disease Maternal Aunt     Diabetes Maternal Grandmother     Melanoma Neg Hx     Psoriasis Neg Hx     Lupus Neg Hx     Eczema Neg Hx        SOCIAL HISTORY:    Social History     Social History    Marital status: Single     Spouse name: N/A    Number of children: N/A    Years of education: N/A     Occupational History    Not on file.     Social History Main Topics    Smoking status: Former Smoker     Types: Pipe    Smokeless tobacco: Never Used    Alcohol use No    Drug use: No    Sexual activity: Yes     Partners: Female     Other Topics Concern    Not on file     Social History Narrative    No narrative on file       PHYSICAL EXAM:  VITAL SIGNS:   Vitals:    05/28/18 1408   BP: 117/69   BP Location: Right arm   Patient Position: Sitting   BP Method: Medium (Automatic)   Pulse: 90   Temp: 97.5 °F (36.4 °C)   TempSrc: Oral   Weight: 129 kg (284 lb 6.3 oz)   Height: 5' 9" (1.753 m)     GENERAL:  Patient appears well nourished, sitting on exam table, in no acute distress.  HEENT:  Atraumatic, normocephalic, PERRLA, EOMI, no conjunctival injection, sclerae are anicteric, normal external auditory canals,TMs clear b/l, gross hearing intact to whisper, MMM, no oropharygneal erythema or exudate.  NECK:  Supple, normal ROM, trachea is midline , no supraclavicular or cervical LAD or masses palpated.   CARDIOVASCULAR:  RRR, normal S1 and S2, no m/r/g.  RESPIRATORY:  CTA b/l, no wheezes, rhonchi, rales.  No increased work of breathing, no  use of accessory muscles.  ABDOMEN:  Soft, nontender, nondistended, normoactive bowel sounds in all four quadrants, no rebound or guarding, no HSM or masses palpated.  Normal percussion.  EXTREMITIES:  2+ DP pulses b/l, no edema.  SKIN:  Warm, no lesions on exposed skin.  NEUROMUSCULAR:  Cranial nerves II-XII grossly intact. There is redness, swelling, erythema over dorsal surface of left foot. No clubbing " or cyanosis of digits/nails, there is onychomycosis of finger and toenails  Steady gait.  PSYCH:  Patient is alert and oriented to person, time, place. They are appropriately dressed and groomed. There is normal eye contact. Rate and tone of speech is normal. Normal insight, judgement. Normal thought content and process.       LABORATORY/IMAGING STUDIES: pending    ASSESSMENT/PLAN: This is a 68 y.o. male who presents to clinic for evaluation of the following concerns  1. controlled type 2 diabetes mellitus with microalbuminuria, without long-term current use of insulin  See below    2. Obesity, Class III, BMI 40-49.9 (morbid obesity)  See below    3. Hypertension associated with diabetes  See below    4. Hyperlipidemia associated with type 2 diabetes mellitus  - Comprehensive metabolic panel; Future  - Lipid panel; Future      5. CKD (chronic kidney disease) stage 3, GFR 30-59 ml/min   follow up with Dr. De Anda.    6. Microalbuminuria due to type 2 diabetes mellitus   follow up with Dr. De Anda.    Patient readiness: acceptance and barriers:none    During the course of the visit the patient was educated and counseled about the following:     Diabetes:  HgA1c,lipid panel, CMP  Hypertension: continue with current medications.  Obesity:   General weight loss/lifestyle modification strategies discussed (elicit support from others; identify saboteurs; non-food rewards, etc).  Diet interventions: moderate (500 kCal/d) deficit diet.  Informal exercise measures discussed, e.g. taking stairs instead of elevator.  Regular aerobic exercise program discussed.    Goals: Diabetes: Maintain Hemoglobin A1C below 7, Hypertension: Reduce Blood Pressure and Obesity: Reduce calorie intake and BMI    Did patient meet goals/outcomes: No    The following self management tools provided: declined    Patient Instructions (the written plan) was given to the patient/family.     Time spent with patient: 30 minutes      FOLLOW UP: 6  months      Adelaida Mckoy MD

## 2018-05-29 ENCOUNTER — LAB VISIT (OUTPATIENT)
Dept: LAB | Facility: HOSPITAL | Age: 68
End: 2018-05-29
Attending: FAMILY MEDICINE
Payer: MEDICARE

## 2018-05-29 DIAGNOSIS — E11.29 TYPE 2 DIABETES MELLITUS WITH MICROALBUMINURIA, WITHOUT LONG-TERM CURRENT USE OF INSULIN: ICD-10-CM

## 2018-05-29 DIAGNOSIS — E11.69 HYPERLIPIDEMIA ASSOCIATED WITH TYPE 2 DIABETES MELLITUS: ICD-10-CM

## 2018-05-29 DIAGNOSIS — R80.9 TYPE 2 DIABETES MELLITUS WITH MICROALBUMINURIA, WITHOUT LONG-TERM CURRENT USE OF INSULIN: ICD-10-CM

## 2018-05-29 DIAGNOSIS — E78.5 HYPERLIPIDEMIA ASSOCIATED WITH TYPE 2 DIABETES MELLITUS: ICD-10-CM

## 2018-05-29 LAB
ALBUMIN SERPL BCP-MCNC: 3.8 G/DL
ALP SERPL-CCNC: 92 U/L
ALT SERPL W/O P-5'-P-CCNC: 20 U/L
ANION GAP SERPL CALC-SCNC: 10 MMOL/L
AST SERPL-CCNC: 16 U/L
BILIRUB SERPL-MCNC: 0.6 MG/DL
BUN SERPL-MCNC: 35 MG/DL
CALCIUM SERPL-MCNC: 9.4 MG/DL
CHLORIDE SERPL-SCNC: 111 MMOL/L
CHOLEST SERPL-MCNC: 177 MG/DL
CHOLEST/HDLC SERPL: 5.7 {RATIO}
CO2 SERPL-SCNC: 21 MMOL/L
CREAT SERPL-MCNC: 1.3 MG/DL
EST. GFR  (AFRICAN AMERICAN): >60 ML/MIN/1.73 M^2
EST. GFR  (NON AFRICAN AMERICAN): 55.3 ML/MIN/1.73 M^2
ESTIMATED AVG GLUCOSE: 157 MG/DL
GLUCOSE SERPL-MCNC: 154 MG/DL
HBA1C MFR BLD HPLC: 7.1 %
HDLC SERPL-MCNC: 31 MG/DL
HDLC SERPL: 17.5 %
LDLC SERPL CALC-MCNC: 102.4 MG/DL
NONHDLC SERPL-MCNC: 146 MG/DL
POTASSIUM SERPL-SCNC: 4.4 MMOL/L
PROT SERPL-MCNC: 7.2 G/DL
SODIUM SERPL-SCNC: 142 MMOL/L
TRIGL SERPL-MCNC: 218 MG/DL

## 2018-05-29 PROCEDURE — 80053 COMPREHEN METABOLIC PANEL: CPT

## 2018-05-29 PROCEDURE — 80061 LIPID PANEL: CPT

## 2018-05-29 PROCEDURE — 36415 COLL VENOUS BLD VENIPUNCTURE: CPT | Mod: PO

## 2018-05-29 PROCEDURE — 83036 HEMOGLOBIN GLYCOSYLATED A1C: CPT

## 2018-06-12 ENCOUNTER — OFFICE VISIT (OUTPATIENT)
Dept: OPTOMETRY | Facility: CLINIC | Age: 68
End: 2018-06-12
Payer: MEDICARE

## 2018-06-12 DIAGNOSIS — E11.9 DIABETES MELLITUS WITHOUT OPHTHALMIC MANIFESTATIONS: ICD-10-CM

## 2018-06-12 DIAGNOSIS — H33.002 RHEGMATOGENOUS RETINAL DETACHMENT OF LEFT EYE: Primary | ICD-10-CM

## 2018-06-12 DIAGNOSIS — Z96.1 PSEUDOPHAKIA: ICD-10-CM

## 2018-06-12 DIAGNOSIS — H52.7 REFRACTIVE ERROR: ICD-10-CM

## 2018-06-12 PROCEDURE — 99999 PR PBB SHADOW E&M-EST. PATIENT-LVL II: CPT | Mod: PBBFAC,,, | Performed by: OPTOMETRIST

## 2018-06-12 PROCEDURE — 92014 COMPRE OPH EXAM EST PT 1/>: CPT | Mod: S$GLB,,, | Performed by: OPTOMETRIST

## 2018-06-12 NOTE — PROGRESS NOTES
HPI     Presenting Complaint: Pt here today for yearly diabetic eye exam.    Hemoglobin A1C       Date                     Value               Ref Range             Status                05/29/2018               7.1 (H)             4.0 - 5.6 %           Final                 12/29/2017               6.9 (H)             4.0 - 5.6 %           Final                  12/01/2017               7.2 (H)             4.0 - 5.6 %           Final              Pt states vision in left eye is blurry over the last month suddenly. Has   not improved.     Ophthalmic medication / drops: None    (-) headaches  (-) diplopia   (-) flashes / (+) hx of floaters      Last edited by Bill Concepcion, OD on 6/12/2018  3:11 PM. (History)            Assessment /Plan     For exam results, see Encounter Report.    Rhegmatogenous retinal detachment of left eye  -     Ambulatory referral to Ophthalmology    Diabetes mellitus without ophthalmic manifestations    Pseudophakia    Refractive error      RRD OS with mac off x 1 month. Discussed findings and treatment options. Referred to Dr. Gibson for eval and treatment.     DM type 2 w/o ocular retinopathy OU. Discussed possible ocular affects of uncontrolled blood sugar with patient. Recommended continued strong blood sugar control and continued care with PCP. Monitor yearly.     S/p cataract extraction with good results. No refraction today. Return as needed for updated spec Rx.      RTC as scheduled for RRD OS eval with Dr. Gibson, or sooner prn.

## 2018-06-18 ENCOUNTER — TELEPHONE (OUTPATIENT)
Dept: OPHTHALMOLOGY | Facility: CLINIC | Age: 68
End: 2018-06-18

## 2018-06-18 ENCOUNTER — INITIAL CONSULT (OUTPATIENT)
Dept: OPHTHALMOLOGY | Facility: CLINIC | Age: 68
End: 2018-06-18
Payer: MEDICARE

## 2018-06-18 DIAGNOSIS — H33.022 RETINAL DETACHMENT OF LEFT EYE WITH MULTIPLE BREAKS: Primary | ICD-10-CM

## 2018-06-18 DIAGNOSIS — H33.022 RETINAL DETACHMENT OF LEFT EYE WITH MULTIPLE BREAKS: ICD-10-CM

## 2018-06-18 DIAGNOSIS — H43.813 POSTERIOR VITREOUS DETACHMENT, BILATERAL: ICD-10-CM

## 2018-06-18 PROCEDURE — 92014 COMPRE OPH EXAM EST PT 1/>: CPT | Mod: S$GLB,,, | Performed by: OPHTHALMOLOGY

## 2018-06-18 PROCEDURE — 99999 PR PBB SHADOW E&M-EST. PATIENT-LVL II: CPT | Mod: PBBFAC,,, | Performed by: OPHTHALMOLOGY

## 2018-06-18 PROCEDURE — 92225 PR SPECIAL EYE EXAM, INITIAL: CPT | Mod: LT,S$GLB,, | Performed by: OPHTHALMOLOGY

## 2018-06-18 RX ORDER — ATORVASTATIN CALCIUM 80 MG/1
TABLET, FILM COATED ORAL
Qty: 90 TABLET | Refills: 3 | Status: SHIPPED | OUTPATIENT
Start: 2018-06-18 | End: 2018-06-18 | Stop reason: SDUPTHER

## 2018-06-18 RX ORDER — IBUPROFEN 100 MG/5ML
1000 SUSPENSION, ORAL (FINAL DOSE FORM) ORAL DAILY
Status: ON HOLD | COMMUNITY
End: 2018-08-15 | Stop reason: HOSPADM

## 2018-06-18 NOTE — PROGRESS NOTES
HPI     Eye Problem    Additional comments: RD           Comments   Patient here for evaluation of mac off RD OS. Around May 10th/11th vision   OS started becoming cloudy. When looking at a box it looks like it has a   bend in it       Last edited by Erika Weathers on 6/18/2018  3:08 PM. (History)        A/P    1. RRD OS with multiple breaks    PLan 25g PPV/AFx/EL/C3F8 OS for RRD    Local MAC  LOC 45 min    Risks, benefits, and alternatives to treatment discussed in detail with the patient.  The patient voiced understanding and wished to proceed with the procedure    2. PCIOL OU    3. PVD OD    4. DM  No DR  BS/BP/chol control    TO OR

## 2018-06-18 NOTE — LETTER
June 18, 2018      Bill Concepcion [address]  [facility] [address]           [facility]  [address] [facility] [address] [place] [address]  Phone: [phone]  Fax: [phone]          Patient: Myron Noel   MR Number: [id number]   YOB: 1950   Date of Visit: 6/18/2018       Dear Dr. Bill Concepcion:    Thank you for referring Myron Noel to me for evaluation. Attached you will find relevant portions of my assessment and plan of care.    If you have questions, please do not hesitate to call me. I look forward to following Myron Noel along with you.    Sincerely,    IRA Gibson MD    Enclosure  CC:  No Recipients    If you would like to receive this communication electronically, please contact [email or web address] or [phone] to request more information on [facility] access.    For providers and/or their staff who would like to refer a patient to [facility], please contact us through our one-stop-shop provider referral line, [facility] , at [phone].    If you feel you have received this communication in error or would no longer like to receive these types of communications, please e-mail [email or web address]

## 2018-06-19 ENCOUNTER — TELEPHONE (OUTPATIENT)
Dept: OPHTHALMOLOGY | Facility: CLINIC | Age: 68
End: 2018-06-19

## 2018-06-20 ENCOUNTER — HOSPITAL ENCOUNTER (OUTPATIENT)
Facility: HOSPITAL | Age: 68
Discharge: HOME | End: 2018-06-20
Attending: OPHTHALMOLOGY | Admitting: OPHTHALMOLOGY
Payer: MEDICARE

## 2018-06-20 ENCOUNTER — ANESTHESIA EVENT (OUTPATIENT)
Dept: SURGERY | Facility: HOSPITAL | Age: 68
End: 2018-06-20
Payer: MEDICARE

## 2018-06-20 ENCOUNTER — ANESTHESIA (OUTPATIENT)
Dept: SURGERY | Facility: HOSPITAL | Age: 68
End: 2018-06-20
Payer: MEDICARE

## 2018-06-20 VITALS
OXYGEN SATURATION: 96 % | DIASTOLIC BLOOD PRESSURE: 69 MMHG | WEIGHT: 284 LBS | BODY MASS INDEX: 42.06 KG/M2 | SYSTOLIC BLOOD PRESSURE: 149 MMHG | HEART RATE: 67 BPM | TEMPERATURE: 99 F | RESPIRATION RATE: 18 BRPM | HEIGHT: 69 IN

## 2018-06-20 DIAGNOSIS — H33.022 RETINAL DETACHMENT OF LEFT EYE WITH MULTIPLE BREAKS: Primary | ICD-10-CM

## 2018-06-20 DIAGNOSIS — H33.002 RHEGMATOGENOUS RETINAL DETACHMENT OF LEFT EYE: ICD-10-CM

## 2018-06-20 DIAGNOSIS — H35.22 PROLIFERATIVE VITREORETINOPATHY, LEFT: ICD-10-CM

## 2018-06-20 LAB
POCT GLUCOSE: 109 MG/DL (ref 70–110)
POCT GLUCOSE: 91 MG/DL (ref 70–110)

## 2018-06-20 PROCEDURE — 67113 REPAIR RETINAL DETACH CPLX: CPT | Mod: LT,,, | Performed by: OPHTHALMOLOGY

## 2018-06-20 PROCEDURE — C1784 OCULAR DEV, INTRAOP, DET RET: HCPCS | Performed by: OPHTHALMOLOGY

## 2018-06-20 PROCEDURE — 71000015 HC POSTOP RECOV 1ST HR: Performed by: OPHTHALMOLOGY

## 2018-06-20 PROCEDURE — 27201423 OPTIME MED/SURG SUP & DEVICES STERILE SUPPLY: Performed by: OPHTHALMOLOGY

## 2018-06-20 PROCEDURE — D9220A PRA ANESTHESIA: Mod: ANES,,, | Performed by: ANESTHESIOLOGY

## 2018-06-20 PROCEDURE — 25000003 PHARM REV CODE 250: Performed by: ANESTHESIOLOGY

## 2018-06-20 PROCEDURE — 37000009 HC ANESTHESIA EA ADD 15 MINS: Performed by: OPHTHALMOLOGY

## 2018-06-20 PROCEDURE — 63600175 PHARM REV CODE 636 W HCPCS: Performed by: OPHTHALMOLOGY

## 2018-06-20 PROCEDURE — 82962 GLUCOSE BLOOD TEST: CPT | Performed by: OPHTHALMOLOGY

## 2018-06-20 PROCEDURE — 63600175 PHARM REV CODE 636 W HCPCS: Performed by: NURSE ANESTHETIST, CERTIFIED REGISTERED

## 2018-06-20 PROCEDURE — S0020 INJECTION, BUPIVICAINE HYDRO: HCPCS | Performed by: OPHTHALMOLOGY

## 2018-06-20 PROCEDURE — 82962 GLUCOSE BLOOD TEST: CPT | Mod: 91 | Performed by: OPHTHALMOLOGY

## 2018-06-20 PROCEDURE — 36000706: Performed by: OPHTHALMOLOGY

## 2018-06-20 PROCEDURE — D9220A PRA ANESTHESIA: Mod: CRNA,,, | Performed by: NURSE ANESTHETIST, CERTIFIED REGISTERED

## 2018-06-20 PROCEDURE — 71000033 HC RECOVERY, INTIAL HOUR: Performed by: OPHTHALMOLOGY

## 2018-06-20 PROCEDURE — 37000008 HC ANESTHESIA 1ST 15 MINUTES: Performed by: OPHTHALMOLOGY

## 2018-06-20 PROCEDURE — 27600004 OPTIME MED/SURG SUP & DEVICES INTRAOCULAR LENS: Performed by: OPHTHALMOLOGY

## 2018-06-20 PROCEDURE — 25000003 PHARM REV CODE 250: Performed by: OPHTHALMOLOGY

## 2018-06-20 PROCEDURE — 99499 UNLISTED E&M SERVICE: CPT | Mod: ,,, | Performed by: OPHTHALMOLOGY

## 2018-06-20 PROCEDURE — 36000707: Performed by: OPHTHALMOLOGY

## 2018-06-20 RX ORDER — LIDOCAINE HYDROCHLORIDE 20 MG/ML
INJECTION, SOLUTION INFILTRATION; PERINEURAL
Status: DISCONTINUED
Start: 2018-06-20 | End: 2018-06-20 | Stop reason: HOSPADM

## 2018-06-20 RX ORDER — CYCLOPENTOLATE HYDROCHLORIDE 10 MG/ML
1 SOLUTION/ DROPS OPHTHALMIC
Status: DISCONTINUED | OUTPATIENT
Start: 2018-06-20 | End: 2018-06-20 | Stop reason: HOSPADM

## 2018-06-20 RX ORDER — VANCOMYCIN HYDROCHLORIDE 500 MG/10ML
INJECTION, POWDER, LYOPHILIZED, FOR SOLUTION INTRAVENOUS
Status: DISCONTINUED
Start: 2018-06-20 | End: 2018-06-20 | Stop reason: HOSPADM

## 2018-06-20 RX ORDER — EPINEPHRINE 1 MG/ML
INJECTION, SOLUTION INTRACARDIAC; INTRAMUSCULAR; INTRAVENOUS; SUBCUTANEOUS
Status: DISCONTINUED
Start: 2018-06-20 | End: 2018-06-20 | Stop reason: HOSPADM

## 2018-06-20 RX ORDER — NEOMYCIN SULFATE, POLYMYXIN B SULFATE, AND DEXAMETHASONE 3.5; 10000; 1 MG/G; [USP'U]/G; MG/G
OINTMENT OPHTHALMIC
Status: DISCONTINUED | OUTPATIENT
Start: 2018-06-20 | End: 2018-06-20 | Stop reason: HOSPADM

## 2018-06-20 RX ORDER — PHENYLEPHRINE HYDROCHLORIDE 25 MG/ML
1 SOLUTION/ DROPS OPHTHALMIC
Status: DISCONTINUED | OUTPATIENT
Start: 2018-06-20 | End: 2018-06-20 | Stop reason: HOSPADM

## 2018-06-20 RX ORDER — ACETAMINOPHEN 325 MG/1
650 TABLET ORAL EVERY 4 HOURS PRN
Status: DISCONTINUED | OUTPATIENT
Start: 2018-06-20 | End: 2018-06-20 | Stop reason: HOSPADM

## 2018-06-20 RX ORDER — MOXIFLOXACIN 5 MG/ML
1 SOLUTION/ DROPS OPHTHALMIC
Status: DISCONTINUED | OUTPATIENT
Start: 2018-06-20 | End: 2018-06-20 | Stop reason: HOSPADM

## 2018-06-20 RX ORDER — HYDROCODONE BITARTRATE AND ACETAMINOPHEN 5; 325 MG/1; MG/1
1 TABLET ORAL EVERY 4 HOURS PRN
Status: DISCONTINUED | OUTPATIENT
Start: 2018-06-20 | End: 2018-06-20 | Stop reason: HOSPADM

## 2018-06-20 RX ORDER — OXYCODONE AND ACETAMINOPHEN 5; 325 MG/1; MG/1
1 TABLET ORAL EVERY 6 HOURS PRN
Qty: 12 TABLET | Refills: 0 | Status: SHIPPED | OUTPATIENT
Start: 2018-06-20 | End: 2018-07-02

## 2018-06-20 RX ORDER — ONDANSETRON 4 MG/1
4 TABLET, FILM COATED ORAL EVERY 8 HOURS PRN
Qty: 12 TABLET | Refills: 0 | Status: SHIPPED | OUTPATIENT
Start: 2018-06-20 | End: 2018-07-02

## 2018-06-20 RX ORDER — BUPIVACAINE HYDROCHLORIDE 7.5 MG/ML
INJECTION, SOLUTION EPIDURAL; RETROBULBAR
Status: DISCONTINUED | OUTPATIENT
Start: 2018-06-20 | End: 2018-06-20 | Stop reason: HOSPADM

## 2018-06-20 RX ORDER — NEOMYCIN SULFATE, POLYMYXIN B SULFATE, AND DEXAMETHASONE 3.5; 10000; 1 MG/G; [USP'U]/G; MG/G
OINTMENT OPHTHALMIC
Status: DISCONTINUED
Start: 2018-06-20 | End: 2018-06-20 | Stop reason: HOSPADM

## 2018-06-20 RX ORDER — DEXAMETHASONE SODIUM PHOSPHATE 4 MG/ML
INJECTION, SOLUTION INTRA-ARTICULAR; INTRALESIONAL; INTRAMUSCULAR; INTRAVENOUS; SOFT TISSUE
Status: DISCONTINUED
Start: 2018-06-20 | End: 2018-06-20 | Stop reason: HOSPADM

## 2018-06-20 RX ORDER — TROPICAMIDE 10 MG/ML
1 SOLUTION/ DROPS OPHTHALMIC
Status: DISCONTINUED | OUTPATIENT
Start: 2018-06-20 | End: 2018-06-20 | Stop reason: HOSPADM

## 2018-06-20 RX ORDER — PROPOFOL 10 MG/ML
VIAL (ML) INTRAVENOUS
Status: DISCONTINUED | OUTPATIENT
Start: 2018-06-20 | End: 2018-06-20

## 2018-06-20 RX ORDER — PREDNISOLONE ACETATE 10 MG/ML
1 SUSPENSION/ DROPS OPHTHALMIC
Status: DISCONTINUED | OUTPATIENT
Start: 2018-06-20 | End: 2018-06-20 | Stop reason: HOSPADM

## 2018-06-20 RX ORDER — BUPIVACAINE HYDROCHLORIDE 7.5 MG/ML
INJECTION, SOLUTION EPIDURAL; RETROBULBAR
Status: DISCONTINUED
Start: 2018-06-20 | End: 2018-06-20 | Stop reason: HOSPADM

## 2018-06-20 RX ORDER — DEXAMETHASONE SODIUM PHOSPHATE 4 MG/ML
INJECTION, SOLUTION INTRA-ARTICULAR; INTRALESIONAL; INTRAMUSCULAR; INTRAVENOUS; SOFT TISSUE
Status: DISCONTINUED | OUTPATIENT
Start: 2018-06-20 | End: 2018-06-20 | Stop reason: HOSPADM

## 2018-06-20 RX ORDER — LIDOCAINE HYDROCHLORIDE 10 MG/ML
1 INJECTION, SOLUTION EPIDURAL; INFILTRATION; INTRACAUDAL; PERINEURAL ONCE
Status: COMPLETED | OUTPATIENT
Start: 2018-06-20 | End: 2018-06-20

## 2018-06-20 RX ORDER — LIDOCAINE HCL/PF 100 MG/5ML
SYRINGE (ML) INTRAVENOUS
Status: DISCONTINUED | OUTPATIENT
Start: 2018-06-20 | End: 2018-06-20

## 2018-06-20 RX ORDER — EPINEPHRINE 1 MG/ML
INJECTION, SOLUTION INTRACARDIAC; INTRAMUSCULAR; INTRAVENOUS; SUBCUTANEOUS
Status: DISCONTINUED | OUTPATIENT
Start: 2018-06-20 | End: 2018-06-20 | Stop reason: HOSPADM

## 2018-06-20 RX ORDER — TETRACAINE HYDROCHLORIDE 5 MG/ML
1 SOLUTION OPHTHALMIC
Status: DISCONTINUED | OUTPATIENT
Start: 2018-06-20 | End: 2018-06-20 | Stop reason: HOSPADM

## 2018-06-20 RX ORDER — LIDOCAINE HYDROCHLORIDE 20 MG/ML
INJECTION, SOLUTION EPIDURAL; INFILTRATION; INTRACAUDAL; PERINEURAL
Status: DISCONTINUED | OUTPATIENT
Start: 2018-06-20 | End: 2018-06-20 | Stop reason: HOSPADM

## 2018-06-20 RX ORDER — SODIUM CHLORIDE 9 MG/ML
INJECTION, SOLUTION INTRAVENOUS CONTINUOUS
Status: DISCONTINUED | OUTPATIENT
Start: 2018-06-20 | End: 2018-06-20 | Stop reason: HOSPADM

## 2018-06-20 RX ORDER — ONDANSETRON 8 MG/1
8 TABLET, ORALLY DISINTEGRATING ORAL EVERY 8 HOURS PRN
Status: DISCONTINUED | OUTPATIENT
Start: 2018-06-20 | End: 2018-06-20 | Stop reason: HOSPADM

## 2018-06-20 RX ORDER — SODIUM CHLORIDE 0.9 % (FLUSH) 0.9 %
3 SYRINGE (ML) INJECTION
Status: DISCONTINUED | OUTPATIENT
Start: 2018-06-20 | End: 2018-06-20 | Stop reason: HOSPADM

## 2018-06-20 RX ORDER — MIDAZOLAM HYDROCHLORIDE 1 MG/ML
INJECTION, SOLUTION INTRAMUSCULAR; INTRAVENOUS
Status: DISCONTINUED | OUTPATIENT
Start: 2018-06-20 | End: 2018-06-20

## 2018-06-20 RX ORDER — VANCOMYCIN HYDROCHLORIDE 500 MG/10ML
INJECTION, POWDER, LYOPHILIZED, FOR SOLUTION INTRAVENOUS
Status: DISCONTINUED | OUTPATIENT
Start: 2018-06-20 | End: 2018-06-20 | Stop reason: HOSPADM

## 2018-06-20 RX ORDER — LIDOCAINE HYDROCHLORIDE 20 MG/ML
INJECTION, SOLUTION EPIDURAL; INFILTRATION; INTRACAUDAL; PERINEURAL
Status: DISCONTINUED
Start: 2018-06-20 | End: 2018-06-20 | Stop reason: HOSPADM

## 2018-06-20 RX ORDER — FENTANYL CITRATE 50 UG/ML
INJECTION, SOLUTION INTRAMUSCULAR; INTRAVENOUS
Status: DISCONTINUED | OUTPATIENT
Start: 2018-06-20 | End: 2018-06-20

## 2018-06-20 RX ADMIN — LIDOCAINE HYDROCHLORIDE 50 MG: 20 INJECTION, SOLUTION INTRAVENOUS at 01:06

## 2018-06-20 RX ADMIN — PHENYLEPHRINE HYDROCHLORIDE 1 DROP: 25 SOLUTION/ DROPS OPHTHALMIC at 11:06

## 2018-06-20 RX ADMIN — FENTANYL CITRATE 25 MCG: 50 INJECTION, SOLUTION INTRAMUSCULAR; INTRAVENOUS at 01:06

## 2018-06-20 RX ADMIN — PHENYLEPHRINE HYDROCHLORIDE 1 DROP: 25 SOLUTION/ DROPS OPHTHALMIC at 12:06

## 2018-06-20 RX ADMIN — HOMATROPINE HYDROBROMIDE 1 DROP: 50 SOLUTION OPHTHALMIC at 12:06

## 2018-06-20 RX ADMIN — TROPICAMIDE 1 DROP: 10 SOLUTION/ DROPS OPHTHALMIC at 12:06

## 2018-06-20 RX ADMIN — TETRACAINE HYDROCHLORIDE 1 DROP: 5 SOLUTION OPHTHALMIC at 11:06

## 2018-06-20 RX ADMIN — SODIUM CHLORIDE: 0.9 INJECTION, SOLUTION INTRAVENOUS at 11:06

## 2018-06-20 RX ADMIN — CYCLOPENTOLATE HYDROCHLORIDE 1 DROP: 10 SOLUTION/ DROPS OPHTHALMIC at 12:06

## 2018-06-20 RX ADMIN — MIDAZOLAM HYDROCHLORIDE 1 MG: 1 INJECTION, SOLUTION INTRAMUSCULAR; INTRAVENOUS at 01:06

## 2018-06-20 RX ADMIN — FENTANYL CITRATE 25 MCG: 50 INJECTION, SOLUTION INTRAMUSCULAR; INTRAVENOUS at 02:06

## 2018-06-20 RX ADMIN — LIDOCAINE HYDROCHLORIDE 10 MG: 10 INJECTION, SOLUTION EPIDURAL; INFILTRATION; INTRACAUDAL; PERINEURAL at 11:06

## 2018-06-20 RX ADMIN — MOXIFLOXACIN HYDROCHLORIDE 1 DROP: 5 SOLUTION/ DROPS OPHTHALMIC at 11:06

## 2018-06-20 RX ADMIN — PROPOFOL 100 MG: 10 INJECTION, EMULSION INTRAVENOUS at 01:06

## 2018-06-20 RX ADMIN — MOXIFLOXACIN HYDROCHLORIDE 1 DROP: 5 SOLUTION/ DROPS OPHTHALMIC at 12:06

## 2018-06-20 RX ADMIN — CYCLOPENTOLATE HYDROCHLORIDE 1 DROP: 10 SOLUTION/ DROPS OPHTHALMIC at 11:06

## 2018-06-20 RX ADMIN — PREDNISOLONE ACETATE 1 DROP: 10 SUSPENSION/ DROPS OPHTHALMIC at 12:06

## 2018-06-20 RX ADMIN — HOMATROPINE HYDROBROMIDE 1 DROP: 50 SOLUTION OPHTHALMIC at 11:06

## 2018-06-20 RX ADMIN — TROPICAMIDE 1 DROP: 10 SOLUTION/ DROPS OPHTHALMIC at 11:06

## 2018-06-20 RX ADMIN — PREDNISOLONE ACETATE 1 DROP: 10 SUSPENSION/ DROPS OPHTHALMIC at 11:06

## 2018-06-20 NOTE — BRIEF OP NOTE
Pre-Op Dx: RRD with multiple breaks and PVR OS    Post Op Dx: same    Procedure Performed: 25g PPV/membrane stripping/AFx/EL/C3F8 14% OS  EL #1034, P 150-200mW, D 0.1s    Attending Surgeon: Arthur    Assistant Surgeon: Oliva    Anesthesia: Local/Mac, retrobulbar injection of 4.0cc mixture 0.75%Marcaine, 2% Xylocaine    Estimated blood loss: Minimal    Complication: None    Specimen: None    Disposition: Stable to recovery    Findings/Outcome: Near total RD with 2 small breaks with PVR at 7:00 and 8:30 OS    Date of Discharge: 6/20/18    Discharge Disposition: stable to recovery then home    F/U: tomorrow

## 2018-06-20 NOTE — ANESTHESIA POSTPROCEDURE EVALUATION
"Anesthesia Post Evaluation    Patient: Myron Noel    Procedure(s) Performed: Procedure(s) (LRB):  REPAIR, RETINAL DETACHMENT, WITH VITRECTOMY (Left)    Final Anesthesia Type: MAC  Patient location during evaluation: [facility]  Patient participation: Yes- Able to Participate  Level of consciousness: awake and alert and oriented  Post-procedure vital signs: reviewed and stable  Pain management: adequate  Airway patency: patent  PONV status at discharge: No PONV  Anesthetic complications: no      Cardiovascular status: blood pressure returned to baseline  Respiratory status: unassisted and spontaneous ventilation  Hydration status: euvolemic  Follow-up not needed.        Visit Vitals  BP (!) 149/69 (BP Location: Left arm, Patient Position: Lying)   Pulse 67   Temp 37.2 °C (99 °F) (Temporal)   Resp 18   Ht 5' 9" (1.753 m)   Wt 128.8 kg (284 lb)   SpO2 96%   BMI 41.94 kg/m²       Pain/Yousif Score: Pain Assessment Performed: Yes (6/20/2018  3:16 PM)  Presence of Pain: denies (6/20/2018  3:16 PM)  Yousif Score: 10 (6/20/2018  3:16 PM)      "

## 2018-06-20 NOTE — DISCHARGE INSTRUCTIONS
Post Op Instructions:  Patient should Maintain Eye shield & Dressing until seen tomorrow in eye clinic  Tylenol as needed for general discomfort  Use Prescription for pain medication if pain is severe  Use Prescription for Nausea (Zofran) if nausea or vomiting  No excessive exercise   No Bending, Lifting or Straining  Call MD if significant pain or nausea / vomiting uncontrolled by medications  Call MD if temperature in excess of 101' F  Maintain Head in a Face-Down Position or left side down position  Return to eye clinic for Post Op Examination tomorrow Morning.  Bring Medicine bag to tomorrow's appointment.

## 2018-06-20 NOTE — ANESTHESIA PREPROCEDURE EVALUATION
Myron Noel is a 68 y.o., male.    [facility] Anesthesia Evaluation    I have reviewed the Patient Summary Reports.    I have reviewed the Nursing Notes.      Review of Systems  Anesthesia Hx:  No problems with previous Anesthesia    Social:  Non-Smoker    Hematology/Oncology:     Oncology Normal     Cardiovascular:   Hypertension  Denies Angina.         CONCLUSIONS   1 - Concentric hypertrophy.   2 - Normal left ventricular systolic function (EF 55-60%).   3 - Left ventricular diastolic dysfunction.   4 - Normal right ventricular systolic function .   5 - Trivial mitral regurgitation.   6 - Trivial to mild tricuspid regurgitation.    Pulmonary:   Pneumonia Denies COPD.  Denies Asthma.    Renal/:   Chronic Renal Disease    Musculoskeletal:   Arthritis     Neurological:  Neurology Normal    Endocrine:   Diabetes, type 2    Dermatological:  Skin Normal    Psych:  Psychiatric Normal           Physical Exam  General:  Obesity    Airway/Jaw/Neck:  Airway Findings: Mouth Opening: Normal Tongue: Normal  Pre-Existing Airway Tube(s): Oral Endotracheal tube  General Airway Assessment: Adult  Mallampati: III  Improves to II with phonation.  TM Distance: 4 - 6 cm       Chest/Lungs:  Chest/Lungs Findings: Clear to auscultation, Normal Respiratory Rate     Heart/Vascular:  Heart Findings: Rate: Normal  Rhythm: Regular Rhythm                 Ready For Surgery From Anesthesia Perspective.                                                                                                                  06/20/2018  Myron Noel is a 68 y.o., male.    Anesthesia Evaluation    I have reviewed the Patient Summary Reports.     I have reviewed the Medications.     Review of Systems         Anesthesia Plan  Type of Anesthesia, risks & benefits discussed:  Anesthesia Type:  MAC  Patient's Preference:   Intra-op Monitoring Plan: standard ASA  monitors  Intra-op Monitoring Plan Comments:   Post Op Pain Control Plan: per primary service following discharge from PACU  Post Op Pain Control Plan Comments:   Induction:    Beta Blocker:  Patient is not currently on a Beta-Blocker (No further documentation required).       Informed Consent: Patient understands risks and agrees with Anesthesia plan.  Questions answered. Anesthesia consent signed with patient.  ASA Score: 3     Day of Surgery Review of History & Physical:            Ready For Surgery From Anesthesia Perspective.

## 2018-06-20 NOTE — OP NOTE
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    PROCEDURE PERFORMED:  A 25-gauge pars plana vitrectomy with membrane stripping,   air-fluid exchange, endolaser injection of C3F8 gas 14% to the left eye.    ENDOLASER PARAMETERS:  Number of spots 1034, power 150 to 200 milliwatts,   duration 0.1 seconds.    ATTENDING SURGEON:  IRA Gibson M.D.    ASSISTANT SURGEON:  Fellow, Vdaim Baptiste.    ANESTHESIA:  Local monitored anesthesia care with a retrobulbar injection of 4.0   mL mixture of 0.1% Marcaine and 2% Xylocaine.    ESTIMATED BLOOD LOSS:  Minimal.    COMPLICATIONS:  None.    DISPOSITION:  Stable to Recovery.    INDICATIONS FOR SURGERY:  This is a 68-year-old male with a history of blurred   vision out of his left eye over the last four to five weeks.  The patient was   sent to me by Dr. Concepcion for evaluation and management.  The patient was found to   have a near total retinal detachment with 2 small breaks in the surrounding PVR   at the 7 o'clock and 8:30 o'clock position.  Decision was made to take the   patient to surgery to repair the retinal detachment, prevent further vision loss   and hopefully improve some vision.  Risks, benefits, and alternatives of   surgery were discussed in detail.  Risks including loss of vision, loss of eye,   recurrent retinal detachment, infection, hemorrhage, lens dislocation, glaucoma,   hypotony, ptosis and diplopia.  The patient voiced understanding and wished to   proceed with the procedure.    DESCRIPTION OF PROCEDURE:  After proper informed consent was obtained, the   patient was brought back to the Operating Suite at [facility] where   MAC anesthesia was induced.  Retrobulbar injection was  provided as above without   complication.  The patient was prepped and draped in normal sterile fashion for   ophthalmic surgery and lid speculum was placed in the left eye.  A standard   3-port 25-gauge pars plana vitrectomy was set up with the infusion cannula   inserted 3.5 mm posterior to the limbus.  The infusion cannula was turned on   only after observed to be free and clear of all underlying retinal tissue.    Supranasal and supratemporal trocars were also placed 3.5 mm posterior to the   limbus.  The vitrector and light pipe were introduced in the vitreous cavity and   a core vitrectomy was performed.  The posterior hyaloid was already detached,   but it was propagated out to the level of the vitreous base.  Scleral depression   was used 360 degrees to help with removal of the cortical vitreous.  There were   2 small PVR foci at the 7 o'clock and 8:30 o'clock positions with presumed   retinal breaks beneath them; however, we cannot be sure there were not other   smaller breaks.  An air-fluid exchange was performed following a nasal posterior   drainage retinotomy.  The retina flattened nicely following this maneuver.    Endolaser was applied in a barrier fashion around the posterior retinotomy as   well as the inferior 200 degrees, especially with areas around the PVR foci.    Supranasal trocars were removed and not leaking after gentle massage.  A 14%   C3F8 gas mixture was created and approximately 40 mL were cycled through the   eye, then the supratemporal and infusion trocars were removed not leaking after   gentle massage and the eye was normal pressure via palpation.  Subconjunctival   injections of vancomycin and Decadron were given to the patient.  The drapes   were removed from the patient.  He was washed free of Betadine prep solution.    Maxitrol ointment was placed in the left eye.  The eye was patch shielded.  MAC   anesthesia was reversed and he was brought to Recovery Room in stable  condition,   tolerating the procedure well.  Dr. Gibson was present for entire case.      XIOMARA/LUIS  dd: 06/20/2018 14:27:51 (CDT)  td: 06/20/2018 14:54:28 (CDT)  Doc ID   #[id number]  Job ID #[id number]    CC:

## 2018-06-20 NOTE — TRANSFER OF CARE
"Anesthesia Transfer of Care Note    Patient: Myron Noel    Procedure(s) Performed: Procedure(s) (LRB):  REPAIR, RETINAL DETACHMENT, WITH VITRECTOMY (Left)    Patient location: PACU    Anesthesia Type: MAC    Transport from OR: Transported from OR on room air with adequate spontaneous ventilation    Post pain: adequate analgesia    Post assessment: no apparent anesthetic complications and tolerated procedure well    Post vital signs: stable    Level of consciousness: awake, alert and oriented    Nausea/Vomiting: no nausea/vomiting    Complications: none    Transfer of care protocol was followed      Last vitals:   Visit Vitals  BP (!) 148/82 (BP Location: Left arm, Patient Position: Lying)   Pulse 72   Temp 37.5 °C (99.5 °F) (Temporal)   Resp 18   Ht 5' 9" (1.753 m)   Wt 128.8 kg (284 lb)   SpO2 100%   BMI 41.94 kg/m²     "

## 2018-06-20 NOTE — INTERVAL H&P NOTE
H&P completed on 06/19/18 has been reviewed, the patient has been examined and:  I concur with the findings and no changes have occurred since H&P was written.    Active Hospital Problems    Diagnosis  POA    Rhegmatogenous retinal detachment of left eye [H33.002]  Yes      Resolved Hospital Problems    Diagnosis Date Resolved POA   No resolved problems to display.

## 2018-06-20 NOTE — H&P
Pre-Operative History & Physical  Ophthalmology      SUBJECTIVE:     History of Present Illness:  Patient is a 68 y.o. male presents with Retinal detachment of left eye with multiple breaks [H33.022].    MEDICATIONS:   No prescriptions prior to admission.       ALLERGIES:   Review of patient's allergies indicates:   Allergen Reactions    Metformin      GI distress    Venom-honey bee Other (See Comments)     Passed out       PAST MEDICAL HISTORY:   Past Medical History:   Diagnosis Date    Cataract     ou done//    Chronic kidney disease 2001    nephrectomy for obstructive stones    Corneal abrasion, left     With leaf 40 yrs ago    Diabetes mellitus     Gout, chronic     Hypertension     Knee osteoarthritis 12/20/2013    Retinal detachment of left eye with multiple breaks 6/18/2018     PAST SURGICAL HISTORY:   Past Surgical History:   Procedure Laterality Date    APPENDECTOMY      CATARACT EXTRACTION  08/06/2014    od    CATARACT EXTRACTION W/  INTRAOCULAR LENS IMPLANT Left 11/5/14    EYE SURGERY      NEPHRECTOMY      TONSILLECTOMY       PAST FAMILY HISTORY:   Family History   Problem Relation Age of Onset    Heart defect Father     Alzheimer's disease Maternal Aunt     Diabetes Maternal Grandmother     Melanoma Neg Hx     Psoriasis Neg Hx     Lupus Neg Hx     Eczema Neg Hx      SOCIAL HISTORY:   Social History   Substance Use Topics    Smoking status: Former Smoker     Types: Pipe    Smokeless tobacco: Never Used    Alcohol use No        MENTAL STATUS: Alert    REVIEW OF SYSTEMS: Negative    OBJECTIVE:     Vital Signs (Most Recent)       Physical Exam:  General: NAD  HEENT: Atraumatic  Lungs: Adequate respirations, LCTAB  Heart: RRR, No murmur  Abdomen: Soft NT    ASSESSMENT/PLAN:     Patient is a 68 y.o. male with Retinal detachment of left eye with multiple breaks [H33.022].     - Plan for surgical correction PLan 25g PPV/AFx/EL/C3F8 OS for RRD     Local MAC  LOC 45 min   -  Risks/benefits/alternatives of the procedure including, but not limited to scarring, bleeding, infection, loss or decreased vision, and/or need for possible repeat surgery discussed with the patient and family.   - Informed consent obtained prior to surgery and the patient/family voiced good understanding.    Musa Baptiste  6/19/2018  9:43 PM

## 2018-06-20 NOTE — PLAN OF CARE
D/C instructions reviewed with patient and daughter. Questions answered. Handouts provided. Grey bag with eye drops and prescriptions given to patient by MD. Pt awake, alert, VSS, tolerating PO, denies pain or nausea. Criteria met for d/c home with daughter driving.

## 2018-06-21 ENCOUNTER — OFFICE VISIT (OUTPATIENT)
Dept: OPHTHALMOLOGY | Facility: CLINIC | Age: 68
End: 2018-06-21
Payer: MEDICARE

## 2018-06-21 VITALS — SYSTOLIC BLOOD PRESSURE: 148 MMHG | DIASTOLIC BLOOD PRESSURE: 78 MMHG | HEART RATE: 65 BPM

## 2018-06-21 DIAGNOSIS — H35.22 PROLIFERATIVE VITREORETINOPATHY, LEFT: ICD-10-CM

## 2018-06-21 DIAGNOSIS — H33.022 RETINAL DETACHMENT OF LEFT EYE WITH MULTIPLE BREAKS: Primary | ICD-10-CM

## 2018-06-21 PROCEDURE — 99024 POSTOP FOLLOW-UP VISIT: CPT | Mod: S$GLB,,, | Performed by: OPHTHALMOLOGY

## 2018-06-21 PROCEDURE — 99999 PR PBB SHADOW E&M-EST. PATIENT-LVL IV: CPT | Mod: PBBFAC,,, | Performed by: OPHTHALMOLOGY

## 2018-06-21 NOTE — PROGRESS NOTES
HPI     Post-op Evaluation    Additional comments: 1 day PO           Comments   S/p 25g PPV/AFx/EL/C3F8 OS for RRD      A/P    1. RRD OS with multiple breaks    s/p 25g PPV/AFx/EL/C3F8 OS for RRD 6/20/28      Doing well, IOP ok    Start gtts QID, ointment/shield QHS - next week, PF/HA BID  Left side down x 2 days, then sleep on left        2. PCIOL OU    3. PVD OD    4. DM  No DR  BS/BP/chol control      F/U 11 days

## 2018-07-02 ENCOUNTER — OFFICE VISIT (OUTPATIENT)
Dept: OPHTHALMOLOGY | Facility: CLINIC | Age: 68
End: 2018-07-02
Payer: MEDICARE

## 2018-07-02 DIAGNOSIS — H33.002 RHEGMATOGENOUS RETINAL DETACHMENT OF LEFT EYE: Primary | ICD-10-CM

## 2018-07-02 PROCEDURE — 99024 POSTOP FOLLOW-UP VISIT: CPT | Mod: S$GLB,,, | Performed by: OPHTHALMOLOGY

## 2018-07-02 PROCEDURE — 99999 PR PBB SHADOW E&M-EST. PATIENT-LVL III: CPT | Mod: PBBFAC,,, | Performed by: OPHTHALMOLOGY

## 2018-07-02 PROCEDURE — 99499 UNLISTED E&M SERVICE: CPT | Mod: S$GLB,,, | Performed by: OPHTHALMOLOGY

## 2018-07-02 NOTE — PROGRESS NOTES
HPI     11 day PO  S/p 25g PPV/AFx/EL/C3F8 OS for RRD  DLS- 06/21/2018      Pt sts OS vision clearing up still looks like looking through water   Still taking PF & HA BID & oint QPM       HPI     Post-op Evaluation    Additional comments: 1 day PO           Comments   S/p 25g PPV/AFx/EL/C3F8 OS for RRD      A/P    1. RRD OS with multiple breaks    s/p 25g PPV/AFx/EL/C3F8 OS for RRD 6/20/28      Doing well, IOP slightly elevated, will back off steroids    Continue PF/HA BID  Sleep on either side      2. PCIOL OU    3. PVD OD    4. DM  No DR  BS/BP/chol control      4-5 weeks

## 2018-07-09 ENCOUNTER — LAB VISIT (OUTPATIENT)
Dept: LAB | Facility: HOSPITAL | Age: 68
End: 2018-07-09
Attending: INTERNAL MEDICINE
Payer: MEDICARE

## 2018-07-09 DIAGNOSIS — R80.9 PROTEINURIA: ICD-10-CM

## 2018-07-09 DIAGNOSIS — E78.5 HYPERLIPEMIA: ICD-10-CM

## 2018-07-09 DIAGNOSIS — M10.9 GOUT, UNSPECIFIED: ICD-10-CM

## 2018-07-09 DIAGNOSIS — I10 ESSENTIAL HYPERTENSION, MALIGNANT: ICD-10-CM

## 2018-07-09 DIAGNOSIS — R60.9 EDEMA: ICD-10-CM

## 2018-07-09 DIAGNOSIS — N18.2 CHRONIC KIDNEY DISEASE, STAGE II (MILD): ICD-10-CM

## 2018-07-09 DIAGNOSIS — R94.4 NONSPECIFIC ABNORMAL RESULTS OF KIDNEY FUNCTION STUDY: ICD-10-CM

## 2018-07-09 DIAGNOSIS — N18.30 CHRONIC KIDNEY DISEASE, STAGE III (MODERATE): ICD-10-CM

## 2018-07-09 DIAGNOSIS — N25.81 SECONDARY HYPERPARATHYROIDISM OF RENAL ORIGIN: ICD-10-CM

## 2018-07-09 DIAGNOSIS — E87.20 ACIDOSIS: ICD-10-CM

## 2018-07-09 DIAGNOSIS — Z90.5 ACQUIRED ABSENCE OF KIDNEY: ICD-10-CM

## 2018-07-09 DIAGNOSIS — R94.4 NONSPECIFIC ABNORMAL RESULTS OF KIDNEY FUNCTION STUDY: Primary | ICD-10-CM

## 2018-07-09 LAB
25(OH)D3+25(OH)D2 SERPL-MCNC: 27 NG/ML
ALBUMIN SERPL BCP-MCNC: 4 G/DL
ANION GAP SERPL CALC-SCNC: 8 MMOL/L
BASOPHILS # BLD AUTO: 0.05 K/UL
BASOPHILS NFR BLD: 0.5 %
BUN SERPL-MCNC: 54 MG/DL
CALCIUM SERPL-MCNC: 10 MG/DL
CHLORIDE SERPL-SCNC: 112 MMOL/L
CO2 SERPL-SCNC: 21 MMOL/L
CREAT SERPL-MCNC: 1.9 MG/DL
DIFFERENTIAL METHOD: ABNORMAL
EOSINOPHIL # BLD AUTO: 0.4 K/UL
EOSINOPHIL NFR BLD: 3.7 %
ERYTHROCYTE [DISTWIDTH] IN BLOOD BY AUTOMATED COUNT: 14.6 %
EST. GFR  (AFRICAN AMERICAN): 40.4 ML/MIN/1.73 M^2
EST. GFR  (NON AFRICAN AMERICAN): 34.9 ML/MIN/1.73 M^2
ESTIMATED AVG GLUCOSE: 154 MG/DL
GLUCOSE SERPL-MCNC: 120 MG/DL
HBA1C MFR BLD HPLC: 7 %
HCT VFR BLD AUTO: 43.1 %
HGB BLD-MCNC: 13.4 G/DL
IMM GRANULOCYTES # BLD AUTO: 0.03 K/UL
IMM GRANULOCYTES NFR BLD AUTO: 0.3 %
LYMPHOCYTES # BLD AUTO: 2.3 K/UL
LYMPHOCYTES NFR BLD: 22.4 %
MAGNESIUM SERPL-MCNC: 2.5 MG/DL
MCH RBC QN AUTO: 30.1 PG
MCHC RBC AUTO-ENTMCNC: 31.1 G/DL
MCV RBC AUTO: 97 FL
MONOCYTES # BLD AUTO: 0.7 K/UL
MONOCYTES NFR BLD: 7.3 %
NEUTROPHILS # BLD AUTO: 6.7 K/UL
NEUTROPHILS NFR BLD: 65.8 %
NRBC BLD-RTO: 0 /100 WBC
PHOSPHATE SERPL-MCNC: 3.4 MG/DL
PHOSPHATE SERPL-MCNC: 3.4 MG/DL
PLATELET # BLD AUTO: 246 K/UL
PMV BLD AUTO: 11.2 FL
POTASSIUM SERPL-SCNC: 4.6 MMOL/L
PTH-INTACT SERPL-MCNC: 33 PG/ML
RBC # BLD AUTO: 4.45 M/UL
SODIUM SERPL-SCNC: 141 MMOL/L
WBC # BLD AUTO: 10.19 K/UL

## 2018-07-09 PROCEDURE — 36415 COLL VENOUS BLD VENIPUNCTURE: CPT | Mod: PO

## 2018-07-09 PROCEDURE — 82306 VITAMIN D 25 HYDROXY: CPT

## 2018-07-09 PROCEDURE — 83735 ASSAY OF MAGNESIUM: CPT

## 2018-07-09 PROCEDURE — 83036 HEMOGLOBIN GLYCOSYLATED A1C: CPT

## 2018-07-09 PROCEDURE — 80069 RENAL FUNCTION PANEL: CPT

## 2018-07-09 PROCEDURE — 83970 ASSAY OF PARATHORMONE: CPT

## 2018-07-09 PROCEDURE — 85025 COMPLETE CBC W/AUTO DIFF WBC: CPT

## 2018-07-11 ENCOUNTER — LAB VISIT (OUTPATIENT)
Dept: LAB | Facility: HOSPITAL | Age: 68
End: 2018-07-11
Attending: INTERNAL MEDICINE
Payer: MEDICARE

## 2018-07-11 DIAGNOSIS — N18.30 CHRONIC KIDNEY DISEASE, STAGE III (MODERATE): ICD-10-CM

## 2018-07-11 DIAGNOSIS — N18.30 CHRONIC KIDNEY DISEASE, STAGE III (MODERATE): Primary | ICD-10-CM

## 2018-07-11 LAB
ALBUMIN SERPL BCP-MCNC: 3.9 G/DL
ANION GAP SERPL CALC-SCNC: 11 MMOL/L
BUN SERPL-MCNC: 47 MG/DL
CALCIUM SERPL-MCNC: 9.9 MG/DL
CHLORIDE SERPL-SCNC: 111 MMOL/L
CO2 SERPL-SCNC: 21 MMOL/L
CREAT SERPL-MCNC: 1.6 MG/DL
EST. GFR  (AFRICAN AMERICAN): 49.7 ML/MIN/1.73 M^2
EST. GFR  (NON AFRICAN AMERICAN): 43 ML/MIN/1.73 M^2
GLUCOSE SERPL-MCNC: 112 MG/DL
PHOSPHATE SERPL-MCNC: 2.6 MG/DL
POTASSIUM SERPL-SCNC: 4.1 MMOL/L
SODIUM SERPL-SCNC: 143 MMOL/L

## 2018-07-11 PROCEDURE — 80069 RENAL FUNCTION PANEL: CPT

## 2018-07-11 PROCEDURE — 36415 COLL VENOUS BLD VENIPUNCTURE: CPT | Mod: PO

## 2018-07-12 DIAGNOSIS — N18.9 CHRONIC KIDNEY DISEASE, UNSPECIFIED CKD STAGE: ICD-10-CM

## 2018-07-31 RX ORDER — ALLOPURINOL 300 MG/1
TABLET ORAL
Qty: 180 TABLET | Refills: 3 | Status: ON HOLD | OUTPATIENT
Start: 2018-07-31 | End: 2018-08-15 | Stop reason: HOSPADM

## 2018-07-31 RX ORDER — GLIPIZIDE 10 MG/1
TABLET ORAL
Qty: 180 TABLET | Refills: 3 | Status: ON HOLD | OUTPATIENT
Start: 2018-07-31 | End: 2018-08-15 | Stop reason: HOSPADM

## 2018-08-06 ENCOUNTER — TELEPHONE (OUTPATIENT)
Dept: FAMILY MEDICINE | Facility: CLINIC | Age: 68
End: 2018-08-06

## 2018-08-06 NOTE — TELEPHONE ENCOUNTER
Renal function is stable. Diabetes test and cholesterol level both increased. please make sure he is taking all of his medications as prescribed.

## 2018-08-13 ENCOUNTER — OFFICE VISIT (OUTPATIENT)
Dept: OPHTHALMOLOGY | Facility: CLINIC | Age: 68
End: 2018-08-13
Payer: MEDICARE

## 2018-08-13 ENCOUNTER — TELEPHONE (OUTPATIENT)
Dept: OPHTHALMOLOGY | Facility: CLINIC | Age: 68
End: 2018-08-13

## 2018-08-13 DIAGNOSIS — H33.022 RETINAL DETACHMENT OF LEFT EYE WITH MULTIPLE BREAKS: Primary | ICD-10-CM

## 2018-08-13 DIAGNOSIS — H35.22 PROLIFERATIVE VITREORETINOPATHY, LEFT: ICD-10-CM

## 2018-08-13 PROCEDURE — 99024 POSTOP FOLLOW-UP VISIT: CPT | Mod: S$GLB,,, | Performed by: OPHTHALMOLOGY

## 2018-08-13 PROCEDURE — 99999 PR PBB SHADOW E&M-EST. PATIENT-LVL III: CPT | Mod: PBBFAC,,, | Performed by: OPHTHALMOLOGY

## 2018-08-13 NOTE — PROGRESS NOTES
HPI     Post-op Evaluation      Additional comments: 1  month check              Comments     DLS 7/2/18- Vision improved OS but he is seeing a bend in his vision. He noticed blurry vision before he started seeing bend. He still sees a little bit of the bubble        A/P    1. RRD OS with multiple breaks    s/p 25g PPV/AFx/EL/C3F8 OS for RRD 6/20/28 8/13/18 - Recurrent inferior RRD with PVR OS    Plan /270 25g PPV/membrane stripping/EL/AFx/1000cs Katt oil OS for RRD with PVR    General   2 hours    Risks, benefits, and alternatives to treatment discussed in detail with the patient.  The patient voiced understanding and wished to proceed with the procedure    Continue PF/HA BID  Sleep on either side      2. PCIOL OU    3. PVD OD    4. DM  No   BS/BP/chol control      To OR

## 2018-08-14 ENCOUNTER — ANESTHESIA EVENT (OUTPATIENT)
Dept: SURGERY | Facility: HOSPITAL | Age: 68
End: 2018-08-14
Payer: MEDICARE

## 2018-08-14 ENCOUNTER — TELEPHONE (OUTPATIENT)
Dept: OPHTHALMOLOGY | Facility: CLINIC | Age: 68
End: 2018-08-14

## 2018-08-14 DIAGNOSIS — H33.22 RETINAL DETACHMENT, LEFT: Primary | ICD-10-CM

## 2018-08-15 ENCOUNTER — ANESTHESIA (OUTPATIENT)
Dept: SURGERY | Facility: HOSPITAL | Age: 68
End: 2018-08-15
Payer: MEDICARE

## 2018-08-15 ENCOUNTER — TELEPHONE (OUTPATIENT)
Dept: OPHTHALMOLOGY | Facility: CLINIC | Age: 68
End: 2018-08-15

## 2018-08-15 ENCOUNTER — HOSPITAL ENCOUNTER (OUTPATIENT)
Facility: HOSPITAL | Age: 68
Discharge: HOME | End: 2018-08-15
Attending: OPHTHALMOLOGY | Admitting: OPHTHALMOLOGY
Payer: MEDICARE

## 2018-08-15 VITALS
WEIGHT: 287 LBS | SYSTOLIC BLOOD PRESSURE: 136 MMHG | BODY MASS INDEX: 42.51 KG/M2 | DIASTOLIC BLOOD PRESSURE: 54 MMHG | OXYGEN SATURATION: 98 % | HEART RATE: 85 BPM | RESPIRATION RATE: 18 BRPM | HEIGHT: 69 IN | TEMPERATURE: 98 F

## 2018-08-15 DIAGNOSIS — H35.22 PROLIFERATIVE VITREORETINOPATHY, LEFT: ICD-10-CM

## 2018-08-15 DIAGNOSIS — H33.022 RETINAL DETACHMENT OF LEFT EYE WITH MULTIPLE BREAKS: Primary | ICD-10-CM

## 2018-08-15 LAB
POCT GLUCOSE: 120 MG/DL (ref 70–110)
POCT GLUCOSE: 138 MG/DL (ref 70–110)

## 2018-08-15 PROCEDURE — 67108 REPAIR DETACHED RETINA: CPT | Mod: 78,LT,, | Performed by: OPHTHALMOLOGY

## 2018-08-15 PROCEDURE — 25000003 PHARM REV CODE 250: Performed by: OPHTHALMOLOGY

## 2018-08-15 PROCEDURE — 36000708 HC OR TIME LEV III 1ST 15 MIN: Performed by: OPHTHALMOLOGY

## 2018-08-15 PROCEDURE — 63600175 PHARM REV CODE 636 W HCPCS: Performed by: NURSE ANESTHETIST, CERTIFIED REGISTERED

## 2018-08-15 PROCEDURE — 27800903 OPTIME MED/SURG SUP & DEVICES OTHER IMPLANTS: Performed by: OPHTHALMOLOGY

## 2018-08-15 PROCEDURE — 37000008 HC ANESTHESIA 1ST 15 MINUTES: Performed by: OPHTHALMOLOGY

## 2018-08-15 PROCEDURE — C1814 RETINAL TAMP, SILICONE OIL: HCPCS | Performed by: OPHTHALMOLOGY

## 2018-08-15 PROCEDURE — 25000003 PHARM REV CODE 250: Performed by: NURSE ANESTHETIST, CERTIFIED REGISTERED

## 2018-08-15 PROCEDURE — 71000044 HC DOSC ROUTINE RECOVERY FIRST HOUR: Performed by: OPHTHALMOLOGY

## 2018-08-15 PROCEDURE — D9220A PRA ANESTHESIA: Mod: ANES,,, | Performed by: ANESTHESIOLOGY

## 2018-08-15 PROCEDURE — 36556 INSERT NON-TUNNEL CV CATH: CPT | Performed by: UROLOGY

## 2018-08-15 PROCEDURE — 37000009 HC ANESTHESIA EA ADD 15 MINS: Performed by: OPHTHALMOLOGY

## 2018-08-15 PROCEDURE — 63600175 PHARM REV CODE 636 W HCPCS: Performed by: OPHTHALMOLOGY

## 2018-08-15 PROCEDURE — 82962 GLUCOSE BLOOD TEST: CPT | Mod: 91 | Performed by: OPHTHALMOLOGY

## 2018-08-15 PROCEDURE — D9220A PRA ANESTHESIA: Mod: CRNA,,, | Performed by: NURSE ANESTHETIST, CERTIFIED REGISTERED

## 2018-08-15 PROCEDURE — C1784 OCULAR DEV, INTRAOP, DET RET: HCPCS | Performed by: OPHTHALMOLOGY

## 2018-08-15 PROCEDURE — 36000709 HC OR TIME LEV III EA ADD 15 MIN: Performed by: OPHTHALMOLOGY

## 2018-08-15 PROCEDURE — 71000045 HC DOSC ROUTINE RECOVERY EA ADD'L HR: Performed by: OPHTHALMOLOGY

## 2018-08-15 PROCEDURE — 71000015 HC POSTOP RECOV 1ST HR: Performed by: OPHTHALMOLOGY

## 2018-08-15 PROCEDURE — 82962 GLUCOSE BLOOD TEST: CPT | Performed by: OPHTHALMOLOGY

## 2018-08-15 PROCEDURE — S0020 INJECTION, BUPIVICAINE HYDRO: HCPCS | Performed by: OPHTHALMOLOGY

## 2018-08-15 DEVICE — IMPLANTABLE DEVICE: Type: IMPLANTABLE DEVICE | Site: EYE | Status: FUNCTIONAL

## 2018-08-15 RX ORDER — PREDNISOLONE ACETATE 10 MG/ML
1 SUSPENSION/ DROPS OPHTHALMIC
Status: DISCONTINUED | OUTPATIENT
Start: 2018-08-15 | End: 2018-08-15 | Stop reason: HOSPADM

## 2018-08-15 RX ORDER — CEPHALEXIN 500 MG/1
500 TABLET ORAL 2 TIMES DAILY
Qty: 20 TABLET | Refills: 0 | Status: SHIPPED | OUTPATIENT
Start: 2018-08-15 | End: 2018-08-25

## 2018-08-15 RX ORDER — VANCOMYCIN HYDROCHLORIDE 500 MG/10ML
INJECTION, POWDER, LYOPHILIZED, FOR SOLUTION INTRAVENOUS
Status: DISCONTINUED | OUTPATIENT
Start: 2018-08-15 | End: 2018-08-15 | Stop reason: HOSPADM

## 2018-08-15 RX ORDER — VANCOMYCIN HYDROCHLORIDE 500 MG/10ML
INJECTION, POWDER, LYOPHILIZED, FOR SOLUTION INTRAVENOUS
Status: DISCONTINUED
Start: 2018-08-15 | End: 2018-08-15 | Stop reason: HOSPADM

## 2018-08-15 RX ORDER — HYDROMORPHONE HYDROCHLORIDE 1 MG/ML
0.2 INJECTION, SOLUTION INTRAMUSCULAR; INTRAVENOUS; SUBCUTANEOUS EVERY 5 MIN PRN
Status: DISCONTINUED | OUTPATIENT
Start: 2018-08-15 | End: 2018-08-15 | Stop reason: HOSPADM

## 2018-08-15 RX ORDER — ACETAMINOPHEN 325 MG/1
650 TABLET ORAL EVERY 4 HOURS PRN
Status: DISCONTINUED | OUTPATIENT
Start: 2018-08-15 | End: 2018-08-15 | Stop reason: HOSPADM

## 2018-08-15 RX ORDER — PROPOFOL 10 MG/ML
VIAL (ML) INTRAVENOUS
Status: DISCONTINUED | OUTPATIENT
Start: 2018-08-15 | End: 2018-08-15

## 2018-08-15 RX ORDER — TROPICAMIDE 10 MG/ML
1 SOLUTION/ DROPS OPHTHALMIC
Status: DISCONTINUED | OUTPATIENT
Start: 2018-08-15 | End: 2018-08-15 | Stop reason: HOSPADM

## 2018-08-15 RX ORDER — MOXIFLOXACIN 5 MG/ML
1 SOLUTION/ DROPS OPHTHALMIC
Status: DISCONTINUED | OUTPATIENT
Start: 2018-08-15 | End: 2018-08-15 | Stop reason: HOSPADM

## 2018-08-15 RX ORDER — DEXAMETHASONE SODIUM PHOSPHATE 4 MG/ML
INJECTION, SOLUTION INTRA-ARTICULAR; INTRALESIONAL; INTRAMUSCULAR; INTRAVENOUS; SOFT TISSUE
Status: DISCONTINUED
Start: 2018-08-15 | End: 2018-08-15 | Stop reason: HOSPADM

## 2018-08-15 RX ORDER — LIDOCAINE HYDROCHLORIDE 20 MG/ML
INJECTION, SOLUTION EPIDURAL; INFILTRATION; INTRACAUDAL; PERINEURAL
Status: DISCONTINUED | OUTPATIENT
Start: 2018-08-15 | End: 2018-08-15 | Stop reason: HOSPADM

## 2018-08-15 RX ORDER — SODIUM CHLORIDE 0.9 % (FLUSH) 0.9 %
3 SYRINGE (ML) INJECTION
Status: DISCONTINUED | OUTPATIENT
Start: 2018-08-15 | End: 2018-08-15 | Stop reason: HOSPADM

## 2018-08-15 RX ORDER — PHENYLEPHRINE HYDROCHLORIDE 25 MG/ML
1 SOLUTION/ DROPS OPHTHALMIC
Status: DISCONTINUED | OUTPATIENT
Start: 2018-08-15 | End: 2018-08-15 | Stop reason: HOSPADM

## 2018-08-15 RX ORDER — CEFAZOLIN SODIUM 1 G/3ML
INJECTION, POWDER, FOR SOLUTION INTRAMUSCULAR; INTRAVENOUS
Status: DISCONTINUED | OUTPATIENT
Start: 2018-08-15 | End: 2018-08-15

## 2018-08-15 RX ORDER — TETRACAINE HYDROCHLORIDE 5 MG/ML
1 SOLUTION OPHTHALMIC
Status: DISCONTINUED | OUTPATIENT
Start: 2018-08-15 | End: 2018-08-15 | Stop reason: HOSPADM

## 2018-08-15 RX ORDER — ONDANSETRON 2 MG/ML
INJECTION INTRAMUSCULAR; INTRAVENOUS
Status: DISCONTINUED | OUTPATIENT
Start: 2018-08-15 | End: 2018-08-15

## 2018-08-15 RX ORDER — SUCCINYLCHOLINE CHLORIDE 20 MG/ML
INJECTION INTRAMUSCULAR; INTRAVENOUS
Status: DISCONTINUED | OUTPATIENT
Start: 2018-08-15 | End: 2018-08-15

## 2018-08-15 RX ORDER — MIDAZOLAM HYDROCHLORIDE 1 MG/ML
INJECTION, SOLUTION INTRAMUSCULAR; INTRAVENOUS
Status: DISCONTINUED | OUTPATIENT
Start: 2018-08-15 | End: 2018-08-15

## 2018-08-15 RX ORDER — ACETAZOLAMIDE 500 MG/5ML
INJECTION, POWDER, LYOPHILIZED, FOR SOLUTION INTRAVENOUS
Status: COMPLETED
Start: 2018-08-15 | End: 2018-08-15

## 2018-08-15 RX ORDER — EPINEPHRINE 1 MG/ML
INJECTION, SOLUTION INTRACARDIAC; INTRAMUSCULAR; INTRAVENOUS; SUBCUTANEOUS
Status: DISCONTINUED | OUTPATIENT
Start: 2018-08-15 | End: 2018-08-15 | Stop reason: HOSPADM

## 2018-08-15 RX ORDER — DEXAMETHASONE SODIUM PHOSPHATE 4 MG/ML
INJECTION, SOLUTION INTRA-ARTICULAR; INTRALESIONAL; INTRAMUSCULAR; INTRAVENOUS; SOFT TISSUE
Status: DISCONTINUED | OUTPATIENT
Start: 2018-08-15 | End: 2018-08-15 | Stop reason: HOSPADM

## 2018-08-15 RX ORDER — NEOSTIGMINE METHYLSULFATE 1 MG/ML
INJECTION, SOLUTION INTRAVENOUS
Status: DISCONTINUED | OUTPATIENT
Start: 2018-08-15 | End: 2018-08-15

## 2018-08-15 RX ORDER — ACETAMINOPHEN 10 MG/ML
INJECTION, SOLUTION INTRAVENOUS
Status: DISCONTINUED | OUTPATIENT
Start: 2018-08-15 | End: 2018-08-15

## 2018-08-15 RX ORDER — BUPIVACAINE HYDROCHLORIDE 7.5 MG/ML
INJECTION, SOLUTION EPIDURAL; RETROBULBAR
Status: DISCONTINUED | OUTPATIENT
Start: 2018-08-15 | End: 2018-08-15 | Stop reason: HOSPADM

## 2018-08-15 RX ORDER — LIDOCAINE HYDROCHLORIDE 20 MG/ML
INJECTION, SOLUTION EPIDURAL; INFILTRATION; INTRACAUDAL; PERINEURAL
Status: DISCONTINUED
Start: 2018-08-15 | End: 2018-08-15 | Stop reason: HOSPADM

## 2018-08-15 RX ORDER — ACETAZOLAMIDE 500 MG/5ML
INJECTION, POWDER, LYOPHILIZED, FOR SOLUTION INTRAVENOUS
Status: DISCONTINUED | OUTPATIENT
Start: 2018-08-15 | End: 2018-08-15

## 2018-08-15 RX ORDER — SODIUM CHLORIDE 9 MG/ML
INJECTION, SOLUTION INTRAVENOUS CONTINUOUS PRN
Status: DISCONTINUED | OUTPATIENT
Start: 2018-08-15 | End: 2018-08-15

## 2018-08-15 RX ORDER — LIDOCAINE HCL/PF 100 MG/5ML
SYRINGE (ML) INTRAVENOUS
Status: DISCONTINUED | OUTPATIENT
Start: 2018-08-15 | End: 2018-08-15

## 2018-08-15 RX ORDER — FENTANYL CITRATE 50 UG/ML
INJECTION, SOLUTION INTRAMUSCULAR; INTRAVENOUS
Status: DISCONTINUED | OUTPATIENT
Start: 2018-08-15 | End: 2018-08-15

## 2018-08-15 RX ORDER — ROCURONIUM BROMIDE 10 MG/ML
INJECTION, SOLUTION INTRAVENOUS
Status: DISCONTINUED | OUTPATIENT
Start: 2018-08-15 | End: 2018-08-15

## 2018-08-15 RX ORDER — ONDANSETRON 8 MG/1
TABLET, ORALLY DISINTEGRATING ORAL
Status: DISCONTINUED
Start: 2018-08-15 | End: 2018-08-15 | Stop reason: HOSPADM

## 2018-08-15 RX ORDER — NEOMYCIN SULFATE, POLYMYXIN B SULFATE, AND DEXAMETHASONE 3.5; 10000; 1 MG/G; [USP'U]/G; MG/G
OINTMENT OPHTHALMIC
Status: DISCONTINUED
Start: 2018-08-15 | End: 2018-08-15 | Stop reason: HOSPADM

## 2018-08-15 RX ORDER — HYDROCODONE BITARTRATE AND ACETAMINOPHEN 5; 325 MG/1; MG/1
1 TABLET ORAL EVERY 4 HOURS PRN
Status: DISCONTINUED | OUTPATIENT
Start: 2018-08-15 | End: 2018-08-15 | Stop reason: HOSPADM

## 2018-08-15 RX ORDER — METHYLPREDNISOLONE SOD SUCC 125 MG
VIAL (EA) INJECTION
Status: DISCONTINUED | OUTPATIENT
Start: 2018-08-15 | End: 2018-08-15

## 2018-08-15 RX ORDER — NEOMYCIN SULFATE, POLYMYXIN B SULFATE, AND DEXAMETHASONE 3.5; 10000; 1 MG/G; [USP'U]/G; MG/G
OINTMENT OPHTHALMIC
Status: DISCONTINUED | OUTPATIENT
Start: 2018-08-15 | End: 2018-08-15 | Stop reason: HOSPADM

## 2018-08-15 RX ORDER — GLYCOPYRROLATE 0.2 MG/ML
INJECTION INTRAMUSCULAR; INTRAVENOUS
Status: DISCONTINUED | OUTPATIENT
Start: 2018-08-15 | End: 2018-08-15

## 2018-08-15 RX ORDER — ONDANSETRON 4 MG/1
4 TABLET, FILM COATED ORAL EVERY 8 HOURS PRN
Qty: 20 TABLET | Refills: 0 | Status: SHIPPED | OUTPATIENT
Start: 2018-08-15 | End: 2018-11-20 | Stop reason: ALTCHOICE

## 2018-08-15 RX ORDER — METHYLPREDNISOLONE SOD SUCC 125 MG
VIAL (EA) INJECTION
Status: COMPLETED
Start: 2018-08-15 | End: 2018-08-15

## 2018-08-15 RX ORDER — OXYCODONE AND ACETAMINOPHEN 5; 325 MG/1; MG/1
1 TABLET ORAL EVERY 6 HOURS PRN
Qty: 30 TABLET | Refills: 0 | Status: SHIPPED | OUTPATIENT
Start: 2018-08-15 | End: 2018-11-20 | Stop reason: ALTCHOICE

## 2018-08-15 RX ORDER — EPHEDRINE SULFATE 50 MG/ML
INJECTION, SOLUTION INTRAVENOUS
Status: DISCONTINUED | OUTPATIENT
Start: 2018-08-15 | End: 2018-08-15

## 2018-08-15 RX ORDER — EPINEPHRINE 1 MG/ML
INJECTION, SOLUTION INTRACARDIAC; INTRAMUSCULAR; INTRAVENOUS; SUBCUTANEOUS
Status: DISCONTINUED
Start: 2018-08-15 | End: 2018-08-15 | Stop reason: HOSPADM

## 2018-08-15 RX ORDER — CYCLOPENTOLATE HYDROCHLORIDE 10 MG/ML
1 SOLUTION/ DROPS OPHTHALMIC
Status: DISCONTINUED | OUTPATIENT
Start: 2018-08-15 | End: 2018-08-15 | Stop reason: HOSPADM

## 2018-08-15 RX ORDER — ONDANSETRON 8 MG/1
8 TABLET, ORALLY DISINTEGRATING ORAL EVERY 8 HOURS PRN
Status: DISCONTINUED | OUTPATIENT
Start: 2018-08-15 | End: 2018-08-15 | Stop reason: HOSPADM

## 2018-08-15 RX ORDER — BUPIVACAINE HYDROCHLORIDE 7.5 MG/ML
INJECTION, SOLUTION EPIDURAL; RETROBULBAR
Status: DISCONTINUED
Start: 2018-08-15 | End: 2018-08-15 | Stop reason: HOSPADM

## 2018-08-15 RX ADMIN — ONDANSETRON 4 MG: 2 INJECTION INTRAMUSCULAR; INTRAVENOUS at 02:08

## 2018-08-15 RX ADMIN — PREDNISOLONE ACETATE 1 DROP: 10 SUSPENSION/ DROPS OPHTHALMIC at 10:08

## 2018-08-15 RX ADMIN — FENTANYL CITRATE 50 MCG: 50 INJECTION, SOLUTION INTRAMUSCULAR; INTRAVENOUS at 12:08

## 2018-08-15 RX ADMIN — NEOSTIGMINE METHYLSULFATE 3 MG: 1 INJECTION INTRAVENOUS at 02:08

## 2018-08-15 RX ADMIN — ACETAZOLAMIDE 250 MG: 500 INJECTION, POWDER, LYOPHILIZED, FOR SOLUTION INTRAVENOUS at 02:08

## 2018-08-15 RX ADMIN — GLYCOPYRROLATE 0.3 MG: 0.2 INJECTION, SOLUTION INTRAMUSCULAR; INTRAVENOUS at 02:08

## 2018-08-15 RX ADMIN — MOXIFLOXACIN HYDROCHLORIDE 1 DROP: 5 SOLUTION/ DROPS OPHTHALMIC at 10:08

## 2018-08-15 RX ADMIN — CYCLOPENTOLATE HYDROCHLORIDE 1 DROP: 10 SOLUTION/ DROPS OPHTHALMIC at 10:08

## 2018-08-15 RX ADMIN — TROPICAMIDE 1 DROP: 10 SOLUTION/ DROPS OPHTHALMIC at 10:08

## 2018-08-15 RX ADMIN — ACETAMINOPHEN 1000 MG: 10 INJECTION, SOLUTION INTRAVENOUS at 01:08

## 2018-08-15 RX ADMIN — SODIUM CHLORIDE: 0.9 INJECTION, SOLUTION INTRAVENOUS at 10:08

## 2018-08-15 RX ADMIN — FENTANYL CITRATE 50 MCG: 50 INJECTION, SOLUTION INTRAMUSCULAR; INTRAVENOUS at 02:08

## 2018-08-15 RX ADMIN — PHENYLEPHRINE HYDROCHLORIDE 1 DROP: 25 SOLUTION/ DROPS OPHTHALMIC at 10:08

## 2018-08-15 RX ADMIN — ONDANSETRON 8 MG: 8 TABLET, ORALLY DISINTEGRATING ORAL at 03:08

## 2018-08-15 RX ADMIN — ROCURONIUM BROMIDE 25 MG: 10 INJECTION, SOLUTION INTRAVENOUS at 12:08

## 2018-08-15 RX ADMIN — PROPOFOL 50 MG: 10 INJECTION, EMULSION INTRAVENOUS at 01:08

## 2018-08-15 RX ADMIN — TETRACAINE HYDROCHLORIDE 1 DROP: 5 SOLUTION OPHTHALMIC at 10:08

## 2018-08-15 RX ADMIN — CEFAZOLIN 3 G: 330 INJECTION, POWDER, FOR SOLUTION INTRAMUSCULAR; INTRAVENOUS at 12:08

## 2018-08-15 RX ADMIN — HOMATROPINE HYDROBROMIDE 1 DROP: 50 SOLUTION OPHTHALMIC at 10:08

## 2018-08-15 RX ADMIN — PROPOFOL 200 MG: 10 INJECTION, EMULSION INTRAVENOUS at 12:08

## 2018-08-15 RX ADMIN — MIDAZOLAM HYDROCHLORIDE 2 MG: 1 INJECTION, SOLUTION INTRAMUSCULAR; INTRAVENOUS at 11:08

## 2018-08-15 RX ADMIN — FENTANYL CITRATE 50 MCG: 50 INJECTION, SOLUTION INTRAMUSCULAR; INTRAVENOUS at 01:08

## 2018-08-15 RX ADMIN — SUCCINYLCHOLINE CHLORIDE 140 MG: 20 INJECTION, SOLUTION INTRAMUSCULAR; INTRAVENOUS at 12:08

## 2018-08-15 RX ADMIN — LIDOCAINE HYDROCHLORIDE 100 MG: 20 INJECTION, SOLUTION INTRAVENOUS at 12:08

## 2018-08-15 RX ADMIN — METHYLPREDNISOLONE SODIUM SUCCINATE 125 MG: 125 INJECTION, POWDER, FOR SOLUTION INTRAMUSCULAR; INTRAVENOUS at 01:08

## 2018-08-15 RX ADMIN — EPHEDRINE SULFATE 10 MG: 50 INJECTION, SOLUTION INTRAMUSCULAR; INTRAVENOUS; SUBCUTANEOUS at 01:08

## 2018-08-15 RX ADMIN — EPHEDRINE SULFATE 10 MG: 50 INJECTION, SOLUTION INTRAMUSCULAR; INTRAVENOUS; SUBCUTANEOUS at 12:08

## 2018-08-15 RX ADMIN — ROCURONIUM BROMIDE 5 MG: 10 INJECTION, SOLUTION INTRAVENOUS at 12:08

## 2018-08-15 RX ADMIN — SODIUM CHLORIDE, SODIUM GLUCONATE, SODIUM ACETATE, POTASSIUM CHLORIDE, MAGNESIUM CHLORIDE, SODIUM PHOSPHATE, DIBASIC, AND POTASSIUM PHOSPHATE: .53; .5; .37; .037; .03; .012; .00082 INJECTION, SOLUTION INTRAVENOUS at 01:08

## 2018-08-15 RX ADMIN — GLYCOPYRROLATE 0.2 MG: 0.2 INJECTION, SOLUTION INTRAMUSCULAR; INTRAVENOUS at 12:08

## 2018-08-15 NOTE — BRIEF OP NOTE
"Pre-Op Dx: Recurrent RRD with PVR OS    Post Op Dx: same    Procedure Performed: Scleral buckle 240/270 with 25g PPV/EL/AFx/1000 cs Silicone Oil (5.6cc) OS  EL #1044, P 150-200mW, D 0.1s    Attending Surgeon: Arthur    Assistant Surgeon: Oliva    Anesthesia: GETA, retrobulbar injection of 4.0cc mixture 0.75%Marcaine, 2% Xylocaine    Estimated blood loss: Minimal    Complication: None    Specimen: None    Disposition: Stable to recovery    Findings/Outcome: recurrent inferior detachment communicating with posterior retinotomy, few "aphakic breaks" anteriorly, minimal PVR that  with aspiration only    Date of Discharge: 8/15/18    Discharge Disposition: stable to recovery then home    F/U: tomorrow    "

## 2018-08-15 NOTE — DISCHARGE INSTRUCTIONS
Post Op Instructions:  Patient should Maintain Eye shield & Dressing until seen tomorrow in eye clinic  Tylenol as needed for general discomfort  Use Prescription for pain medication if pain is severe  Use Prescription for Nausea (Zofran) if nausea or vomiting  No excessive exercise   No Bending, Lifting or Straining  Call MD if significant pain or nausea / vomiting uncontrolled by medications  Call MD if temperature in excess of 101' F  Sleep on either side  Return to eye clinic for Post Op Examination tomorrow Morning.  Bring Medicine bag to tomorrow's appointment.        Having Scleral Buckling Surgery    Scleral buckling surgery is a type of eye surgery. Its done to correct a detached retina. It can restore vision.  What to tell your healthcare provider  Tell your healthcare provider about all the medicines you take. This includes over-the-counter medicines such as ibuprofen. It also includes vitamins, herbs, and other supplements. And tell your healthcare provider if you:  · Have had any recent changes in your health, such as an infection or fever  · Are sensitive or allergic to any medicines, latex, tape, or anesthesia (local and general)  · Are pregnant or think you may be pregnant  Tests before your surgery  You may need some exams before your procedure. Your doctor may use special tools to shine a light in your eye and look at your retina. You may need to have your eyes dilated for the eye exam. You also may have an ultrasound of your eye. This helps your doctor look at the detached retina.  Getting ready for your surgery  Talk with your healthcare provider about how to get ready for your procedure. You may need to stop taking some medicines before the procedure, such as blood thinners and aspirin. If you smoke, you may need to stop before your procedure. Smoking can delay healing. Talk with your healthcare provider if you need help to stop smoking.  Also, make sure to:  · Ask a family member or friend to  take you home from the hospital. You cannot drive yourself.  · Follow any directions you are given for not eating or drinking before surgery.  · You will be asked to sign a consent form that gives your permission to do the procedure. Read the form carefully. Ask questions if something is not clear.  On the day of your surgery  Talk with your eye surgeon about what to expect during your surgery. The details may vary. You will probably have scleral buckling surgery done in an operating room at the hospital. Your eye doctor will tell you what to expect during your procedure. A typical procedure goes like this:  · You may get medicine (anesthesia) to put you to sleep. If this is the case, you will sleep deeply through the surgery. Or you may be awake during the surgery. You will get medicine to help you relax. In this case your eye care provider may use eye drops and injections to make sure you dont feel anything.  · Your eye care provider may give you eye drops to dilate your eye.  · The surgeon will make a cut (incision) in the outer layer of your eye.  · He or she will use an ophthalmoscope to view your retina.  · The surgeon will use a device to seal the layers of your retina back together. In most cases, the device uses extreme cold to do this.  · The surgeon will put a very small band or buckle around the outside of your eyeball. This helps make sure that your retina stays in place.  · The surgeon may drain fluid from around your eye.  · He or she may put an antibiotic ointment on your eye. This is to help prevent infection.  · A patch will be put over your eye.  After your surgery  Ask your eye care provider about what you should expect after your surgery. In most cases you will be able to go home the same day. Plan to have someone drive you home from the procedure.  Recovering at home  Be sure to follow your doctors instructions about eye care. You may need to use eye drops with antibiotics to help prevent  infection. Your eye may be a little sore after the procedure. You should be able to take over-the-counter pain medicine. You may need to wear an eye patch for a day or so.  Follow-up care  You will need close follow-up care with your surgeon to see whether the surgery solved the problem. You may have a scheduled appointment the day after the procedure.  When to call your healthcare provider  Call your healthcare provider right away if you have any of these:  · Your eyesight seems worse  · Eye pain that doesnt get better, or gets worse  · Eye swelling that gets worse   Date Last Reviewed: 8/10/2015  © 9047-4429 [employer]. [address] [place] [address], [place] [address]. All rights reserved. This information is not intended as a substitute for professional medical care. Always follow your healthcare professional's instructions.        Recovery After Procedural Sedation (Adult)  You have been given medicine by vein to make you sleep during your surgery. This may have included both a pain medicine and sleeping medicine. Most of the effects have worn off. But you may still have some drowsiness for the next 6 to 8 hours.  Home care  Follow these guidelines when you get home:  · For the next 8 hours, you should be watched by a responsible adult. This person should make sure your condition is not getting worse.  · Don't drink any alcohol for the next 24 hours.  · Don't drive, operate dangerous machinery, or make important business or personal decisions during the next 24 hours.  Note: Your healthcare provider may tell you not to take any medicine by mouth for pain or sleep in the next 4 hours. These medicines may react with the medicines you were given in the hospital. This could cause a much stronger response than usual.  Follow-up care  Follow up with your healthcare provider if you are not alert and back to your usual level of activity within 12 hours.  When to seek medical advice  Call your healthcare  provider right away if any of these occur:  · Drowsiness gets worse  · Weakness or dizziness gets worse  · Repeated vomiting  · You can't be awakened   Date Last Reviewed: 10/18/2016  © 9622-8548 [employer] [address] [place] [address], [place] [address]. All rights reserved. This information is not intended as a substitute for professional medical care. Always follow your healthcare professional's instructions.

## 2018-08-15 NOTE — ANESTHESIA PREPROCEDURE EVALUATION
08/15/2018  Myron Noel is a 68 y.o., male.    Anesthesia Evaluation    I have reviewed the Patient Summary Reports.     I have reviewed the Medications.     Review of Systems  Anesthesia Hx:  Awareness with anesthesia many years ago,no problems since History of prior surgery of interest to airway management or planning: Denies Family Hx of Anesthesia complications.    Cardiovascular:   Exercise tolerance: good Hypertension Denies MI.    Denies Angina.    Pulmonary:   Denies Asthma. Sleep Apnea, CPAP    Renal/:   Chronic Renal Disease, CRI    Hepatic/GI:   GERD, well controlled    Neurological:  Neurology Normal    Endocrine:   Diabetes, well controlled, type 2        Physical Exam  General:  Obesity    Airway/Jaw/Neck:  Airway Findings: Mouth Opening: Normal Tongue: Normal  General Airway Assessment: Adult  Mallampati: III  Improves to II with phonation.  TM Distance: Normal, at least 6 cm      Dental:  Dental Findings: (poor dentition, many missing teeth)    Chest/Lungs:  Chest/Lungs Findings: Normal Respiratory Rate     Heart/Vascular:  Heart Findings: Rate: Normal        Mental Status:  Mental Status Findings:  Alert and Oriented         Anesthesia Plan  Type of Anesthesia, risks & benefits discussed:  Anesthesia Type:  general  Patient's Preference:   Intra-op Monitoring Plan: standard ASA monitors  Intra-op Monitoring Plan Comments:   Post Op Pain Control Plan: multimodal analgesia, IV/PO Opioids PRN and per primary service following discharge from PACU  Post Op Pain Control Plan Comments:   Induction:   IV  Beta Blocker:  Patient is not currently on a Beta-Blocker (No further documentation required).       Informed Consent: Patient understands risks and agrees with Anesthesia plan.  Questions answered. Anesthesia consent signed with patient.  ASA Score: 3     Day of Surgery Review of History &  Physical:    H&P update referred to the surgeon.         Ready For Surgery From Anesthesia Perspective.

## 2018-08-15 NOTE — OP NOTE
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    PROCEDURE PERFORMED:  A scleral buckle #240 band with 270 sleeve with a 25-gauge   pars plana vitrectomy with endolaser, air-fluid exchange, injection of 1000   centistoke silicone oil approximately 5.6 mL to the left eye.    ENDOLASER PARAMETERS:  Number of spots 1044, power 150 to 200 milliwatts,   duration 0.1 seconds.    ATTENDING SURGEON:  IRA Gibson M.D.    ASSISTANT SURGEON:  Fellow, Vadim Baptiste.    ANESTHESIA:  General endotracheal anesthesia with a retrobulbar injection of 4.0   mL mixture of 0.75 Marcaine and 2% Xylocaine.    ESTIMATED BLOOD LOSS:  Minimal.    COMPLICATIONS:  None.    DISPOSITION:  Stable to recovery.    INDICATIONS FOR SURGERY:  This is a 68-year-old male who underwent primary   retinal detachment repair on 06/20/2018 with a C3F8 gas bubble.  The patient   progressed okay until his postoperative visit with me two days prior to surgery,   was found to have a recurrent inferior detachment with some early PVR formation   and about a 15% gas fill.  Decision was made to take the patient to surgery to   place a scleral buckle and oil for long-term tamponade and to prevent against   recurrent detachment hopefully to improve some vision ever lost and possibly   restore some and prevent further vision loss.  Risks, benefits, and alternatives   of surgery were discussed in detail; risks including loss of vision, loss of   eye, recurrent retinal detachment, infection, hemorrhage, lens dislocation,   glaucoma, hypotony, ptosis and diplopia.  The patient voiced understanding and   wished to proceed with the procedure.    DESCRIPTION OF PROCEDURE:  After proper informed consent was obtained, the    patient was brought back to the Operative Suite at [facility] where   general anesthesia was induced.  The patient was prepped and draped in a sterile   fashion for ophthalmic surgery and lid speculum was placed in the left eye.  A   360-degree conjunctival peritomy was performed with blunt Tobin scissors and   the intramuscular septae were  with the curved Chaudhary scissors in four   quadrants.  Muscle hooks were used to secure the four recti muscles, which were   secured with 4-0 silk ties.  Four horizontal belt loops were fashioned just   anterior to the equator approximately 13 mm posterior to the limbus with a 57   blade and cleft palate blade.  A #240 band that was soaked in vancomycin was   passed through these belt loops and beneath the recti muscles and secured   superotemporally with a 270 sleeve.  Then, the vitrectomy portion of the   procedure was begun with a three-port pars plana vitrectomy with the infusion   cannula inserted inferotemporally 3.5 mm posterior to the limbus.  The infusion   was turned on only after observed to be free and clear of all underlying retinal   tissue.  Supranasal and supratemporal trocars were also placed 3.5 mm posterior   to the limbus.  The vitrector was inserted through the superotemporal trocar   and the residual gas bubble was removed with the vitrector on aspiration mode.    Then, the vitrector and light pipe were introduced in the vitreous cavity and   the posterior segment examination revealed an inferior detachment with   communication with a posterior retinotomy and a few small breaks anteriorly.    There were some small wispy PVR strands that were easily removed with the   vitrector on aspiration mode.  Soft tipped cannula was used to perform air-fluid   change from the prior posterior retinotomy and the retina significantly   flattened following this maneuver.  Endolaser was applied in a barrier fashion   at 360 degrees around the  posterior aspect of the scleral buckle indentation as   well as the bolstering the previous posterior retinotomy.  The buckle was   tightened to an appropriate height.  IV Solu-Medrol and Diamox were given during   this time.  The superonasal trocar was removed and closed with 7-0 Vicryl   suture and then 5.6 mL of 1000 centistoke silicone oil was infused to normal   pressure via palpation.  The supratemporal infusion trocars were removed and   closed with 7-0 Vicryl sutures and the eye was normal pressure via palpation   following this maneuver.  The 4-0 silk ties were removed.  The adjacent buckle   edges were trimmed and placed by underneath the associated recti muscles.  The   conjunctiva was brought forth and closed with several interrupted 6-0 plain gut   sutures.  Subconjunctival injections of vancomycin and Decadron were given to   the patient as well as a retrobulbar injection of 4.0 mL mixture of 0.75   Marcaine and 2% Xylocaine.  The drapes were removed from the patient.  He was   washed free of Betadine prep solution.  Maxitrol ointment was placed in the left   eye.  The eye was patch shielded.  General anesthesia was reversed.  He was   brought to the Recovery Room in stable condition, tolerating the procedure well.    Dr. Gibson was present for the entire case.      DAM/IN  dd: 08/15/2018 14:06:30 (CDT)  td: 08/15/2018 14:42:43 (CDT)  Doc ID   #[id number]  Job ID #[id number]    CC:

## 2018-08-15 NOTE — TRANSFER OF CARE
"Anesthesia Transfer of Care Note    Patient: Myron Noel    Procedure(s) Performed: Procedure(s) (LRB):  SCLERAL BUCKLING (Left)    Patient location: PACU    Anesthesia Type: general    Transport from OR: Transported from OR on 6-10 L/min O2 by face mask with adequate spontaneous ventilation    Post pain: adequate analgesia    Post assessment: no apparent anesthetic complications and tolerated procedure well    Post vital signs: stable    Level of consciousness: awake, alert and oriented    Nausea/Vomiting: no nausea/vomiting    Complications: none    Transfer of care protocol was followed      Last vitals:   Visit Vitals  BP (!) 164/73 (BP Location: Left arm, Patient Position: Lying)   Pulse 85   Temp 37.5 °C (99.5 °F) (Temporal)   Resp 18   Ht 5' 9" (1.753 m)   Wt 130.2 kg (287 lb)   SpO2 96%   BMI 42.38 kg/m²     "

## 2018-08-15 NOTE — PROGRESS NOTES
Call placed to speak with Dr Baptiste regarding which eye is the surgery for today. Surgical consent and case book states right eye, pt states he is having his left eye working on today. Eduardo OR RN speaking with Dr Baptiste in OR who states pt is having surgery on the left eye as stated in H&P. Confirmed that surgical eye is the left eye. Will implement eye drops in the left eye.

## 2018-08-15 NOTE — H&P
Pre-Operative History & Physical  Ophthalmology      SUBJECTIVE:     History of Present Illness:  Patient is a 68 y.o. male presents with Retinal detachment, left [H33.22].    MEDICATIONS:   No medications prior to admission.       ALLERGIES:   Review of patient's allergies indicates:   Allergen Reactions    Venom-honey bee Other (See Comments)     Passed out    Metformin      GI distress       PAST MEDICAL HISTORY:   Past Medical History:   Diagnosis Date    Cataract     ou done//    Chronic kidney disease 2001    nephrectomy for obstructive stones    Corneal abrasion, left     With leaf 40 yrs ago    Diabetes mellitus     Gout, chronic     Hypertension     Knee osteoarthritis 12/20/2013    Retinal detachment of left eye with multiple breaks 6/18/2018     PAST SURGICAL HISTORY:   Past Surgical History:   Procedure Laterality Date    APPENDECTOMY      CATARACT EXTRACTION  08/06/2014    od    CATARACT EXTRACTION W/  INTRAOCULAR LENS IMPLANT Left 11/5/14    EYE SURGERY      NEPHRECTOMY      TONSILLECTOMY       PAST FAMILY HISTORY:   Family History   Problem Relation Age of Onset    Heart defect Father     Alzheimer's disease Maternal Aunt     Diabetes Maternal Grandmother     Melanoma Neg Hx     Psoriasis Neg Hx     Lupus Neg Hx     Eczema Neg Hx      SOCIAL HISTORY:   Social History     Tobacco Use    Smoking status: Former Smoker     Types: Pipe    Smokeless tobacco: Never Used   Substance Use Topics    Alcohol use: No     Alcohol/week: 0.0 oz    Drug use: No        MENTAL STATUS: Alert    REVIEW OF SYSTEMS: Negative    OBJECTIVE:     Vital Signs (Most Recent)       Physical Exam:  General: NAD  HEENT: Atraumatic  Lungs: Adequate respirations, LCTAB  Heart: RRR, No murmur  Abdomen: Soft NT    ASSESSMENT/PLAN:     Patient is a 68 y.o. male with Retinal detachment, left [H33.22].     - Plan for surgical correction Plan /270 25g PPV/membrane stripping/EL/AFx/1000cs Katt oil OS for RRD  with PVR     General   2 hours   - Risks/benefits/alternatives of the procedure including, but not limited to scarring, bleeding, infection, loss or decreased vision, and/or need for possible repeat surgery discussed with the patient and family.   - Informed consent obtained prior to surgery and the patient/family voiced good understanding.    Musa Baptiste  8/15/2018  6:22 AM

## 2018-08-15 NOTE — INTERVAL H&P NOTE
Patient seen and examined today, H&P reviewed.  There are no changes to the patient's H&P.  There is still an ongoing indication for the procedure.  Will proceed with Plan /270 25g PPV/membrane stripping/EL/AFx/1000cs Katt oil OS for RRD with PVR. All questions answered.    PATRIA Ramos MD  PGY2, Ophthalmology Resident  08/15/2018  11:28 AM

## 2018-08-15 NOTE — PLAN OF CARE
Discharge instructions reviewed with pt and daughter via TAMMY julian. No complaints of pain reported. Pt able to tolerate PO intake. Complaints of nausea relieved with PRN medication. To be transported to car by PCT.

## 2018-08-16 ENCOUNTER — OFFICE VISIT (OUTPATIENT)
Dept: OPHTHALMOLOGY | Facility: CLINIC | Age: 68
End: 2018-08-16
Payer: MEDICARE

## 2018-08-16 DIAGNOSIS — H35.22 PROLIFERATIVE VITREORETINOPATHY, LEFT: ICD-10-CM

## 2018-08-16 DIAGNOSIS — H33.002 RHEGMATOGENOUS RETINAL DETACHMENT OF LEFT EYE: ICD-10-CM

## 2018-08-16 DIAGNOSIS — H33.022 RETINAL DETACHMENT OF LEFT EYE WITH MULTIPLE BREAKS: Primary | ICD-10-CM

## 2018-08-16 PROCEDURE — 99999 PR PBB SHADOW E&M-EST. PATIENT-LVL II: CPT | Mod: PBBFAC,,, | Performed by: OPHTHALMOLOGY

## 2018-08-16 PROCEDURE — 99024 POSTOP FOLLOW-UP VISIT: CPT | Mod: S$GLB,,, | Performed by: OPHTHALMOLOGY

## 2018-08-16 NOTE — PROGRESS NOTES
HPI     Post-op Evaluation      Additional comments: 1 day check              Comments     1 day post op- No eye pain after sx. He has a sore throat which was worse last night than it is today    HPI     Post-op Evaluation      Additional comments: 1  month check              Comments     DLS 7/2/18- Vision improved OS but he is seeing a bend in his vision. He noticed blurry vision before he started seeing bend. He still sees a little bit of the bubble        A/P    1. RRD OS with multiple breaks    s/p 25g PPV/AFx/EL/C3F8 OS for RRD 6/20/28 8/13/18 - Recurrent inferior RRD with PVR OS    s/p /270 25g PPV/membrane stripping/EL/AFx/1000cs Katt oil OS for RRD with PVR 8/15/18    Doing well, good IOP  Start gtts QID, ointment/shield QHS    Side sleep or inclined      2. PCIOL OU    3. PVD OD    4. DM  No DR  BS/BP/chol control

## 2018-08-17 NOTE — ANESTHESIA POSTPROCEDURE EVALUATION
"Anesthesia Post Evaluation    Patient: Myron Noel    Procedure(s) Performed: Procedure(s) (LRB):  SCLERAL BUCKLING (Left)    Final Anesthesia Type: general  Patient location during evaluation: PACU  Patient participation: Yes- Able to Participate  Level of consciousness: awake and alert  Pain management: adequate  Airway patency: patent  PONV status at discharge: No PONV  Anesthetic complications: no      Cardiovascular status: blood pressure returned to baseline  Respiratory status: unassisted, spontaneous ventilation and room air  Hydration status: euvolemic  Follow-up not needed.        Visit Vitals  BP (!) 136/54   Pulse 85   Temp 36.7 °C (98.1 °F) (Skin)   Resp 18   Ht 5' 9" (1.753 m)   Wt 130.2 kg (287 lb)   SpO2 98%   BMI 42.38 kg/m²       Pain/Yousif Score: Pain Assessment Performed: Yes (8/15/2018  5:15 PM)  Presence of Pain: denies (8/15/2018  5:15 PM)  Yousif Score: 10 (8/15/2018  4:30 PM)        "

## 2018-08-20 ENCOUNTER — OFFICE VISIT (OUTPATIENT)
Dept: OPHTHALMOLOGY | Facility: CLINIC | Age: 68
End: 2018-08-20
Payer: MEDICARE

## 2018-08-20 DIAGNOSIS — H33.022 RETINAL DETACHMENT OF LEFT EYE WITH MULTIPLE BREAKS: ICD-10-CM

## 2018-08-20 DIAGNOSIS — H33.002 RHEGMATOGENOUS RETINAL DETACHMENT OF LEFT EYE: Primary | ICD-10-CM

## 2018-08-20 DIAGNOSIS — H35.22 PROLIFERATIVE VITREORETINOPATHY, LEFT: ICD-10-CM

## 2018-08-20 PROCEDURE — 99024 POSTOP FOLLOW-UP VISIT: CPT | Mod: S$GLB,,, | Performed by: OPHTHALMOLOGY

## 2018-08-20 PROCEDURE — 99999 PR PBB SHADOW E&M-EST. PATIENT-LVL III: CPT | Mod: PBBFAC,,, | Performed by: OPHTHALMOLOGY

## 2018-08-20 RX ORDER — ALLOPURINOL 300 MG/1
TABLET ORAL
Qty: 180 TABLET | Refills: 3 | Status: SHIPPED | OUTPATIENT
Start: 2018-08-20 | End: 2019-09-25 | Stop reason: SDUPTHER

## 2018-08-20 RX ORDER — NEOMYCIN SULFATE, POLYMYXIN B SULFATE, AND DEXAMETHASONE 3.5; 10000; 1 MG/G; [USP'U]/G; MG/G
OINTMENT OPHTHALMIC NIGHTLY
COMMUNITY
End: 2018-12-04

## 2018-08-20 RX ORDER — GLIPIZIDE 10 MG/1
TABLET ORAL
Qty: 180 TABLET | Refills: 3 | Status: SHIPPED | OUTPATIENT
Start: 2018-08-20 | End: 2019-05-26 | Stop reason: SDUPTHER

## 2018-08-20 RX ORDER — MOXIFLOXACIN 5 MG/ML
1 SOLUTION/ DROPS OPHTHALMIC 4 TIMES DAILY
COMMUNITY
End: 2018-12-04

## 2018-08-20 RX ORDER — PREDNISOLONE ACETATE 10 MG/ML
1 SUSPENSION/ DROPS OPHTHALMIC 4 TIMES DAILY
COMMUNITY
End: 2018-12-04

## 2018-08-20 NOTE — PROGRESS NOTES
HPI     Post-op Evaluation      Additional comments: 4 day check              Comments     DLS 8/16/18- Vision blurry but he is starting to see a little bit. Eye is   no longer sore    PF x 4  HA x 4  vigamox x 4  Bill qhs      HPI     Post-op Evaluation      Additional comments: 1 day check              Comments     1 day post op- No eye pain after sx. He has a sore throat which was worse last night than it is today    HPI     Post-op Evaluation      Additional comments: 1  month check              Comments     DLS 7/2/18- Vision improved OS but he is seeing a bend in his vision. He noticed blurry vision before he started seeing bend. He still sees a little bit of the bubble        A/P    1. RRD OS with multiple breaks    s/p 25g PPV/AFx/EL/C3F8 OS for RRD 6/20/28 8/13/18 - Recurrent inferior RRD with PVR OS    s/p /270 25g PPV/membrane stripping/EL/AFx/1000cs Katt oil OS for RRD with PVR 8/15/18    Doing well, good IOP  Continue gtts QID, ointment/shield QHS  Taper PF 3/2/1    Side sleep or inclined      2. PCIOL OU    3. PVD OD    4. DM  No DR  BS/BP/chol control      1 month

## 2018-09-24 NOTE — ADDENDUM NOTE
Addendum  created 09/24/18 1356 by Braden Rey MD    Delete clinical note, Intraprocedure Blocks edited, Order Canceled from Note

## 2018-10-08 ENCOUNTER — OFFICE VISIT (OUTPATIENT)
Dept: OPHTHALMOLOGY | Facility: CLINIC | Age: 68
End: 2018-10-08
Payer: MEDICARE

## 2018-10-08 ENCOUNTER — TELEPHONE (OUTPATIENT)
Dept: FAMILY MEDICINE | Facility: CLINIC | Age: 68
End: 2018-10-08

## 2018-10-08 DIAGNOSIS — H35.22 PROLIFERATIVE VITREORETINOPATHY, LEFT: ICD-10-CM

## 2018-10-08 DIAGNOSIS — H33.022 RETINAL DETACHMENT OF LEFT EYE WITH MULTIPLE BREAKS: Primary | ICD-10-CM

## 2018-10-08 PROCEDURE — 99213 OFFICE O/P EST LOW 20 MIN: CPT | Mod: PBBFAC,PO | Performed by: OPHTHALMOLOGY

## 2018-10-08 PROCEDURE — 99024 POSTOP FOLLOW-UP VISIT: CPT | Mod: ,,, | Performed by: OPHTHALMOLOGY

## 2018-10-08 PROCEDURE — 99999 PR PBB SHADOW E&M-EST. PATIENT-LVL III: CPT | Mod: PBBFAC,,, | Performed by: OPHTHALMOLOGY

## 2018-10-08 NOTE — PROGRESS NOTES
HPI     Post-op Evaluation      Additional comments: 1 month check              Comments     DLS 8/20/18- Vision OS improved but still blurry      HPI     Post-op Evaluation      Additional comments: 4 day check              Comments     DLS 8/16/18- Vision blurry but he is starting to see a little bit. Eye is   no longer sore    PF x 4  HA x 4  vigamox x 4  Bill qhs      HPI     Post-op Evaluation      Additional comments: 1 day check              Comments     1 day post op- No eye pain after sx. He has a sore throat which was worse last night than it is today    HPI     Post-op Evaluation      Additional comments: 1  month check              Comments     DLS 7/2/18- Vision improved OS but he is seeing a bend in his vision. He noticed blurry vision before he started seeing bend. He still sees a little bit of the bubble        A/P    1. RRD OS with multiple breaks    s/p 25g PPV/AFx/EL/C3F8 OS for RRD 6/20/28 8/13/18 - Recurrent inferior RRD with PVR OS    s/p /270 25g PPV/membrane stripping/EL/AFx/1000cs Katt oil OS for RRD with PVR 8/15/18    Doing well, good IOP    Side sleep     10/18 - inferior RD beneath oil  May need retinectomy at time of oil removal vs heavy inferior laser from equator to ora      2. PCIOL OU    3. PVD OD    4. DM  No DR  BS/BP/chol control      2 months OCT

## 2018-10-08 NOTE — TELEPHONE ENCOUNTER
Called pt regarding below message. Pt requesting appt for flu shot. Appt date, time, and location given. Pt verbalized understanding with no further questions.       ----- Message from Trinh Mansfield sent at 10/8/2018 12:40 PM CDT -----  Contact: [phone] /[phone]  Pt is calling to schedule an appt for flu shot with nurse or doctor.      Thank you!

## 2018-10-09 ENCOUNTER — DOCUMENTATION ONLY (OUTPATIENT)
Dept: FAMILY MEDICINE | Facility: CLINIC | Age: 68
End: 2018-10-09

## 2018-10-09 NOTE — PROGRESS NOTES
Pre-Visit Chart Review  For Appointment Scheduled on 10/10/2018    Health Maintenance Due   Topic Date Due    Colonoscopy  11/28/2017    Foot Exam  06/07/2018    Influenza Vaccine  08/01/2018    Hemoglobin A1c  10/09/2018

## 2018-10-10 ENCOUNTER — TELEPHONE (OUTPATIENT)
Dept: FAMILY MEDICINE | Facility: CLINIC | Age: 68
End: 2018-10-10

## 2018-10-10 NOTE — TELEPHONE ENCOUNTER
Called pts daughter Britta regarding below message. Left voicemail with return number.     ----- Message from Chiqui Belle sent at 10/10/2018 11:46 AM CDT -----  Type: Needs Medical Advice    Who Called:  Daughter/Britta  Best Call Back Number: [phone] or [phone]  Additional Information: Has an appointment for a flu shot this afternoon but he is feeling ill and would like to reschedule.

## 2018-10-11 ENCOUNTER — DOCUMENTATION ONLY (OUTPATIENT)
Dept: FAMILY MEDICINE | Facility: CLINIC | Age: 68
End: 2018-10-11

## 2018-10-11 ENCOUNTER — TELEPHONE (OUTPATIENT)
Dept: FAMILY MEDICINE | Facility: CLINIC | Age: 68
End: 2018-10-11

## 2018-10-11 DIAGNOSIS — N18.9 CHRONIC KIDNEY DISEASE, UNSPECIFIED CKD STAGE: ICD-10-CM

## 2018-10-11 NOTE — TELEPHONE ENCOUNTER
Called pts  Britta in regards to rescheduling flu shot. Appt date, time, and location given. Britta verbalized understanding with no further questions.     ----- Message from Trinh Garcia sent at 10/10/2018  3:31 PM CDT -----  Contact: Britta  Type:  Patient Returning Call    Who Called:  wife  Who Left Message for Patient:  Faye   Does the patient know what this is regarding?:  Appointment reschedule  Best Call Back Number: [phone]  Additional Information:  Na

## 2018-10-11 NOTE — PROGRESS NOTES
Pre-Visit Chart Review  For Appointment Scheduled on 10/15/2018    Health Maintenance Due   Topic Date Due    Colonoscopy  11/28/2017    Foot Exam  06/07/2018    Influenza Vaccine  08/01/2018    Hemoglobin A1c  10/09/2018

## 2018-10-15 ENCOUNTER — CLINICAL SUPPORT (OUTPATIENT)
Dept: FAMILY MEDICINE | Facility: CLINIC | Age: 68
End: 2018-10-15
Payer: MEDICARE

## 2018-10-15 DIAGNOSIS — Z23 ENCOUNTER FOR IMMUNIZATION: Primary | ICD-10-CM

## 2018-10-15 PROCEDURE — 99211 OFF/OP EST MAY X REQ PHY/QHP: CPT | Mod: PBBFAC,PO | Performed by: FAMILY MEDICINE

## 2018-10-15 PROCEDURE — 99499 UNLISTED E&M SERVICE: CPT | Mod: S$PBB,,, | Performed by: FAMILY MEDICINE

## 2018-10-15 PROCEDURE — 90662 IIV NO PRSV INCREASED AG IM: CPT | Mod: PBBFAC,PO

## 2018-10-15 PROCEDURE — 99999 PR PBB SHADOW E&M-EST. PATIENT-LVL I: CPT | Mod: PBBFAC,,, | Performed by: FAMILY MEDICINE

## 2018-10-18 DIAGNOSIS — E11.9 TYPE 2 DIABETES MELLITUS WITHOUT COMPLICATION: ICD-10-CM

## 2018-10-26 ENCOUNTER — TELEPHONE (OUTPATIENT)
Dept: FAMILY MEDICINE | Facility: CLINIC | Age: 68
End: 2018-10-26

## 2018-10-26 RX ORDER — FENOFIBRATE 40 MG/1
40 TABLET ORAL DAILY
Qty: 90 TABLET | Refills: 3 | Status: SHIPPED | OUTPATIENT
Start: 2018-10-26 | End: 2018-12-04

## 2018-10-26 RX ORDER — FENOFIBRATE 160 MG/1
160 TABLET ORAL DAILY
Qty: 90 TABLET | Refills: 3 | Status: SHIPPED | OUTPATIENT
Start: 2018-10-26 | End: 2018-10-26

## 2018-10-26 RX ORDER — LISINOPRIL 20 MG/1
20 TABLET ORAL DAILY
Qty: 90 TABLET | Refills: 3 | Status: SHIPPED | OUTPATIENT
Start: 2018-10-26 | End: 2019-10-24 | Stop reason: SDUPTHER

## 2018-10-26 NOTE — TELEPHONE ENCOUNTER
Called pts daughter Britta regarding below message. Informed Britta of refills and decrease to Fenofibrate due to renal function. Britta states [facility]ckoy would like to review. Informed Britta I will send this information to Dr. Mckoy and return her call with additional information. Britta verbalized understanding with no further questions.     ----- Message from Dnag Phelan sent at 10/26/2018  3:33 PM CDT -----  Type:  Patient Returning Call    Who Called:  Daughter (Britta)  Who Left Message for Patient: Faye  Does the patient know what this is regarding?:  medication  Best Call Back Number:  [phone]  Additional Information:

## 2018-10-26 NOTE — TELEPHONE ENCOUNTER
Spoke with Britta and notified her of Dr. Mckoy's response.  Voiced understanding.  States she would still like to [facility] to have them in his chart here.  Fax number given [phone] and instructed to add attention to Annalee.  Voiced understanding.

## 2018-10-26 NOTE — TELEPHONE ENCOUNTER
Lisinopril refilled. Fenofibrate needs to be decreased based on renal function. Please call pharmacy and cancel the 160 mg and he needs to take 40 mg daily   DIZZINESS

## 2018-10-26 NOTE — TELEPHONE ENCOUNTER
Please find out if he is still taking the lisnopril because it says stop at discharge on the prescription.

## 2018-10-26 NOTE — TELEPHONE ENCOUNTER
Called pts daughter Britta regarding below message. Britta states pt has been taking fenofibrate and lisinopril despite discharge instructions. Unsure why it was discharged. Informed Britta I will send this information to Dr. Mckoy and return her call with additional information. Britta verbalized understanding with no further questions.     ----- Message from Denae Mireles sent at 10/26/2018  9:49 AM CDT -----  Contact: Britta Noel (Daughter)  Type: Needs Medical Advice    Who Called:  Britta Noel (Daughter)  Symptoms (please be specific):  na  How long has patient had these symptoms:  na  Pharmacy name and phone #:   [id number] - [place], [place]  [address]  [address]  [place] [address]  Phone: [phone] Fax: [phone]  Best Call Back Number: Britta at   Additional Information: Calling to speak with the Nurse about Fenofibrate 160 mg/ 3 month supply. The patient tried to get a refill but the Pharmacy doesn't have a record for it and told the patient to call the Doctor's office. Please  Advise.

## 2018-11-08 ENCOUNTER — TELEPHONE (OUTPATIENT)
Dept: FAMILY MEDICINE | Facility: CLINIC | Age: 68
End: 2018-11-08

## 2018-11-08 NOTE — TELEPHONE ENCOUNTER
Called Pharmacy regarding below message. Pharmacy stated a PA was completed for Fenofibrate 10 mg which was approved however not for the 40 mg. Pharmacy recommended initiating a PA for Fenofibrate 40 mg. PA initiated, forms placed in Dr. Mckoy's box.

## 2018-11-08 NOTE — TELEPHONE ENCOUNTER
Called pts daughter Britta regarding below message. Left voicemail with return number.     ----- Message from Anjelica Oconnor sent at 11/8/2018  9:46 AM CST -----  Contact: daughter Tammy  Patient daughter need to speak with nurse regarding patient could not get the cholesterol medication prescribed to him recently due to insurance would not cover it because of the low dosage of mg of medication per daughter     Daughter also will like nurse to call her to discuss what she should do far as insurance company     Please call [phone] home or daughter cell [phone]

## 2018-11-08 NOTE — TELEPHONE ENCOUNTER
Called pts daughter Britta regarding prescription concerns. Britta is unsure if a PA is requested. Informed Britta I will call pharmacy and insurance company. Britta verbalized understanding with no further questions.     ----- Message from Sandra Walker sent at 11/8/2018 10:02 AM CST -----  Contact: daughter Tammy Noel [phone] or [phone]  Call placed to pod. Patient's daughter returned your call, please call back.  Thank you!

## 2018-11-20 ENCOUNTER — PATIENT OUTREACH (OUTPATIENT)
Dept: ADMINISTRATIVE | Facility: HOSPITAL | Age: 68
End: 2018-11-20

## 2018-11-20 ENCOUNTER — TELEPHONE (OUTPATIENT)
Dept: ADMINISTRATIVE | Facility: HOSPITAL | Age: 68
End: 2018-11-20

## 2018-11-20 NOTE — LETTER
2018    Myron Noel  [id number] [place] [id number]             [facility]  [address]  [facility] [address]  Phone: [phone] Dear [facility] is committed to your overall health and would like to ensure that you are up to date on your recommended test and/or procedures.   Adelaida Mckoy MD  has found that your chart shows you may be due for the followin FOOT EXAM  HEMOGLOBIN  A1C  COLORECTAL CANCER SCREENING      If you have had any of the above done at another facility, please let us know so that we may obtain copies from that facility.  If you have a copy of these records, please provide a copy for us to scan into your chart.  You are welcome to request that the report be faxed to us at  ([phone]).     Otherwise, please schedule these appointments at your earliest convenience by calling [phone] or going to [email or web address].        Sincerely,  Your [facility]  MD Jud Pace LPN Clinical Care Coordinator  [facility] [facility] [address] [place] [address]  Phone [phone]  Fax [phone]

## 2018-11-20 NOTE — TELEPHONE ENCOUNTER
Pt daughter Britta called regarding current medications for pt. Britta states she believes pt is currently taking medication he should be taking. Informed Britta of current medication list that we have on file. Britta reports pt is no longer taking percocet or Zofran. Britta states pt has been taking atorvastatin 80 mg, Pantoprazole which was prescribed by another provider. Britta has questions if p should be taking Acrabose. Per Dr. Owusu discontinue Acrabose at discharge. Britta is very confused what mediations pt should be taking. Informed Britta pt has an upcoming appt on 12/4 with Dr. Mckoy. Instructed Britta to bring medications pt is currently taking so an accurate medication list can be added to his chart. Informed Britta at that time she can discuss which medications with Dr. Mckoy. Britta verbalized understanding with no further questions.

## 2018-11-21 ENCOUNTER — TELEPHONE (OUTPATIENT)
Dept: ADMINISTRATIVE | Facility: HOSPITAL | Age: 68
End: 2018-11-21

## 2018-11-28 ENCOUNTER — DOCUMENTATION ONLY (OUTPATIENT)
Dept: FAMILY MEDICINE | Facility: CLINIC | Age: 68
End: 2018-11-28

## 2018-11-28 NOTE — PROGRESS NOTES
Pre-Visit Chart Review  For Appointment Scheduled on 12/4/2018    Health Maintenance Due   Topic Date Due    Colonoscopy  11/28/2017    Foot Exam  06/07/2018    Hemoglobin A1c  10/09/2018

## 2018-12-04 ENCOUNTER — OFFICE VISIT (OUTPATIENT)
Dept: FAMILY MEDICINE | Facility: CLINIC | Age: 68
End: 2018-12-04
Payer: MEDICARE

## 2018-12-04 VITALS
HEIGHT: 69 IN | TEMPERATURE: 98 F | BODY MASS INDEX: 41.18 KG/M2 | SYSTOLIC BLOOD PRESSURE: 124 MMHG | DIASTOLIC BLOOD PRESSURE: 76 MMHG | WEIGHT: 278 LBS | HEART RATE: 77 BPM

## 2018-12-04 DIAGNOSIS — I15.2 HYPERTENSION ASSOCIATED WITH DIABETES: ICD-10-CM

## 2018-12-04 DIAGNOSIS — E11.69 HYPERLIPIDEMIA ASSOCIATED WITH TYPE 2 DIABETES MELLITUS: ICD-10-CM

## 2018-12-04 DIAGNOSIS — R41.3 MEMORY LOSS: ICD-10-CM

## 2018-12-04 DIAGNOSIS — R19.7 DIARRHEA, UNSPECIFIED TYPE: ICD-10-CM

## 2018-12-04 DIAGNOSIS — R80.9 TYPE 2 DIABETES MELLITUS WITH MICROALBUMINURIA, WITHOUT LONG-TERM CURRENT USE OF INSULIN: Primary | ICD-10-CM

## 2018-12-04 DIAGNOSIS — E11.59 HYPERTENSION ASSOCIATED WITH DIABETES: ICD-10-CM

## 2018-12-04 DIAGNOSIS — E78.5 HYPERLIPIDEMIA ASSOCIATED WITH TYPE 2 DIABETES MELLITUS: ICD-10-CM

## 2018-12-04 DIAGNOSIS — E11.29 TYPE 2 DIABETES MELLITUS WITH MICROALBUMINURIA, WITHOUT LONG-TERM CURRENT USE OF INSULIN: Primary | ICD-10-CM

## 2018-12-04 DIAGNOSIS — E66.01 OBESITY, CLASS III, BMI 40-49.9 (MORBID OBESITY): ICD-10-CM

## 2018-12-04 PROCEDURE — 99214 OFFICE O/P EST MOD 30 MIN: CPT | Mod: S$GLB,,, | Performed by: FAMILY MEDICINE

## 2018-12-04 PROCEDURE — 3074F SYST BP LT 130 MM HG: CPT | Mod: CPTII,S$GLB,, | Performed by: FAMILY MEDICINE

## 2018-12-04 PROCEDURE — 3045F PR MOST RECENT HEMOGLOBIN A1C LEVEL 7.0-9.0%: CPT | Mod: CPTII,S$GLB,, | Performed by: FAMILY MEDICINE

## 2018-12-04 PROCEDURE — 1101F PT FALLS ASSESS-DOCD LE1/YR: CPT | Mod: CPTII,S$GLB,, | Performed by: FAMILY MEDICINE

## 2018-12-04 PROCEDURE — 99999 PR PBB SHADOW E&M-EST. PATIENT-LVL IV: CPT | Mod: PBBFAC,,, | Performed by: FAMILY MEDICINE

## 2018-12-04 PROCEDURE — 3078F DIAST BP <80 MM HG: CPT | Mod: CPTII,S$GLB,, | Performed by: FAMILY MEDICINE

## 2018-12-04 RX ORDER — FENOFIBRATE 48 MG/1
48 TABLET, FILM COATED ORAL DAILY
Qty: 90 TABLET | Refills: 3 | Status: SHIPPED | OUTPATIENT
Start: 2018-12-04 | End: 2019-07-01 | Stop reason: SDUPTHER

## 2018-12-04 RX ORDER — FENOFIBRATE 160 MG/1
160 TABLET ORAL DAILY
Refills: 3 | COMMUNITY
Start: 2018-10-26 | End: 2018-12-04 | Stop reason: ALTCHOICE

## 2018-12-04 RX ORDER — ATORVASTATIN CALCIUM 80 MG/1
80 TABLET, FILM COATED ORAL DAILY
Refills: 3 | COMMUNITY
Start: 2018-09-30 | End: 2019-07-01 | Stop reason: SDUPTHER

## 2018-12-04 RX ORDER — FUROSEMIDE 20 MG/1
20 TABLET ORAL DAILY
Refills: 2 | COMMUNITY
Start: 2018-10-26

## 2018-12-04 RX ORDER — FENOFIBRATE 48 MG/1
48 TABLET, FILM COATED ORAL DAILY
Qty: 90 TABLET | Refills: 3 | Status: SHIPPED | OUTPATIENT
Start: 2018-12-04 | End: 2018-12-04 | Stop reason: SDUPTHER

## 2018-12-04 NOTE — PROGRESS NOTES
CHIEF COMPLAINT: follow up type 2 DM, HTN, hyperlipidemia      HISTORY OF PRESENT ILLNESS:  Myron Noel is a 68 y.o. male patient who presents to clinic for follow up on his chronic medical conditions. He is accompanied by his daughter.     1. Type 2 DM, microalbuminuria: A last  HgA1c was 7%. He is on precose, glucotrol. However he stopped taking his precose and has only been taking one glucotrol tablet daily. He is on an ACE-I, statin, ASA. He had evidence of microalbuminuria and is established with Dr. De Anda. He is up to date on his immunizations. He follows up with an ophthalmologist.  He is due for his foot exam.     2. Hyperlipidemia: He is on lipitor, fenofibrate, ASA. He denies any dark colored urine, myalgia. He is due for labs.    3. HTN: He is on lisinopril  and lasix. He denies any CP, cough, SOB.     4. CKD III: He follows up with Dr. De Anda.    5. He has chronic diarrhea, requests referral to GI.    6. They request referral to neurology for evaluation of memory loss.       REVIEW OF SYSTEMS:  The patient denies any fever, chills, night sweats, headaches, vision changes, difficulty speaking or swallowing, decreased hearing, weight loss, weight gain, chest pain, palpitations, shortness of breath, cough, nausea, vomiting, abdominal pain, dysuria, constipation, hematuria, hematochezia, melena, changes in his hair, skin,, numbness or weakness in his extremities, over any of his joints, myalgia, swollen glands, easy bruising, fatigue, edema.       MEDICATIONS:   Reviewed and/or reconciled in EPIC    ALLERGIES:  Reviewed and/or reconciled in [facility]    PAST MEDICAL/SURGICAL HISTORY:   Past Medical History:   Diagnosis Date    Cataract     ou done//    Chronic kidney disease 2001    nephrectomy for obstructive stones    Corneal abrasion, left     With leaf 40 yrs ago    Diabetes mellitus     Gout, chronic     Hypertension     Knee osteoarthritis 12/20/2013    Retinal detachment of left eye with multiple  breaks 6/18/2018      Past Surgical History:   Procedure Laterality Date    APPENDECTOMY      CATARACT EXTRACTION  08/06/2014    od    CATARACT EXTRACTION W/  INTRAOCULAR LENS IMPLANT Left 11/5/14    ESOPHAGOGASTRODUODENOSCOPY (EGD) N/A 6/23/2015    Per[facility]    EXCISION-PTERYGIUM Left 10/15/2014    [facility]ler [facility] at [facility] OR    EYE SURGERY      NEPHRECTOMY      phaco/pciol Left 11/5/2014    Performed by [facility]ler [facility] at [facility] OR    phaco/pciol  Right 8/6/2014    Performed by [facility]ler [facility] at [facility] OR    REPAIR OF RETINAL DETACHMENT WITH VITRECTOMY Left 6/20/2018    Procedure: REPAIR, RETINAL DETACHMENT, WITH VITRECTOMY;  Surgeon: IRA Gibson MD;  Location: [facility] OR [address] [place] [address];  Service: Ophthalmology;  Laterality: Left;  45 min    REPAIR, RETINAL DETACHMENT, WITH VITRECTOMY Left 6/20/2018    [facility]    SCLERAL BUCKLING Left 8/15/2018    Procedure: SCLERAL BUCKLING;  Surgeon: IRA Gibson MD;  Location: [facility] OR [address] [place] [address];  Service: Ophthalmology;  Laterality: Left;  240/270 SB  Vit.  2hrs    SCLERAL BUCKLING Left 8/15/2018    Performed by IRA Gibson MD at [facility] OR [address] [place] [address]    TONSILLECTOMY         FAMILY HISTORY:    Family History   Problem Relation Age of Onset    Heart defect Father     Alzheimer's disease Maternal Aunt     Diabetes Maternal Grandmother     Melanoma Neg Hx     Psoriasis Neg Hx     Lupus Neg Hx     Eczema Neg Hx        SOCIAL HISTORY:    Social History     Socioeconomic History    Marital status: Single     Spouse name: Not on file    Number of children: Not on file    Years of education: Not on file    Highest education level: Not on file   Social Needs    Financial resource strain: Not on file    Food insecurity - worry: Not on file    Food insecurity - inability: Not on file    Transportation needs - medical:  "Not on file    Transportation needs - non-medical: Not on file   Occupational History    Not on file   Tobacco Use    Smoking status: Former Smoker     Types: Pipe    Smokeless tobacco: Never Used   Substance and Sexual Activity    Alcohol use: No     Alcohol/week: 0.0 oz    Drug use: No    Sexual activity: Yes     Partners: Female   Other Topics Concern    Not on file   Social History Narrative    Not on file       PHYSICAL EXAM:  VITAL SIGNS:   Vitals:    12/04/18 1516   BP: (!) 143/78   Pulse: 77   Temp: 98.2 °F (36.8 °C)   Weight: 126.1 kg (278 lb)   Height: 5' 9" (1.753 m)     GENERAL:  Patient appears well nourished, sitting on exam table, in no acute distress.  HEENT:  Atraumatic, normocephalic, PERRLA, EOMI, no conjunctival injection, sclerae are anicteric, normal external auditory canals,TMs clear b/l, gross hearing intact to whisper, MMM, no oropharygneal erythema or exudate.  NECK:  Supple, normal ROM, trachea is midline , no supraclavicular or cervical LAD or masses palpated.   CARDIOVASCULAR:  RRR, normal S1 and S2, no m/r/g.  RESPIRATORY:  CTA b/l, no wheezes, rhonchi, rales.  No increased work of breathing, no  use of accessory muscles.  ABDOMEN:  Soft, nontender, nondistended, normoactive bowel sounds in all four quadrants, no rebound or guarding, no HSM or masses palpated.  Normal percussion.  EXTREMITIES:  2+ DP pulses b/l, no edema.  SKIN:  Warm, no lesions on exposed skin.  NEUROMUSCULAR:  Cranial nerves II-XII grossly intact. There is redness, swelling, erythema over dorsal surface of left foot. No clubbing or cyanosis of digits/nails, there is onychomycosis of finger and toenails  Steady gait.  PSYCH:  Patient is alert and oriented to person, time, place. They are appropriately dressed and groomed. There is normal eye contact. Rate and tone of speech is normal. Normal insight, judgement. Normal thought content and process.       LABORATORY/IMAGING STUDIES: pending    ASSESSMENT/PLAN: " This is a 68 y.o. male who presents to clinic for evaluation of the following concerns  1. Type 2 diabetes mellitus with microalbuminuria, without long-term current use of insulin  See below    2. Hypertension associated with diabetes  See below    3. Obesity, Class III, BMI 40-49.9 (morbid obesity)  See below    4. Hyperlipidemia associated with type 2 diabetes mellitus  - Comprehensive metabolic panel; Future  - Lipid panel; Future    5. Diarrhea, unspecified type  - Ambulatory referral to Gastroenterology    6. Memory loss  - Ambulatory referral to Neurology    Patient readiness: acceptance and barriers:none    During the course of the visit the patient was educated and counseled about the following:     Diabetes:  HgA1c,lipid panel, CMP.  Hypertension: continue with current medications. Discussed buying a new home BP cuff and presenting for nurse BP check when he comes in for his lab work.   Obesity:   General weight loss/lifestyle modification strategies discussed (elicit support from others; identify saboteurs; non-food rewards, etc).  Diet interventions: moderate (500 kCal/d) deficit diet.  Informal exercise measures discussed, e.g. taking stairs instead of elevator.  Regular aerobic exercise program discussed.    Goals: Diabetes: Maintain Hemoglobin A1C below 7, Hypertension: Reduce Blood Pressure and Obesity: Reduce calorie intake and BMI    Did patient meet goals/outcomes: No    The following self management tools provided: declined    Patient Instructions (the written plan) was given to the patient/family.     Time spent with patient: 30 minutes      FOLLOW UP: 6 months      Adelaida Mckoy MD

## 2018-12-07 ENCOUNTER — TELEPHONE (OUTPATIENT)
Dept: FAMILY MEDICINE | Facility: CLINIC | Age: 68
End: 2018-12-07

## 2018-12-07 ENCOUNTER — OFFICE VISIT (OUTPATIENT)
Dept: PODIATRY | Facility: CLINIC | Age: 68
End: 2018-12-07
Payer: MEDICARE

## 2018-12-07 ENCOUNTER — LAB VISIT (OUTPATIENT)
Dept: LAB | Facility: HOSPITAL | Age: 68
End: 2018-12-07
Attending: FAMILY MEDICINE
Payer: MEDICARE

## 2018-12-07 VITALS
HEIGHT: 69 IN | WEIGHT: 278 LBS | HEART RATE: 77 BPM | SYSTOLIC BLOOD PRESSURE: 124 MMHG | BODY MASS INDEX: 41.18 KG/M2 | DIASTOLIC BLOOD PRESSURE: 76 MMHG

## 2018-12-07 DIAGNOSIS — E11.29 TYPE 2 DIABETES MELLITUS WITH MICROALBUMINURIA, WITHOUT LONG-TERM CURRENT USE OF INSULIN: ICD-10-CM

## 2018-12-07 DIAGNOSIS — E78.5 HYPERLIPIDEMIA ASSOCIATED WITH TYPE 2 DIABETES MELLITUS: ICD-10-CM

## 2018-12-07 DIAGNOSIS — B35.1 ONYCHOMYCOSIS DUE TO DERMATOPHYTE: ICD-10-CM

## 2018-12-07 DIAGNOSIS — E11.9 ENCOUNTER FOR COMPREHENSIVE DIABETIC FOOT EXAMINATION, TYPE 2 DIABETES MELLITUS: Primary | ICD-10-CM

## 2018-12-07 DIAGNOSIS — R80.9 TYPE 2 DIABETES MELLITUS WITH MICROALBUMINURIA, WITHOUT LONG-TERM CURRENT USE OF INSULIN: ICD-10-CM

## 2018-12-07 DIAGNOSIS — E11.69 HYPERLIPIDEMIA ASSOCIATED WITH TYPE 2 DIABETES MELLITUS: ICD-10-CM

## 2018-12-07 DIAGNOSIS — E11.49 TYPE II DIABETES MELLITUS WITH NEUROLOGICAL MANIFESTATIONS: ICD-10-CM

## 2018-12-07 LAB
ALBUMIN SERPL BCP-MCNC: 3.9 G/DL
ALP SERPL-CCNC: 128 U/L
ALT SERPL W/O P-5'-P-CCNC: 21 U/L
ANION GAP SERPL CALC-SCNC: 11 MMOL/L
AST SERPL-CCNC: 17 U/L
BILIRUB SERPL-MCNC: 0.4 MG/DL
BUN SERPL-MCNC: 63 MG/DL
CALCIUM SERPL-MCNC: 10 MG/DL
CHLORIDE SERPL-SCNC: 110 MMOL/L
CHOLEST SERPL-MCNC: 141 MG/DL
CHOLEST/HDLC SERPL: 5.4 {RATIO}
CO2 SERPL-SCNC: 23 MMOL/L
CREAT SERPL-MCNC: 1.3 MG/DL
EST. GFR  (AFRICAN AMERICAN): >60 ML/MIN/1.73 M^2
EST. GFR  (NON AFRICAN AMERICAN): 55.3 ML/MIN/1.73 M^2
ESTIMATED AVG GLUCOSE: 140 MG/DL
GLUCOSE SERPL-MCNC: 160 MG/DL
HBA1C MFR BLD HPLC: 6.5 %
HDLC SERPL-MCNC: 26 MG/DL
HDLC SERPL: 18.4 %
LDLC SERPL CALC-MCNC: 82.8 MG/DL
NONHDLC SERPL-MCNC: 115 MG/DL
POTASSIUM SERPL-SCNC: 4.6 MMOL/L
PROT SERPL-MCNC: 7.3 G/DL
SODIUM SERPL-SCNC: 144 MMOL/L
TRIGL SERPL-MCNC: 161 MG/DL

## 2018-12-07 PROCEDURE — 99999 PR PBB SHADOW E&M-EST. PATIENT-LVL III: CPT | Mod: PBBFAC,,, | Performed by: PODIATRIST

## 2018-12-07 PROCEDURE — 99213 OFFICE O/P EST LOW 20 MIN: CPT | Mod: S$GLB,,, | Performed by: PODIATRIST

## 2018-12-07 PROCEDURE — 3078F DIAST BP <80 MM HG: CPT | Mod: CPTII,S$GLB,, | Performed by: PODIATRIST

## 2018-12-07 PROCEDURE — 3045F PR MOST RECENT HEMOGLOBIN A1C LEVEL 7.0-9.0%: CPT | Mod: CPTII,S$GLB,, | Performed by: PODIATRIST

## 2018-12-07 PROCEDURE — 36415 COLL VENOUS BLD VENIPUNCTURE: CPT | Mod: PO

## 2018-12-07 PROCEDURE — 3074F SYST BP LT 130 MM HG: CPT | Mod: CPTII,S$GLB,, | Performed by: PODIATRIST

## 2018-12-07 PROCEDURE — 1101F PT FALLS ASSESS-DOCD LE1/YR: CPT | Mod: CPTII,S$GLB,, | Performed by: PODIATRIST

## 2018-12-07 PROCEDURE — 83036 HEMOGLOBIN GLYCOSYLATED A1C: CPT

## 2018-12-07 PROCEDURE — 80061 LIPID PANEL: CPT

## 2018-12-07 PROCEDURE — 80053 COMPREHEN METABOLIC PANEL: CPT

## 2018-12-07 RX ORDER — CICLOPIROX 80 MG/ML
SOLUTION TOPICAL NIGHTLY
Qty: 6.6 ML | Refills: 11 | Status: SHIPPED | OUTPATIENT
Start: 2018-12-07 | End: 2019-04-04 | Stop reason: ALTCHOICE

## 2018-12-07 RX ORDER — PANTOPRAZOLE SODIUM 40 MG/1
40 TABLET, DELAYED RELEASE ORAL DAILY
COMMUNITY

## 2018-12-07 NOTE — PROGRESS NOTES
Subjective:      Patient ID: Myron Noel is a 68 y.o. male.    Chief Complaint: Diabetic Foot Exam (Dr Mckoy 12/04/2018)    Myron is a 68 y.o. male who presents to the clinic for evaluation and treatment of high risk feet. Myron has a past medical history of Cataract, Chronic kidney disease (2001), Corneal abrasion, left, Diabetes mellitus, Gout, chronic, Hypertension, Knee osteoarthritis (12/20/2013), and Retinal detachment of left eye with multiple breaks (6/18/2018). The patient's chief complaint is long, thick toenails.  Gradual onset, worsening over past several weeks, aggravated by increased weight bearing, shoe gear, pressure.  No previous medical treatment.  OTC pain med not helping.   This patient has documented high risk feet requiring routine maintenance secondary to diabetes mellitis and those secondary complications of diabetes, as mentioned..    PCP: Adelaida Mckoy MD    Date Last Seen by PCP:   Chief Complaint   Patient presents with    Diabetic Foot Exam     Dr Mckoy 12/04/2018         Current shoe gear:  Affected Foot: Casual shoes     Unaffected Foot: Casual shoes    Hemoglobin A1C   Date Value Ref Range Status   07/09/2018 7.0 (H) 4.0 - 5.6 % Final     Comment:     ADA Screening Guidelines:  5.7-6.4%  Consistent with prediabetes  >or=6.5%  Consistent with diabetes  High levels of fetal hemoglobin interfere with the HbA1C  assay. Heterozygous hemoglobin variants (HbS, HgC, etc)do  not significantly interfere with this assay.   However, presence of multiple variants may affect accuracy.     05/29/2018 7.1 (H) 4.0 - 5.6 % Final     Comment:     According to ADA guidelines, hemoglobin A1c <7.0% represents  optimal control in non-pregnant diabetic patients. Different  metrics may apply to specific patient populations.   Standards of Medical Care in Diabetes-2016.  For the purpose of screening for the presence of diabetes:  <5.7%     Consistent with the absence of diabetes  5.7-6.4%  Consistent  with increasing risk for diabetes   (prediabetes)  >or=6.5%  Consistent with diabetes  Currently, no consensus exists for use of hemoglobin A1c  for diagnosis of diabetes for children.  This Hemoglobin A1c assay has significant interference with fetal   hemoglobin   (HbF). The results are invalid for patients with abnormal amounts of   HbF,   including those with known Hereditary Persistence   of Fetal Hemoglobin. Heterozygous hemoglobin variants (HbAS, HbAC,   HbAD, HbAE, HbA2) do not significantly interfere with this assay;   however, presence of multiple variants in a sample may impact the %   interference.     12/29/2017 6.9 (H) 4.0 - 5.6 % Final     Comment:     According to ADA guidelines, hemoglobin A1c <7.0% represents  optimal control in non-pregnant diabetic patients. Different  metrics may apply to specific patient populations.   Standards of Medical Care in Diabetes-2016.  For the purpose of screening for the presence of diabetes:  <5.7%     Consistent with the absence of diabetes  5.7-6.4%  Consistent with increasing risk for diabetes   (prediabetes)  >or=6.5%  Consistent with diabetes  Currently, no consensus exists for use of hemoglobin A1c  for diagnosis of diabetes for children.  This Hemoglobin A1c assay has significant interference with fetal   hemoglobin   (HbF). The results are invalid for patients with abnormal amounts of   HbF,   including those with known Hereditary Persistence   of Fetal Hemoglobin. Heterozygous hemoglobin variants (HbAS, HbAC,   HbAD, HbAE, HbA2) do not significantly interfere with this assay;   however, presence of multiple variants in a sample may impact the %   interference.         Review of Systems   Constitution: Negative for chills, diaphoresis, fever, malaise/fatigue and night sweats.   Cardiovascular: Negative for claudication, cyanosis, leg swelling and syncope.   Skin: Positive for nail changes. Negative for color change, dry skin, rash, suspicious lesions and  unusual hair distribution.   Musculoskeletal: Negative for falls, joint pain, joint swelling, muscle cramps, muscle weakness and stiffness.   Gastrointestinal: Negative for constipation, diarrhea, nausea and vomiting.   Neurological: Positive for sensory change. Negative for brief paralysis, disturbances in coordination, focal weakness, numbness, paresthesias and tremors.           Objective:      Physical Exam   Constitutional: He is oriented to person, place, and time. He appears well-developed and well-nourished. He is cooperative.   Oriented to time, place, and person.   Cardiovascular:   Pulses:       Dorsalis pedis pulses are 2+ on the right side, and 2+ on the left side.        Posterior tibial pulses are 1+ on the right side, and 1+ on the left side.   Capillary fill time 3-5 seconds.  All toes warm to touch.      <2+ pitting lower extremity edema bilateral.    Negative elevational pallor and dependent rubor bilateral.     Musculoskeletal:   Normal angle, base, station of gait. Decreased stride length, early heel off, moderately propulsive toe off bilateral.    All ten toes without clubbing, cyanosis, or signs of ischemia.      No pain to palpation bilateral lower extremities.      Range of motion, stability, muscle strength, and muscle tone are age and health appropriate normal bilateral feet and legs.       Lymphadenopathy:   Negative lymphadenopathy bilateral popliteal fossa and tarsal tunnel.     Neurological: He is alert and oriented to person, place, and time. He has normal strength. He is not disoriented. He displays no atrophy and no tremor. No sensory deficit. He exhibits normal muscle tone.   Reflex Scores:       Patellar reflexes are 2+ on the right side and 2+ on the left side.       Achilles reflexes are 2+ on the right side and 2+ on the left side.    Negative tinel sign to percussion sural, superficial peroneal, deep peroneal, saphenous, and posterior tibial nerves right and left ankles and  feet.     Skin: Skin is warm, dry and intact. No abrasion, no bruising, no burn, no ecchymosis, no laceration, no lesion, no petechiae and no rash noted. He is not diaphoretic. No cyanosis or erythema. No pallor. Nails show no clubbing.   Skin thin, atrophic, with decreased density and distribution of pedal hair bilateral, but without  federico discoloration,  ulcers, masses, nodules or cords palpated bilateral feet and legs.    Mild hyperpigmentation both legs consistent with stasis.    Toenails 1st, 2nd, 3rd, 4th, 5th  bilateral are hypertrophic thickened 2-3 mm, dystrophic, discolored tanish brown with tan, gray crumbly subungual debris.  Neatly trimmed and not tender to distal nail plate pressure, without periungual skin abnormality of each.               Assessment:       Encounter Diagnoses   Name Primary?    Encounter for comprehensive diabetic foot examination, type 2 diabetes mellitus Yes    Onychomycosis due to dermatophyte     Type II diabetes mellitus with neurological manifestations          Plan:       Myron was seen today for diabetic foot exam.    Diagnoses and all orders for this visit:    Encounter for comprehensive diabetic foot examination, type 2 diabetes mellitus    Onychomycosis due to dermatophyte    Type II diabetes mellitus with neurological manifestations    Other orders  -     ciclopirox (PENLAC) 8 % Soln; Apply topically nightly.      I counseled the patient on his conditions, their implications and medical management.    - Shoe inspection. Diabetic Foot Education. Patient reminded of the importance of good nutrition and blood sugar control to help prevent podiatric complications of diabetes. Patient instructed on proper foot hygeine. We discussed wearing proper shoe gear, daily foot inspections, never walking without protective shoe gear, never putting sharp instruments to feet, routine podiatric visits at least annually.      Declines non covered foot care.    Discussed conservative  treatment with shoes of adequate dimensions, material, and style to alleviate symptoms and delay or prevent surgical intervention.         Follow-up in about 1 year (around 12/7/2019).

## 2018-12-07 NOTE — TELEPHONE ENCOUNTER
----- Message from Jesusita Adams sent at 12/7/2018  9:25 AM CST -----  Contact: DAUGHTER-Britta  [phone]  Daughter came to .  Said you wanted to know what the name and mg of the [facility] was.  Pantoprazole NA 40Mg EC Tab.  One tablet by mouth every day for acid reflux.  The provider that gave it to him is Britta Baez.

## 2018-12-10 ENCOUNTER — OFFICE VISIT (OUTPATIENT)
Dept: OPHTHALMOLOGY | Facility: CLINIC | Age: 68
End: 2018-12-10
Payer: MEDICARE

## 2018-12-10 VITALS — DIASTOLIC BLOOD PRESSURE: 72 MMHG | HEART RATE: 64 BPM | SYSTOLIC BLOOD PRESSURE: 143 MMHG

## 2018-12-10 DIAGNOSIS — H43.813 POSTERIOR VITREOUS DETACHMENT, BILATERAL: ICD-10-CM

## 2018-12-10 DIAGNOSIS — H33.022 RETINAL DETACHMENT OF LEFT EYE WITH MULTIPLE BREAKS: ICD-10-CM

## 2018-12-10 DIAGNOSIS — H33.22 LEFT RETINAL DETACHMENT: Primary | ICD-10-CM

## 2018-12-10 PROCEDURE — 92134 CPTRZ OPH DX IMG PST SGM RTA: CPT | Mod: S$GLB,,, | Performed by: OPHTHALMOLOGY

## 2018-12-10 PROCEDURE — 99999 PR PBB SHADOW E&M-EST. PATIENT-LVL III: CPT | Mod: PBBFAC,,, | Performed by: OPHTHALMOLOGY

## 2018-12-10 PROCEDURE — 92226 PR SPECIAL EYE EXAM, SUBSEQUENT: CPT | Mod: LT,S$GLB,, | Performed by: OPHTHALMOLOGY

## 2018-12-10 PROCEDURE — 92014 COMPRE OPH EXAM EST PT 1/>: CPT | Mod: S$GLB,,, | Performed by: OPHTHALMOLOGY

## 2018-12-10 NOTE — PROGRESS NOTES
HPI     DLS 10/08/18- Dr. Gibson    Pt states that vision is about the same everything is still blurry but not   worse.    Eye Med(s) - Artificial Tears - OU    LBS -     OCT - OD ME  OS  Fovea flat  Inferior macula fluid        A/P    1. RRD OS with multiple breaks    s/p 25g PPV/AFx/EL/C3F8 OS for RRD 6/20/28 8/13/18 - Recurrent inferior RRD with PVR OS    s/p /270 25g PPV/membrane stripping/EL/AFx/1000cs Katt oil OS for RRD with PVR 8/15/18    Doing well, good IOP    Side sleep     10/18 - inferior RD beneath oil   12/18 -stable    May need retinectomy at time of oil removal vs heavy inferior laser from equator to ora      2. PCIOL OU    3. PVD OD    4. DM  No DR  BS/BP/chol control      3 months OCT - set up oil removal at that time

## 2018-12-11 ENCOUNTER — TELEPHONE (OUTPATIENT)
Dept: PODIATRY | Facility: CLINIC | Age: 68
End: 2018-12-11

## 2018-12-11 ENCOUNTER — OFFICE VISIT (OUTPATIENT)
Dept: NEUROLOGY | Facility: CLINIC | Age: 68
End: 2018-12-11
Payer: MEDICARE

## 2018-12-11 VITALS
DIASTOLIC BLOOD PRESSURE: 64 MMHG | SYSTOLIC BLOOD PRESSURE: 106 MMHG | WEIGHT: 280.19 LBS | BODY MASS INDEX: 41.38 KG/M2 | HEART RATE: 73 BPM

## 2018-12-11 DIAGNOSIS — R80.9 TYPE 2 DIABETES MELLITUS WITH MICROALBUMINURIA, WITHOUT LONG-TERM CURRENT USE OF INSULIN: ICD-10-CM

## 2018-12-11 DIAGNOSIS — G31.84 MCI (MILD COGNITIVE IMPAIRMENT): Primary | ICD-10-CM

## 2018-12-11 DIAGNOSIS — E11.59 HYPERTENSION ASSOCIATED WITH DIABETES: ICD-10-CM

## 2018-12-11 DIAGNOSIS — G93.40 ENCEPHALOPATHY: ICD-10-CM

## 2018-12-11 DIAGNOSIS — E11.29 TYPE 2 DIABETES MELLITUS WITH MICROALBUMINURIA, WITHOUT LONG-TERM CURRENT USE OF INSULIN: ICD-10-CM

## 2018-12-11 DIAGNOSIS — I15.2 HYPERTENSION ASSOCIATED WITH DIABETES: ICD-10-CM

## 2018-12-11 PROCEDURE — 1100F PTFALLS ASSESS-DOCD GE2>/YR: CPT | Mod: CPTII,S$GLB,, | Performed by: NURSE PRACTITIONER

## 2018-12-11 PROCEDURE — 99999 PR PBB SHADOW E&M-EST. PATIENT-LVL III: CPT | Mod: PBBFAC,,, | Performed by: NURSE PRACTITIONER

## 2018-12-11 PROCEDURE — 99205 OFFICE O/P NEW HI 60 MIN: CPT | Mod: S$GLB,,, | Performed by: NURSE PRACTITIONER

## 2018-12-11 PROCEDURE — 3078F DIAST BP <80 MM HG: CPT | Mod: CPTII,S$GLB,, | Performed by: NURSE PRACTITIONER

## 2018-12-11 PROCEDURE — 3074F SYST BP LT 130 MM HG: CPT | Mod: CPTII,S$GLB,, | Performed by: NURSE PRACTITIONER

## 2018-12-11 PROCEDURE — 3044F HG A1C LEVEL LT 7.0%: CPT | Mod: CPTII,S$GLB,, | Performed by: NURSE PRACTITIONER

## 2018-12-11 PROCEDURE — 3288F FALL RISK ASSESSMENT DOCD: CPT | Mod: CPTII,S$GLB,, | Performed by: NURSE PRACTITIONER

## 2018-12-11 NOTE — TELEPHONE ENCOUNTER
----- Message from Lisa Riggs sent at 12/11/2018 12:55 PM CST -----  Contact: [facility] states medication ciclopirox (PENLAC) 8 % Soln is not covered by patients insurance and needs something else called in     Please call back     [id number] - [place], [place] - [address] AT [facility]  [address]  [place] [address]  Phone: [phone] Fax: [phone]

## 2018-12-11 NOTE — LETTER
December 17, 2018      Adelaida Mckoy MD  [address] [facility] [address]           [facility]  [address] [facility] [address] [place] [address]  Phone: [phone]  Fax: [phone]          Patient: Myron Noel   MR Number: [id number]   YOB: 1950   Date of Visit: 12/11/2018       Dear Dr. Adelaida Mckoy:    Thank you for referring Myron Noel to me for evaluation. Attached you will find relevant portions of my assessment and plan of care.    If you have questions, please do not hesitate to call me. I look forward to following Myron Noel along with you.    Sincerely,    Marina Michael, LUKE    Enclosure  CC:  No Recipients    If you would like to receive this communication electronically, please contact [email or web address] or [phone] to request more information on [facility]â„[employer] Network Link access.    For providers and/or their staff who would like to refer a patient to [facility], please contact us through our one-stop-shop provider referral line, [facility], at [phone].    If you feel you have received this communication in error or would no longer like to receive these types of communications, please e-mail [email or web address]

## 2018-12-11 NOTE — PROGRESS NOTES
Subjective:       Patient ID: Myron Noel is a 68 y.o. male.    Chief Complaint:  Memory Loss    History of Present Illness  Mr. Noel is a 68 year old male with complaints of memory loss.  He comes to appointment with his daughter.  Reports he has had a decline in his memory over the last 6 months.  Grandmother and 3 maternal aunts had dementia.  [facility]  He recently moved with his daughter.  He has difficulty with remembering medications.  He misplaces objects in the home.  Has progressively become worse in the last year.      Review of Systems  Review of Systems   Constitutional: Negative for appetite change.   HENT: Negative for hearing loss.    Eyes: Negative for visual disturbance.   Respiratory: Negative for shortness of breath.    Cardiovascular: Negative for chest pain.   Genitourinary: Negative.    Neurological: Negative for tremors.        Memory loss   Psychiatric/Behavioral: Negative for decreased concentration and dysphoric mood.       Objective:      Neurologic Exam     Mental Status   Oriented to country, city and area.   Oriented to year, month, date, day and season.   Follows 3 step commands.   Attention: normal. Concentration: normal.   Level of consciousness: alert  Knowledge: good and consistent with education.   Able to name object. Able to read. Unable to repeat (1 of 2 sentences). Able to write. Normal comprehension.     MOCA was administered-  Overall Score was 25/30   Normal>26  (see scanned test)  VS/Executive function-3/5 unable to place hands or numbers on clock; sentence repetition-1/2; verbal fluency- 12 words; Immediate recall 4/5; Delayed recall-3/5 (1 with cueing); Orientation-6/6.  Mr. Noel was able to participate in sensible, fluent conversation.  He answered all questions appropriately and correctly.  He was able to discuss several current events and his medical history in detail.     Cranial Nerves   Cranial nerves II through XII  intact.     Motor Exam   Muscle bulk: normal  Overall muscle tone: normal    Gait, Coordination, and Reflexes     Gait  Gait: non-neurologic    Coordination   Romberg: negative  Finger to nose coordination: normal  Heel to shin coordination: normal  Tandem walking coordination: abnormal    Tremor   Resting tremor: absent  Intention tremor: absent      Physical Exam   Constitutional: Vital signs are normal. He appears well-developed and well-nourished.   Neurological: He has an abnormal Tandem Gait Test. He has a normal Finger-Nose-Finger Test, a normal Heel to Hodge Test and a normal Romberg Test.       No imaging to review    Labs 12/07/18  HbA1c  6.5   CHOL  141 Triglycerides 161  HDL 26  LDL 82.8  Assessment:      Mild cognitive impairment with memory loss    Plan:       -Reviewed results of cognitive screen and answered questions.  -BP and HbA1c in target goal range for stroke prevention.\  -LDL >70  -Discussed normal aging vs MCI vs early dementia.  -Diagnostic work up- MRI, Vitamin B12 level, TSH, folate,   -Discussed safety issues related to MCI- medication management, cooking, managing finances, driving. No safety issues at this time. Discussed the use of medication trays due to multiple medications and no system to verify correct administration.  Living with daughter at this time.  -Discussed medications for cognitive enhancement- AChEIs and Namenda CR  Will discuss if progression  -Discussed NP testing  Defer at this time   -Will continue to follow  -Follow up in 3 months

## 2018-12-13 NOTE — TELEPHONE ENCOUNTER
Attempted to call patient regarding Dr. Sepulveda's suggestion to use over the counter fungal medication due to prescribed medication being too expensive. Patient did not answer. Will try again another time.

## 2019-01-10 ENCOUNTER — LAB VISIT (OUTPATIENT)
Dept: LAB | Facility: HOSPITAL | Age: 69
End: 2019-01-10
Attending: INTERNAL MEDICINE
Payer: MEDICARE

## 2019-01-10 DIAGNOSIS — N25.81 SECONDARY HYPERPARATHYROIDISM OF RENAL ORIGIN: ICD-10-CM

## 2019-01-10 DIAGNOSIS — R80.9 PROTEINURIA: ICD-10-CM

## 2019-01-10 DIAGNOSIS — M10.9 GOUT, UNSPECIFIED: ICD-10-CM

## 2019-01-10 DIAGNOSIS — R94.4 NONSPECIFIC ABNORMAL RESULTS OF KIDNEY FUNCTION STUDY: Primary | ICD-10-CM

## 2019-01-10 DIAGNOSIS — E87.20 ACIDOSIS: ICD-10-CM

## 2019-01-10 DIAGNOSIS — Z90.5 ACQUIRED ABSENCE OF KIDNEY: ICD-10-CM

## 2019-01-10 DIAGNOSIS — R60.9 EDEMA: ICD-10-CM

## 2019-01-10 DIAGNOSIS — N18.30 CHRONIC KIDNEY DISEASE, STAGE III (MODERATE): ICD-10-CM

## 2019-01-10 DIAGNOSIS — I10 ESSENTIAL HYPERTENSION, MALIGNANT: ICD-10-CM

## 2019-01-10 DIAGNOSIS — N18.2 CHRONIC KIDNEY DISEASE, STAGE II (MILD): ICD-10-CM

## 2019-01-10 DIAGNOSIS — E78.5 HYPERLIPEMIA: ICD-10-CM

## 2019-01-10 DIAGNOSIS — N17.9 ACUTE KIDNEY FAILURE, UNSPECIFIED: ICD-10-CM

## 2019-01-10 LAB
ALBUMIN SERPL BCP-MCNC: 4 G/DL
ANION GAP SERPL CALC-SCNC: 12 MMOL/L
BUN SERPL-MCNC: 39 MG/DL
CALCIUM SERPL-MCNC: 9.8 MG/DL
CHLORIDE SERPL-SCNC: 109 MMOL/L
CO2 SERPL-SCNC: 23 MMOL/L
CREAT SERPL-MCNC: 1.1 MG/DL
EST. GFR  (AFRICAN AMERICAN): >60 ML/MIN/1.73 M^2
EST. GFR  (NON AFRICAN AMERICAN): >60 ML/MIN/1.73 M^2
GLUCOSE SERPL-MCNC: 120 MG/DL
MAGNESIUM SERPL-MCNC: 1.8 MG/DL
PHOSPHATE SERPL-MCNC: 3 MG/DL
PHOSPHATE SERPL-MCNC: 3 MG/DL
POTASSIUM SERPL-SCNC: 4.1 MMOL/L
SODIUM SERPL-SCNC: 144 MMOL/L
URATE SERPL-MCNC: 5.7 MG/DL

## 2019-01-10 PROCEDURE — 80069 RENAL FUNCTION PANEL: CPT

## 2019-01-10 PROCEDURE — 83970 ASSAY OF PARATHORMONE: CPT

## 2019-01-10 PROCEDURE — 36415 COLL VENOUS BLD VENIPUNCTURE: CPT | Mod: PO

## 2019-01-10 PROCEDURE — 84550 ASSAY OF BLOOD/URIC ACID: CPT

## 2019-01-10 PROCEDURE — 83735 ASSAY OF MAGNESIUM: CPT

## 2019-01-11 LAB — PTH-INTACT SERPL-MCNC: 34 PG/ML

## 2019-03-03 ENCOUNTER — PATIENT MESSAGE (OUTPATIENT)
Dept: NEUROLOGY | Facility: CLINIC | Age: 69
End: 2019-03-03

## 2019-03-11 ENCOUNTER — TELEPHONE (OUTPATIENT)
Dept: OPHTHALMOLOGY | Facility: CLINIC | Age: 69
End: 2019-03-11

## 2019-03-11 ENCOUNTER — OFFICE VISIT (OUTPATIENT)
Dept: OPHTHALMOLOGY | Facility: CLINIC | Age: 69
End: 2019-03-11
Payer: MEDICARE

## 2019-03-11 VITALS — HEART RATE: 67 BPM | DIASTOLIC BLOOD PRESSURE: 85 MMHG | SYSTOLIC BLOOD PRESSURE: 154 MMHG

## 2019-03-11 DIAGNOSIS — H33.002 RHEGMATOGENOUS RETINAL DETACHMENT OF LEFT EYE: Primary | ICD-10-CM

## 2019-03-11 DIAGNOSIS — H35.22 PROLIFERATIVE VITREORETINOPATHY, LEFT: ICD-10-CM

## 2019-03-11 PROCEDURE — 92226 PR SPECIAL EYE EXAM, SUBSEQUENT: ICD-10-PCS | Mod: RT,S$GLB,, | Performed by: OPHTHALMOLOGY

## 2019-03-11 PROCEDURE — 92014 PR EYE EXAM, EST PATIENT,COMPREHESV: ICD-10-PCS | Mod: S$GLB,,, | Performed by: OPHTHALMOLOGY

## 2019-03-11 PROCEDURE — 92014 COMPRE OPH EXAM EST PT 1/>: CPT | Mod: S$GLB,,, | Performed by: OPHTHALMOLOGY

## 2019-03-11 PROCEDURE — 99999 PR PBB SHADOW E&M-EST. PATIENT-LVL III: ICD-10-PCS | Mod: PBBFAC,,, | Performed by: OPHTHALMOLOGY

## 2019-03-11 PROCEDURE — 99999 PR PBB SHADOW E&M-EST. PATIENT-LVL III: CPT | Mod: PBBFAC,,, | Performed by: OPHTHALMOLOGY

## 2019-03-11 PROCEDURE — 92226 PR SPECIAL EYE EXAM, SUBSEQUENT: CPT | Mod: LT,S$GLB,, | Performed by: OPHTHALMOLOGY

## 2019-03-11 NOTE — PROGRESS NOTES
HPI     3 mo f/u   DLS- 12/10/2018 Dr. Gibson     Pt states vision in OS is better but still very blurry can see up close better than distance.     Eye Med(s) - Artificial Tears - OU          OCT - OD ME  OS  Fovea flat  Inferior macula fluid - some improvement        A/P    1. RRD OS with multiple breaks    s/p 25g PPV/AFx/EL/C3F8 OS for RRD 6/20/28 8/13/18 - Recurrent inferior RRD with PVR OS    s/p /270 25g PPV/membrane stripping/EL/AFx/1000cs Katt oil OS for RRD with PVR 8/15/18    10/18 - inferior RD beneath oil   12/18 -stable    PLan 25g PPV/SO removal/inferior laser retinopexy (from midphery to ora)/AFx/C3F8 OS for RRD    Local MAC  LOC 45 min    Risks, benefits, and alternatives to treatment discussed in detail with the patient.  The patient voiced understanding and wished to proceed with the procedure        2. PCIOL OU    3. PVD OD    4. DM  No DR  BS/BP/chol control

## 2019-03-14 DIAGNOSIS — E11.9 TYPE 2 DIABETES MELLITUS WITHOUT COMPLICATION: ICD-10-CM

## 2019-03-19 NOTE — PRE-PROCEDURE INSTRUCTIONS
PreOp Instructions given TO PT'S DAUGHTER - MANPREET:   - Verbal medication information (what to hold and what to take)   - NPO guidelines   - Arrival place directions given;   - Bathing with antibacterial soap   - Don't wear any jewelry or bring any valuables AM of surgery   - No makeup or moisturizer to face   - No perfume/cologne, powder, lotions or aftershave   Pt. verbalized understanding.     Denies any  history of side effects or issues with anesthesia or sedation.

## 2019-03-20 ENCOUNTER — ANESTHESIA EVENT (OUTPATIENT)
Dept: SURGERY | Facility: HOSPITAL | Age: 69
End: 2019-03-20
Payer: MEDICARE

## 2019-03-20 ENCOUNTER — ANESTHESIA (OUTPATIENT)
Dept: SURGERY | Facility: HOSPITAL | Age: 69
End: 2019-03-20
Payer: MEDICARE

## 2019-03-20 ENCOUNTER — HOSPITAL ENCOUNTER (OUTPATIENT)
Facility: HOSPITAL | Age: 69
Discharge: HOME | End: 2019-03-20
Attending: OPHTHALMOLOGY | Admitting: OPHTHALMOLOGY
Payer: MEDICARE

## 2019-03-20 VITALS
BODY MASS INDEX: 42.21 KG/M2 | OXYGEN SATURATION: 99 % | HEIGHT: 69 IN | DIASTOLIC BLOOD PRESSURE: 58 MMHG | WEIGHT: 285 LBS | HEART RATE: 61 BPM | RESPIRATION RATE: 16 BRPM | SYSTOLIC BLOOD PRESSURE: 148 MMHG | TEMPERATURE: 99 F

## 2019-03-20 DIAGNOSIS — H33.022 RETINAL DETACHMENT OF LEFT EYE WITH MULTIPLE BREAKS: Primary | ICD-10-CM

## 2019-03-20 DIAGNOSIS — H33.002 RHEGMATOGENOUS RETINAL DETACHMENT OF LEFT EYE: ICD-10-CM

## 2019-03-20 DIAGNOSIS — H33.22 RETINAL DETACHMENT, LEFT: ICD-10-CM

## 2019-03-20 LAB
POCT GLUCOSE: 153 MG/DL (ref 70–110)
POCT GLUCOSE: 96 MG/DL (ref 70–110)

## 2019-03-20 PROCEDURE — 25000003 PHARM REV CODE 250: Performed by: OPHTHALMOLOGY

## 2019-03-20 PROCEDURE — 82962 GLUCOSE BLOOD TEST: CPT | Mod: 91 | Performed by: OPHTHALMOLOGY

## 2019-03-20 PROCEDURE — D9220A PRA ANESTHESIA: Mod: ANES,,, | Performed by: ANESTHESIOLOGY

## 2019-03-20 PROCEDURE — D9220A PRA ANESTHESIA: ICD-10-PCS | Mod: CRNA,,, | Performed by: NURSE ANESTHETIST, CERTIFIED REGISTERED

## 2019-03-20 PROCEDURE — 25000003 PHARM REV CODE 250: Performed by: NURSE ANESTHETIST, CERTIFIED REGISTERED

## 2019-03-20 PROCEDURE — 67108 REPAIR DETACHED RETINA: CPT | Mod: LT,,, | Performed by: OPHTHALMOLOGY

## 2019-03-20 PROCEDURE — 36000706: Performed by: OPHTHALMOLOGY

## 2019-03-20 PROCEDURE — 37000009 HC ANESTHESIA EA ADD 15 MINS: Performed by: OPHTHALMOLOGY

## 2019-03-20 PROCEDURE — C1784 OCULAR DEV, INTRAOP, DET RET: HCPCS | Performed by: OPHTHALMOLOGY

## 2019-03-20 PROCEDURE — S0020 INJECTION, BUPIVICAINE HYDRO: HCPCS | Performed by: OPHTHALMOLOGY

## 2019-03-20 PROCEDURE — 27600004 OPTIME MED/SURG SUP & DEVICES INTRAOCULAR LENS: Performed by: OPHTHALMOLOGY

## 2019-03-20 PROCEDURE — 27201423 OPTIME MED/SURG SUP & DEVICES STERILE SUPPLY: Performed by: OPHTHALMOLOGY

## 2019-03-20 PROCEDURE — D9220A PRA ANESTHESIA: Mod: CRNA,,, | Performed by: NURSE ANESTHETIST, CERTIFIED REGISTERED

## 2019-03-20 PROCEDURE — 63600175 PHARM REV CODE 636 W HCPCS: Performed by: NURSE ANESTHETIST, CERTIFIED REGISTERED

## 2019-03-20 PROCEDURE — 67108 PR REPAIR DETACH RETINA,W VITRECTOMY: ICD-10-PCS | Mod: LT,,, | Performed by: OPHTHALMOLOGY

## 2019-03-20 PROCEDURE — 82962 GLUCOSE BLOOD TEST: CPT | Performed by: OPHTHALMOLOGY

## 2019-03-20 PROCEDURE — 63600175 PHARM REV CODE 636 W HCPCS: Performed by: OPHTHALMOLOGY

## 2019-03-20 PROCEDURE — D9220A PRA ANESTHESIA: ICD-10-PCS | Mod: ANES,,, | Performed by: ANESTHESIOLOGY

## 2019-03-20 PROCEDURE — 71000015 HC POSTOP RECOV 1ST HR: Performed by: OPHTHALMOLOGY

## 2019-03-20 PROCEDURE — 71000016 HC POSTOP RECOV ADDL HR: Performed by: OPHTHALMOLOGY

## 2019-03-20 PROCEDURE — 36000707: Performed by: OPHTHALMOLOGY

## 2019-03-20 PROCEDURE — 37000008 HC ANESTHESIA 1ST 15 MINUTES: Performed by: OPHTHALMOLOGY

## 2019-03-20 RX ORDER — TETRACAINE HYDROCHLORIDE 5 MG/ML
1 SOLUTION OPHTHALMIC
Status: DISCONTINUED | OUTPATIENT
Start: 2019-03-20 | End: 2019-03-20 | Stop reason: HOSPADM

## 2019-03-20 RX ORDER — PHENYLEPHRINE HYDROCHLORIDE 25 MG/ML
SOLUTION/ DROPS OPHTHALMIC
Status: DISPENSED
Start: 2019-03-20 | End: 2019-03-21

## 2019-03-20 RX ORDER — TETRACAINE HYDROCHLORIDE 5 MG/ML
SOLUTION OPHTHALMIC
Status: DISPENSED
Start: 2019-03-20 | End: 2019-03-21

## 2019-03-20 RX ORDER — MOXIFLOXACIN 5 MG/ML
1 SOLUTION/ DROPS OPHTHALMIC
Status: DISCONTINUED | OUTPATIENT
Start: 2019-03-20 | End: 2019-03-20 | Stop reason: HOSPADM

## 2019-03-20 RX ORDER — VANCOMYCIN HYDROCHLORIDE 500 MG/10ML
INJECTION, POWDER, LYOPHILIZED, FOR SOLUTION INTRAVENOUS
Status: DISCONTINUED | OUTPATIENT
Start: 2019-03-20 | End: 2019-03-20 | Stop reason: HOSPADM

## 2019-03-20 RX ORDER — CYCLOPENTOLATE HYDROCHLORIDE 10 MG/ML
1 SOLUTION/ DROPS OPHTHALMIC
Status: DISCONTINUED | OUTPATIENT
Start: 2019-03-20 | End: 2019-03-20 | Stop reason: HOSPADM

## 2019-03-20 RX ORDER — LIDOCAINE HCL/PF 100 MG/5ML
SYRINGE (ML) INTRAVENOUS
Status: DISCONTINUED | OUTPATIENT
Start: 2019-03-20 | End: 2019-03-20

## 2019-03-20 RX ORDER — DEXAMETHASONE SODIUM PHOSPHATE 4 MG/ML
INJECTION, SOLUTION INTRA-ARTICULAR; INTRALESIONAL; INTRAMUSCULAR; INTRAVENOUS; SOFT TISSUE
Status: DISCONTINUED | OUTPATIENT
Start: 2019-03-20 | End: 2019-03-20 | Stop reason: HOSPADM

## 2019-03-20 RX ORDER — PROPOFOL 10 MG/ML
VIAL (ML) INTRAVENOUS
Status: DISCONTINUED | OUTPATIENT
Start: 2019-03-20 | End: 2019-03-20

## 2019-03-20 RX ORDER — MIDAZOLAM HYDROCHLORIDE 1 MG/ML
INJECTION, SOLUTION INTRAMUSCULAR; INTRAVENOUS
Status: DISCONTINUED | OUTPATIENT
Start: 2019-03-20 | End: 2019-03-20

## 2019-03-20 RX ORDER — DEXAMETHASONE SODIUM PHOSPHATE 4 MG/ML
INJECTION, SOLUTION INTRA-ARTICULAR; INTRALESIONAL; INTRAMUSCULAR; INTRAVENOUS; SOFT TISSUE
Status: DISCONTINUED
Start: 2019-03-20 | End: 2019-03-20 | Stop reason: HOSPADM

## 2019-03-20 RX ORDER — PHENYLEPHRINE HYDROCHLORIDE 25 MG/ML
1 SOLUTION/ DROPS OPHTHALMIC
Status: DISCONTINUED | OUTPATIENT
Start: 2019-03-20 | End: 2019-03-20 | Stop reason: HOSPADM

## 2019-03-20 RX ORDER — SODIUM CHLORIDE 9 MG/ML
INJECTION, SOLUTION INTRAVENOUS CONTINUOUS PRN
Status: DISCONTINUED | OUTPATIENT
Start: 2019-03-20 | End: 2019-03-20

## 2019-03-20 RX ORDER — PREDNISOLONE ACETATE 10 MG/ML
SUSPENSION/ DROPS OPHTHALMIC
Status: DISPENSED
Start: 2019-03-20 | End: 2019-03-21

## 2019-03-20 RX ORDER — OXYCODONE AND ACETAMINOPHEN 5; 325 MG/1; MG/1
1 TABLET ORAL EVERY 6 HOURS PRN
Qty: 12 TABLET | Refills: 0 | Status: SHIPPED | OUTPATIENT
Start: 2019-03-20 | End: 2019-04-04 | Stop reason: ALTCHOICE

## 2019-03-20 RX ORDER — BUPIVACAINE HYDROCHLORIDE 7.5 MG/ML
INJECTION, SOLUTION EPIDURAL; RETROBULBAR
Status: DISCONTINUED
Start: 2019-03-20 | End: 2019-03-20 | Stop reason: HOSPADM

## 2019-03-20 RX ORDER — TROPICAMIDE 10 MG/ML
1 SOLUTION/ DROPS OPHTHALMIC
Status: DISCONTINUED | OUTPATIENT
Start: 2019-03-20 | End: 2019-03-20 | Stop reason: HOSPADM

## 2019-03-20 RX ORDER — BUPIVACAINE HYDROCHLORIDE 7.5 MG/ML
INJECTION, SOLUTION EPIDURAL; RETROBULBAR
Status: DISCONTINUED | OUTPATIENT
Start: 2019-03-20 | End: 2019-03-20 | Stop reason: HOSPADM

## 2019-03-20 RX ORDER — FENTANYL CITRATE 50 UG/ML
INJECTION, SOLUTION INTRAMUSCULAR; INTRAVENOUS
Status: DISCONTINUED | OUTPATIENT
Start: 2019-03-20 | End: 2019-03-20

## 2019-03-20 RX ORDER — LIDOCAINE HYDROCHLORIDE 20 MG/ML
INJECTION, SOLUTION EPIDURAL; INFILTRATION; INTRACAUDAL; PERINEURAL
Status: DISCONTINUED
Start: 2019-03-20 | End: 2019-03-20 | Stop reason: HOSPADM

## 2019-03-20 RX ORDER — HYDROCODONE BITARTRATE AND ACETAMINOPHEN 5; 325 MG/1; MG/1
1 TABLET ORAL EVERY 4 HOURS PRN
Status: DISCONTINUED | OUTPATIENT
Start: 2019-03-20 | End: 2019-03-20 | Stop reason: HOSPADM

## 2019-03-20 RX ORDER — NEOMYCIN SULFATE, POLYMYXIN B SULFATE, AND DEXAMETHASONE 3.5; 10000; 1 MG/G; [USP'U]/G; MG/G
OINTMENT OPHTHALMIC
Status: DISCONTINUED
Start: 2019-03-20 | End: 2019-03-20 | Stop reason: HOSPADM

## 2019-03-20 RX ORDER — ACETAMINOPHEN 325 MG/1
650 TABLET ORAL EVERY 4 HOURS PRN
Status: DISCONTINUED | OUTPATIENT
Start: 2019-03-20 | End: 2019-03-20 | Stop reason: HOSPADM

## 2019-03-20 RX ORDER — PREDNISOLONE ACETATE 10 MG/ML
1 SUSPENSION/ DROPS OPHTHALMIC
Status: DISCONTINUED | OUTPATIENT
Start: 2019-03-20 | End: 2019-03-20 | Stop reason: HOSPADM

## 2019-03-20 RX ORDER — EPINEPHRINE 1 MG/ML
INJECTION, SOLUTION INTRACARDIAC; INTRAMUSCULAR; INTRAVENOUS; SUBCUTANEOUS
Status: DISCONTINUED
Start: 2019-03-20 | End: 2019-03-20 | Stop reason: HOSPADM

## 2019-03-20 RX ORDER — CYCLOPENTOLATE HYDROCHLORIDE 10 MG/ML
SOLUTION/ DROPS OPHTHALMIC
Status: DISPENSED
Start: 2019-03-20 | End: 2019-03-21

## 2019-03-20 RX ORDER — FENTANYL CITRATE 50 UG/ML
25 INJECTION, SOLUTION INTRAMUSCULAR; INTRAVENOUS EVERY 5 MIN PRN
Status: DISCONTINUED | OUTPATIENT
Start: 2019-03-20 | End: 2019-03-20 | Stop reason: HOSPADM

## 2019-03-20 RX ORDER — VANCOMYCIN HYDROCHLORIDE 500 MG/10ML
INJECTION, POWDER, LYOPHILIZED, FOR SOLUTION INTRAVENOUS
Status: DISCONTINUED
Start: 2019-03-20 | End: 2019-03-20 | Stop reason: HOSPADM

## 2019-03-20 RX ORDER — NEOMYCIN SULFATE, POLYMYXIN B SULFATE, AND DEXAMETHASONE 3.5; 10000; 1 MG/G; [USP'U]/G; MG/G
OINTMENT OPHTHALMIC
Status: DISCONTINUED | OUTPATIENT
Start: 2019-03-20 | End: 2019-03-20 | Stop reason: HOSPADM

## 2019-03-20 RX ORDER — LIDOCAINE HYDROCHLORIDE 20 MG/ML
INJECTION, SOLUTION EPIDURAL; INFILTRATION; INTRACAUDAL; PERINEURAL
Status: DISCONTINUED | OUTPATIENT
Start: 2019-03-20 | End: 2019-03-20 | Stop reason: HOSPADM

## 2019-03-20 RX ORDER — SODIUM CHLORIDE 0.9 % (FLUSH) 0.9 %
3 SYRINGE (ML) INJECTION
Status: DISCONTINUED | OUTPATIENT
Start: 2019-03-20 | End: 2019-03-20 | Stop reason: HOSPADM

## 2019-03-20 RX ORDER — ONDANSETRON 8 MG/1
8 TABLET, ORALLY DISINTEGRATING ORAL EVERY 8 HOURS PRN
Status: DISCONTINUED | OUTPATIENT
Start: 2019-03-20 | End: 2019-03-20 | Stop reason: HOSPADM

## 2019-03-20 RX ORDER — ONDANSETRON 4 MG/1
4 TABLET, FILM COATED ORAL EVERY 8 HOURS PRN
Qty: 12 TABLET | Refills: 0 | Status: ON HOLD | OUTPATIENT
Start: 2019-03-20 | End: 2019-05-07

## 2019-03-20 RX ORDER — TROPICAMIDE 10 MG/ML
SOLUTION/ DROPS OPHTHALMIC
Status: DISPENSED
Start: 2019-03-20 | End: 2019-03-21

## 2019-03-20 RX ORDER — EPINEPHRINE 1 MG/ML
INJECTION, SOLUTION INTRACARDIAC; INTRAMUSCULAR; INTRAVENOUS; SUBCUTANEOUS
Status: DISCONTINUED | OUTPATIENT
Start: 2019-03-20 | End: 2019-03-20 | Stop reason: HOSPADM

## 2019-03-20 RX ADMIN — HOMATROPINE HYDROBROMIDE 1 DROP: 50 SOLUTION OPHTHALMIC at 02:03

## 2019-03-20 RX ADMIN — PHENYLEPHRINE HYDROCHLORIDE 1 DROP: 25 SOLUTION/ DROPS OPHTHALMIC at 02:03

## 2019-03-20 RX ADMIN — PROPOFOL 50 MG: 10 INJECTION, EMULSION INTRAVENOUS at 04:03

## 2019-03-20 RX ADMIN — FENTANYL CITRATE 25 MCG: 50 INJECTION, SOLUTION INTRAMUSCULAR; INTRAVENOUS at 04:03

## 2019-03-20 RX ADMIN — CYCLOPENTOLATE HYDROCHLORIDE 1 DROP: 10 SOLUTION/ DROPS OPHTHALMIC at 02:03

## 2019-03-20 RX ADMIN — MIDAZOLAM HYDROCHLORIDE 2 MG: 1 INJECTION, SOLUTION INTRAMUSCULAR; INTRAVENOUS at 04:03

## 2019-03-20 RX ADMIN — PREDNISOLONE ACETATE 1 DROP: 10 SUSPENSION/ DROPS OPHTHALMIC at 02:03

## 2019-03-20 RX ADMIN — TROPICAMIDE 1 DROP: 10 SOLUTION/ DROPS OPHTHALMIC at 02:03

## 2019-03-20 RX ADMIN — MOXIFLOXACIN 1 DROP: 5 SOLUTION/ DROPS OPHTHALMIC at 02:03

## 2019-03-20 RX ADMIN — LIDOCAINE HYDROCHLORIDE 30 MG: 20 INJECTION, SOLUTION INTRAVENOUS at 04:03

## 2019-03-20 RX ADMIN — SODIUM CHLORIDE: 0.9 INJECTION, SOLUTION INTRAVENOUS at 03:03

## 2019-03-20 RX ADMIN — TETRACAINE HYDROCHLORIDE 1 DROP: 5 SOLUTION OPHTHALMIC at 02:03

## 2019-03-20 NOTE — ANESTHESIA PREPROCEDURE EVALUATION
03/20/2019  Myron Noel is a 68 y.o., male.  Pre-operative evaluation for Procedure(s) (LRB):  REPAIR, RETINAL DETACHMENT, WITH VITRECTOMY (Left)    Myron Noel is a 68 y.o. male     LDA:     Prev airway:     Drips:     Patient Active Problem List   Diagnosis    Solitary kidney, acquired    Hypertension associated with diabetes    Chronic gout    Knee osteoarthritis    Dysphagia    Obesity, Class III, BMI 40-49.9 (morbid obesity)    Type 2 diabetes mellitus with microalbuminuria, without long-term current use of insulin    Hyperlipidemia associated with type 2 diabetes mellitus    NAFLD (nonalcoholic fatty liver disease)    Hypoxia    Sarcoidosis of lung with sarcoidosis of lymph nodes    Sleep apnea, obstructive    Chronic sinus complaints    CKD (chronic kidney disease) stage 3, GFR 30-59 ml/min    Microalbuminuria due to type 2 diabetes mellitus    Retinal detachment of left eye with multiple breaks    Posterior vitreous detachment, bilateral    Rhegmatogenous retinal detachment of left eye    Proliferative vitreoretinopathy, left    Retinal detachment, left       Review of patient's allergies indicates:   Allergen Reactions    Venom-honey bee Other (See Comments)     Passed out    Metformin      GI distress        No current facility-administered medications on file prior to encounter.      Current Outpatient Medications on File Prior to Encounter   Medication Sig Dispense Refill    allopurinol (ZYLOPRIM) 300 MG tablet TAKE 1 TABLET BY MOUTH TWICE A  tablet 3    atorvastatin (LIPITOR) 80 MG tablet Take 80 mg by mouth once daily.  3    fenofibrate (TRICOR) 48 MG tablet Take 1 tablet (48 mg total) by mouth once daily. 90 tablet 3    furosemide (LASIX) 20 MG tablet Take 20 mg by mouth once daily.  2    glipiZIDE (GLUCOTROL) 10 MG tablet TAKE 1 TABLET BY MOUTH TWICE A DAY  BEFORE MEALS (Patient taking differently: TAKE 1 TABLET  A DAY BEFORE MEALS) 180 tablet 3    lisinopril (PRINIVIL,ZESTRIL) 20 MG tablet TAKE 1 TABLET (20 MG TOTAL) BY MOUTH ONCE DAILY. 90 tablet 3    pantoprazole (PROTONIX) 40 MG tablet Take 40 mg by mouth once daily.      ciclopirox (PENLAC) 8 % Soln Apply topically nightly. 6.6 mL 11       Past Surgical History:   Procedure Laterality Date    APPENDECTOMY      CATARACT EXTRACTION  08/06/2014    od    CATARACT EXTRACTION W/  INTRAOCULAR LENS IMPLANT Left 11/5/14    ESOPHAGOGASTRODUODENOSCOPY (EGD) N/A 6/23/2015    Per[facility] ENDO    EXCISION-PTERYGIUM Left 10/15/2014    [facility]    EYE SURGERY      NEPHRECTOMY      phaco/pciol Left 11/5/2014    Performed by [facility]    phaco/pciol  Right 8/6/2014    Performed by [facility]    REPAIR, RETINAL DETACHMENT, WITH VITRECTOMY Left 6/20/2018    [facility]    SCLERAL BUCKLING Left 8/15/2018    Performed by [facility] at [facility] OR 1ST FLR    TONSILLECTOMY                       Anesthesia Evaluation    I have reviewed the Patient Summary Reports.    I have reviewed the Nursing Notes.   I have reviewed the Medications.     Review of Systems  Anesthesia Hx:  No problems with previous Anesthesia    Social:  Non-Smoker    Hematology/Oncology:  Hematology Normal   Oncology Normal     EENT/Dental:EENT/Dental Normal   Pulmonary:   Sleep Apnea, CPAP    Renal/:   nephrectomy   Hepatic/GI:   Liver Disease, Fatty liver   Musculoskeletal:   Arthritis  gout   Neurological:  Neurology Normal    Endocrine:   Diabetes, poorly controlled, type 2    Dermatological:  Skin Normal    Psych:  Psychiatric Normal           Physical Exam  General:  Obesity, Well nourished, Morbid Obesity    Airway/Jaw/Neck:  Airway Findings: Mouth Opening: Normal  Tongue: Normal  General Airway Assessment: Adult  Oropharynx Findings:  Mallampati: I  TM Distance: Normal, at least 6 cm      Dental:  Dental Findings: In tact, Upper partial dentures, Periodontal disease, Severe   Chest/Lungs:  Chest/Lungs Findings: Clear to auscultation     Heart/Vascular:  Heart Findings: Rate: Normal  Rhythm: Regular Rhythm  Sounds: Normal        Mental Status:  Mental Status Findings:  Cooperative, Alert and Oriented         Anesthesia Plan  Type of Anesthesia, risks & benefits discussed:  Anesthesia Type:  MAC  Patient's Preference: MAC  Intra-op Monitoring Plan: standard ASA monitors  Intra-op Monitoring Plan Comments:   Post Op Pain Control Plan:   Post Op Pain Control Plan Comments: IV/PO opioids PRN  Induction:   IV  Beta Blocker:  Patient is not currently on a Beta-Blocker (No further documentation required).       Informed Consent: Patient understands risks and agrees with Anesthesia plan.  Questions answered. Anesthesia consent signed with patient.  ASA Score: 3     Day of Surgery Review of History & Physical:    H&P update referred to the surgeon.     Anesthesia Plan Notes: The patient's PMH was reviewed and PE was performed  Plan for MAC        Ready For Surgery From Anesthesia Perspective.

## 2019-03-20 NOTE — INTERVAL H&P NOTE
Interim H&P Update:  Pt presents for surgery; history and physical was reviewed and no changes have been made. Pt is awake, alert, and has no current complaints or other problems. H&P is less than 30 days old and is accurate and patient is ready to proceed with the above surgery. Will proceed to the OR as scheduled.    No interval change

## 2019-03-20 NOTE — BRIEF OP NOTE
Pre-Op Dx: RRD with PVR OS    Post Op Dx: same    Procedure Performed: 25g PPV/membrane stripping/AFx/EL/C3F8 14% OS  EL #944, P 200mW, D 0.1s    Attending Surgeon: Arthur    Assistant Surgeon: Oliva    Anesthesia: Local/Mac, retrobulbar injection of 4.0cc mixture 0.75%Marcaine, 2% Xylocaine    Estimated blood loss: Minimal    Complication: None    Specimen: None    Disposition: Stable to recovery    Findings/Outcome: inferior RD beneath oil.  Some anterior PVR band.  Relaxing retinectomy with diathermy created on buckle edge.  Flattened nicely under air.  Inferior retinopexy to inferior arcade applied    Date of Discharge: 3/20/19    Discharge Disposition: stable to recovery then home    F/U: tomorrow

## 2019-03-20 NOTE — H&P
Pre-Operative History & Physical  Ophthalmology      SUBJECTIVE:     History of Present Illness:  Patient is a 68 y.o. 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    MEDICATIONS:   No medications prior to admission.       ALLERGIES:   Review of patient's allergies indicates:   Allergen Reactions    Venom-honey bee Other (See Comments)     Passed out    Metformin      GI distress       PAST MEDICAL HISTORY:   Past Medical History:   Diagnosis Date    Cataract     ou done//    Chronic kidney disease 2001    nephrectomy for obstructive stones    Corneal abrasion, left     With leaf 40 yrs ago    Diabetes mellitus     Gout, chronic     Hypertension     Knee osteoarthritis 12/20/2013    Retinal detachment of left eye with multiple breaks 6/18/2018     PAST SURGICAL HISTORY:   Past Surgical History:   Procedure Laterality Date    APPENDECTOMY      CATARACT EXTRACTION  08/06/2014    od    CATARACT EXTRACTION W/  INTRAOCULAR LENS IMPLANT Left 11/5/14    ESOPHAGOGASTRODUODENOSCOPY (EGD) N/A 6/23/2015    [facility]O    EXCISION-PTERYGIUM Left 10/15/2014    Performed by [facility] at [facility] OR    EYE SURGERY      NEPHRECTOMY      phaco/pciol Left 11/5/2014    [facility]    phaco/pciol  Right 8/6/2014    [facility] by [facility] at [facility] OR    REPAIR, RETINAL DETACHMENT, WITH VITRECTOMY Left 6/20/2018    [facility]    SCLERAL BUCKLING Left 8/15/2018    [facility] at [facility]    TONSILLECTOMY       PAST FAMILY HISTORY:   Family History   Problem Relation Age of Onset    Heart defect Father     Alzheimer's disease Maternal Aunt     Diabetes Maternal Grandmother     Melanoma Neg Hx     Psoriasis Neg Hx     Lupus Neg Hx     Eczema Neg Hx      SOCIAL HISTORY:   Social History     Tobacco  Use    Smoking status: Former Smoker     Types: Pipe    Smokeless tobacco: Never Used   Substance Use Topics    Alcohol use: No     Alcohol/week: 0.0 oz    Drug use: No        MENTAL STATUS: Alert    REVIEW OF SYSTEMS: Negative    OBJECTIVE:     Vital Signs (Most Recent)       Physical Exam:  General: NAD  HEENT: Atraumatic  Lungs: Adequate respirations, LCTAB  Heart: RRR, No murmur  Abdomen: Soft NT    ASSESSMENT/PLAN:     Patient is a 68 y.o. 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     - Plan for surgical correction PLan 25g PPV/SO removal/inferior laser retinopexy (from midphery to ora)/AFx/C3F8 OS for RRD     Local MAC  LOC 45 min   - Risks/benefits/alternatives of the procedure including, but not limited to scarring, bleeding, infection, loss or decreased vision, and/or need for possible repeat surgery discussed with the patient and family.   - Informed consent obtained prior to surgery and the patient/family voiced good understanding.    Musa Baptiste  3/20/2019  5:42 AM

## 2019-03-20 NOTE — TRANSFER OF CARE
"Anesthesia Transfer of Care Note    Patient: Myron Noel    Procedure(s) Performed: Procedure(s) (LRB):  REPAIR, RETINAL DETACHMENT, WITH VITRECTOMY (Left)    Patient location: [facility]    Anesthesia Type: MAC    Transport from OR: Transported from OR on room air with adequate spontaneous ventilation    Post pain: adequate analgesia    Post assessment: no apparent anesthetic complications and tolerated procedure well    Post vital signs: stable    Level of consciousness: awake, alert and oriented    Nausea/Vomiting: no nausea/vomiting    Complications: none    Transfer of care protocol was followed      Last vitals:   Visit Vitals  /79 (BP Location: Right arm, Patient Position: Lying)   Pulse 71   Temp 37.5 °C (99.5 °F) (Temporal)   Resp 20   Ht 5' 9" (1.753 m)   Wt 129.3 kg (285 lb)   SpO2 98%   BMI 42.09 kg/m²     "

## 2019-03-20 NOTE — ANESTHESIA POSTPROCEDURE EVALUATION
"Anesthesia Post Evaluation    Patient: Myron Noel    Procedure(s) Performed: Procedure(s) (LRB):  REPAIR, RETINAL DETACHMENT, WITH VITRECTOMY (Left)    Final Anesthesia Type: MAC  Patient location during evaluation: PACU  Patient participation: Yes- Able to Participate  Level of consciousness: awake and alert  Post-procedure vital signs: reviewed and stable  Pain management: adequate  Airway patency: patent  PONV status at discharge: No PONV  Anesthetic complications: no      Cardiovascular status: blood pressure returned to baseline  Respiratory status: unassisted, spontaneous ventilation and room air  Hydration status: euvolemic  Follow-up not needed.        Visit Vitals  BP (!) 148/58   Pulse 61   Temp 37.2 °C (99 °F) (Temporal)   Resp 16   Ht 5' 9" (1.753 m)   Wt 129.3 kg (285 lb)   SpO2 99%   BMI 42.09 kg/m²       Pain/Yousif Score: No Data Recorded      "

## 2019-03-21 ENCOUNTER — OFFICE VISIT (OUTPATIENT)
Dept: OPHTHALMOLOGY | Facility: CLINIC | Age: 69
End: 2019-03-21
Payer: MEDICARE

## 2019-03-21 DIAGNOSIS — H35.22 PROLIFERATIVE VITREORETINOPATHY, LEFT: ICD-10-CM

## 2019-03-21 DIAGNOSIS — H33.022 RETINAL DETACHMENT OF LEFT EYE WITH MULTIPLE BREAKS: Primary | ICD-10-CM

## 2019-03-21 PROCEDURE — 99024 PR POST-OP FOLLOW-UP VISIT: ICD-10-PCS | Mod: S$GLB,,, | Performed by: OPHTHALMOLOGY

## 2019-03-21 PROCEDURE — 99024 POSTOP FOLLOW-UP VISIT: CPT | Mod: S$GLB,,, | Performed by: OPHTHALMOLOGY

## 2019-03-21 PROCEDURE — 99999 PR PBB SHADOW E&M-EST. PATIENT-LVL II: CPT | Mod: PBBFAC,,, | Performed by: OPHTHALMOLOGY

## 2019-03-21 PROCEDURE — 99999 PR PBB SHADOW E&M-EST. PATIENT-LVL II: ICD-10-PCS | Mod: PBBFAC,,, | Performed by: OPHTHALMOLOGY

## 2019-03-21 NOTE — PROGRESS NOTES
HPI     Post-op Evaluation      Additional comments: 1 day check              Comments     1 day post op- Slight pain last night and this morning which was relieved with tylenol      HPI     3 mo f/u   DLS- 12/10/2018 Dr. Gibson     Pt states vision in OS is better but still very blurry can see up close better than distance.     Eye Med(s) - Artificial Tears - OU          OCT - OD ME  OS  Fovea flat  Inferior macula fluid - some improvement        A/P    1. RRD OS with multiple breaks    s/p 25g PPV/AFx/EL/C3F8 OS for RRD 6/20/28 8/13/18 - Recurrent inferior RRD with PVR OS    s/p /270 25g PPV/membrane stripping/EL/AFx/1000cs Katt oil OS for RRD with PVR 8/15/18    10/18 - inferior RD beneath oil   12/18 -stable    s/p 25g PPV/SO removal/inferior laser retinopexy (from midphery to ora)/AFx/C3F8 OS for RRD    Doing well, good IOP  Start gtts QID, ointment/shield QHS    Side sleep    1 week    2. PCIOL OU    3. PVD OD    4. DM  No DR  BS/BP/chol control

## 2019-03-21 NOTE — OP NOTE
DATE OF PROCEDURE:  03/20/2019.    PREOPERATIVE DIAGNOSIS:  Rhegmatogenous retinal detachment with proliferative   vitreal retinopathy to the left eye.    POSTOPERATIVE DIAGNOSIS:  Rhegmatogenous retinal detachment with proliferative   vitreal retinopathy to the left eye.    PROCEDURES PERFORMED:  A 25 gauge pars plana vitrectomy with membrane stripping,   air-fluid exchange, and endolaser C3F8 14% to the left eye.    ENDOLASER PARAMETERS:  Number of spots 944, power 200 milliwatts, and duration   0.1 seconds.    ATTENDING SURGEON:  IRA Gibson M.D.    ASSISTANT SURGEON:  Vadim Baptiste, fellow.    ANESTHESIA:  Local monitored anesthesia care with a retrobulbar injection of 4.0   mL mixture of 0.75% Marcaine and 2% Xylocaine.    ESTIMATED BLOOD LOSS:  Minimal.    COMPLICATIONS:  None.    DISPOSITION:  Stable to Recovery.    INDICATIONS FOR SURGERY:  This is a 68-year-old male who is status post multiple   prior vitrectomies and scleral buckle placement for recurrent rhegmatogenous   retinal detachment with proliferative vitreal retinopathy.  The patient had   stabilized with the silicone oil, but had a recurrent inferior detachment to the   mid periphery due to some thinning of the retina, some PVR stranding, and   inferior breaks posterior to the buckle.  Decision was made to take the patient   back to surgery to remove the oil and stabilize the retina going forward to   prevent further vision loss.  Risks, benefits, and alternatives of surgery were   discussed in detail with risks including loss of vision, loss of eye, recurrent   retinal detachment, infection, hemorrhage, lens dislocation, glaucoma, hypotony,   ptosis, and diplopia.  The patient voiced and wished to proceed with the   procedure.    DESCRIPTION OF PROCEDURE:  After proper informed consent was obtained, the   patient was brought back to the Operative Suite at [facility] where   MAC anesthesia was induced.  Retrobulbar injection  was provided as above without   complication.  The patient was prepped and draped in a normal sterile fashion   for ophthalmic surgery.  Lid speculum was placed in the left eye.  A standard   three-port 25 gauge pars plana vitrectomy set up with the infusion cannula was   inserted 3.5 mm posterior to the limbus.  The infusion cannula was turned on   only after it was observed to be free and clear of all underlying retinal   tissue.  Supranasal and supratemporal trocars were also placed 3.5 mm posterior   to the limbus.  The vitrector and light pipe were introduced in the vitreous   cavity and a posterior segment evaluation revealed stable inferior detachment up   to the inferotemporal arcade.  Significant areas of thinning were noted on the   buckle edge.  Diathermy was used to demarcate these areas and then a posterior   retinotomy was used to allow for drainage site.  Under air, the retina flattened   nicely on the buckle edge, although there were significant areas of thinning   with RPE atrophy anteriorly from prior laser treatment.  Endolaser was applied   in a barrier fashion around the retinotomy as well as complete filling from the   inferotemporal arcade anteriorly to the buckle and to the ora.  Overall, the   retinal anatomy was significantly improved following these maneuvers.    Supranasal trocar was removed, and not leaking after gentle massage, a 14% C3F8   gas mixture was created and approximately 40 mL were cycled through the eye.    The supratemporal and infusion trocars were removed, not leaking after gentle   massage, and the eye was normal pressure via palpation.  Subconjunctival   injections of vancomycin and Decadron were given to the patient.  The drapes   were removed from the patient.  He was washed free of Betadine prep solution.    Maxitrol ointment was placed in the left eye.  The eye was patch shielded.  MAC   anesthesia was reversed.  He was brought to the Recovery Room in stable    condition tolerating the procedure well.  Dr. Gibson was present for the   entire case.        XIOMARA/LUIS  dd: 03/20/2019 16:45:12 (CDT)  td: 03/20/2019 18:58:41 (CDT)  Doc ID   #[id number]  Job ID #[id number]    CC:

## 2019-03-26 ENCOUNTER — HOSPITAL ENCOUNTER (OUTPATIENT)
Dept: RADIOLOGY | Facility: HOSPITAL | Age: 69
Discharge: HOME | End: 2019-03-26
Attending: NURSE PRACTITIONER
Payer: MEDICARE

## 2019-03-26 DIAGNOSIS — G93.40 ENCEPHALOPATHY: ICD-10-CM

## 2019-03-26 PROCEDURE — 70551 MRI BRAIN STEM W/O DYE: CPT | Mod: 26,,, | Performed by: RADIOLOGY

## 2019-03-26 PROCEDURE — 70551 MRI BRAIN STEM W/O DYE: CPT | Mod: TC

## 2019-03-26 PROCEDURE — 70551 MRI BRAIN WITHOUT CONTRAST: ICD-10-PCS | Mod: 26,,, | Performed by: RADIOLOGY

## 2019-04-01 ENCOUNTER — HOSPITAL ENCOUNTER (EMERGENCY)
Facility: HOSPITAL | Age: 69
Discharge: HOME | End: 2019-04-01
Attending: EMERGENCY MEDICINE
Payer: MEDICARE

## 2019-04-01 ENCOUNTER — NURSE TRIAGE (OUTPATIENT)
Dept: ADMINISTRATIVE | Facility: CLINIC | Age: 69
End: 2019-04-01

## 2019-04-01 VITALS
WEIGHT: 284 LBS | OXYGEN SATURATION: 95 % | SYSTOLIC BLOOD PRESSURE: 142 MMHG | TEMPERATURE: 98 F | HEART RATE: 71 BPM | BODY MASS INDEX: 42.06 KG/M2 | HEIGHT: 69 IN | DIASTOLIC BLOOD PRESSURE: 69 MMHG | RESPIRATION RATE: 20 BRPM

## 2019-04-01 DIAGNOSIS — M54.41 ACUTE RIGHT-SIDED LOW BACK PAIN WITH RIGHT-SIDED SCIATICA: Primary | ICD-10-CM

## 2019-04-01 PROCEDURE — 25000003 PHARM REV CODE 250: Performed by: PHYSICIAN ASSISTANT

## 2019-04-01 PROCEDURE — 99284 EMERGENCY DEPT VISIT MOD MDM: CPT

## 2019-04-01 RX ORDER — METHOCARBAMOL 500 MG/1
500 TABLET, FILM COATED ORAL 2 TIMES DAILY PRN
Qty: 14 TABLET | Refills: 0 | Status: SHIPPED | OUTPATIENT
Start: 2019-04-01 | End: 2019-04-04 | Stop reason: SDUPTHER

## 2019-04-01 RX ORDER — HYDROCODONE BITARTRATE AND ACETAMINOPHEN 5; 325 MG/1; MG/1
1 TABLET ORAL
Status: COMPLETED | OUTPATIENT
Start: 2019-04-01 | End: 2019-04-01

## 2019-04-01 RX ORDER — LIDOCAINE 50 MG/G
1 PATCH TOPICAL DAILY
Qty: 15 PATCH | Refills: 0 | Status: SHIPPED | OUTPATIENT
Start: 2019-04-01 | End: 2019-04-01 | Stop reason: SDUPTHER

## 2019-04-01 RX ORDER — LIDOCAINE 50 MG/G
1 PATCH TOPICAL DAILY
Qty: 15 PATCH | Refills: 0 | Status: SHIPPED | OUTPATIENT
Start: 2019-04-01 | End: 2019-04-04 | Stop reason: SDUPTHER

## 2019-04-01 RX ORDER — LIDOCAINE 50 MG/G
1 PATCH TOPICAL
Status: DISCONTINUED | OUTPATIENT
Start: 2019-04-01 | End: 2019-04-01 | Stop reason: HOSPADM

## 2019-04-01 RX ADMIN — LIDOCAINE 1 PATCH: 50 PATCH TOPICAL at 03:04

## 2019-04-01 RX ADMIN — HYDROCODONE BITARTRATE AND ACETAMINOPHEN 1 TABLET: 5; 325 TABLET ORAL at 03:04

## 2019-04-01 NOTE — ED PROVIDER NOTES
Encounter Date: 4/1/2019    SCRIBE #1 NOTE: IOdette, am scribing for, and in the presence of, Erin Pickard PA-C.       History     Chief Complaint   Patient presents with    Back Pain     rt. buttocks radiates rt. leg        Time seen by provider: 3:27 PM on 04/01/2019    Myron Noel is a 68 y.o. male with a PMHx of HTN, CKD, and DM who presents to the ED sudden onset of constant right lower back pain that began 4-5 days ago. The pain radiates down his right leg. He currently rates his pain a 5/10. Pt doesn't recall any injury. He endorses intermittent numbness and tingling in his right leg. Pt has taken Ibuprofen and applied heat and Lidocaine patches. The patient denies burning urination, urinary incontinence, bowel incontinence, or any other symptoms at this time. No pertinent PSHx noted. Pt is a former smoker. Drug allergies includes Venom- honey bee and Metformin.    The history is provided by the patient.     Review of patient's allergies indicates:   Allergen Reactions    Venom-honey bee Other (See Comments)     Passed out    Metformin      GI distress     Past Medical History:   Diagnosis Date    Cataract     ou done//    Chronic kidney disease 2001    nephrectomy for obstructive stones    Corneal abrasion, left     With leaf 40 yrs ago    Diabetes mellitus     Gout, chronic     Hypertension     Knee osteoarthritis 12/20/2013    Retinal detachment of left eye with multiple breaks 6/18/2018     Past Surgical History:   Procedure Laterality Date    APPENDECTOMY      CATARACT EXTRACTION  08/06/2014    od    CATARACT EXTRACTION W/  INTRAOCULAR LENS IMPLANT Left 11/5/14    ESOPHAGOGASTRODUODENOSCOPY (EGD) N/A 6/23/2015    [facility] ENDO    EXCISION-PTERYGIUM Left 10/15/2014    [facility] OR    EYE SURGERY      NEPHRECTOMY      phaco/pciol Left 11/5/2014    [facility] at [facility]  OR    phaco/pciol  Right 8/6/2014    [facility] OR    REPAIR, RETINAL DETACHMENT, WITH VITRECTOMY Left 3/20/2019    [facility] at [facility] 1ST [facility]LR    REPAIR, RETINAL DETACHMENT, WITH VITRECTOMY Left 6/20/2018    Performed by [facility] at [facility] OR [facility]    SCLERAL BUCKLING Left 8/15/2018    Performed by IRA Gbison MD at [facility] OR 1ST [facility]LR    TONSILLECTOMY       Family History   Problem Relation Age of Onset    Heart defect Father     Alzheimer's disease Maternal Aunt     Diabetes Maternal Grandmother     Melanoma Neg Hx     Psoriasis Neg Hx     Lupus Neg Hx     Eczema Neg Hx      Social History     Tobacco Use    Smoking status: Former Smoker     Types: Pipe    Smokeless tobacco: Never Used   Substance Use Topics    Alcohol use: No     Alcohol/week: 0.0 oz    Drug use: No     Review of Systems   Constitutional: Negative for activity change, appetite change, chills and fever.   HENT: Negative for congestion, rhinorrhea and sore throat.    Eyes: Negative for redness and visual disturbance.   Respiratory: Negative for cough, chest tightness and shortness of breath.    Cardiovascular: Negative for chest pain.   Gastrointestinal: Negative for abdominal pain, diarrhea, nausea and vomiting.        Negative for bowel incontinence.   Genitourinary: Negative for dysuria, enuresis and frequency.   Musculoskeletal: Positive for back pain and myalgias. Negative for neck pain and neck stiffness.        Positive for right lower back pain that radiates down his right leg.   Skin: Negative for rash.   Neurological: Negative for dizziness, syncope, numbness and headaches.       Physical Exam     Initial Vitals [04/01/19 1416]   BP Pulse Resp Temp SpO2   (!) 142/69 71 20 97.5 °F (36.4 °C) 95 %      MAP       --         Physical Exam    Nursing note and vitals reviewed.  Constitutional: He appears well-developed and well-nourished. He  is cooperative.  Non-toxic appearance. He does not have a sickly appearance.   HENT:   Head: Normocephalic and atraumatic.   Right Ear: Tympanic membrane and external ear normal.   Left Ear: Tympanic membrane and external ear normal.   Nose: Nose normal.   Mouth/Throat: Oropharynx is clear and moist.   Eyes: Conjunctivae and lids are normal.   Neck: Normal range of motion and full passive range of motion without pain. Neck supple.   Cardiovascular: Normal rate, regular rhythm and normal heart sounds. Exam reveals no gallop and no friction rub.    No murmur heard.  Pulmonary/Chest: Breath sounds normal. He has no wheezes. He has no rhonchi. He has no rales.   Abdominal: Soft. Normal appearance. There is no tenderness. There is no rigidity, no rebound and no guarding.   Musculoskeletal:        Lumbar back: He exhibits tenderness. He exhibits no bony tenderness.   Right lumbar paraspinal muscle TTP. Positive straight leg raise of the right leg.   Neurological: He is alert and oriented to person, place, and time. He has normal strength. No sensory deficit.   Equal strength to bilateral lower extremities. Sensation intact.   Skin: Skin is warm, dry and intact. No rash noted.         ED Course   Procedures  Labs Reviewed - No data to display       Imaging Results    None          Medical Decision Making:   History:   I obtained history from: someone other than patient.  Old Medical Records: I decided to obtain old medical records.       APC / Resident Notes:   This is an emergent evaluation of a 68 year old with complaint of back pain. Patient reported the pain radiates down the right lower extremity. He reports it has progressively worsened over the last five days. He took motrin today with significant improvement in pain. Patient denied loss of bowel/bladder control. I considered but doubt acute fracture, cauda equina or epidural abscess. Patient is noted to have tenderness to palpation on exam. No bony tenderness or  step-off. No fever noted. Patient with normal strength bilaterally to lower extremities. I do not think radiographic imaging is warranted. I will treat their acute pain and given them a prescription for muscle relaxer for symptomatic relief at home. Return precautions given. Patient was discussed with Dr. Chiu who is in agreement with the plan of care.         Scribe Attestation:   Scribe #1: I performed the above scribed service and the documentation accurately describes the services I performed. I attest to the accuracy of the note.    I, Erin Pickard PA-C, personally performed the services described in this documentation. All medical record entries made by the scribe were at my direction and in my presence.  I have reviewed the chart and agree that the record reflects my personal performance and is accurate and complete. Erin Pickard PA-C.  5:46 PM 04/01/2019             Clinical Impression:       ICD-10-CM ICD-9-CM   1. Acute right-sided low back pain with right-sided sciatica M54.41 724.2     724.3         Disposition:   Disposition: Discharged  Condition: Stable                        Erin Pickard PA-C  04/01/19 1747

## 2019-04-01 NOTE — DISCHARGE INSTRUCTIONS
You can take the pain medication that was recently prescribed for you. If you are going to take it, you need to use a stool softener to avoid getting constipated.  Use the patch and muscle relaxer as needed.  Follow up with your primary care provider.  For worsening symptoms, chest pain, shortness of breath, increased abdominal pain, high grade fever, stroke or stroke like symptoms, immediately go to the nearest Emergency Room or call 911 as soon as possible.

## 2019-04-01 NOTE — TELEPHONE ENCOUNTER
Reason for Disposition   SEVERE back pain of sudden onset and age > 60    Protocols used: BACK PAIN-A-[place]    Pt's daughter states pt c/o pain to lower back/hip that radiates down bilateral legs, worse to LLE since this weekend. Pt rates pain 10/10. Pt advised to ED per protocol and daughter verbalizes understanding.

## 2019-04-04 ENCOUNTER — HOSPITAL ENCOUNTER (OUTPATIENT)
Dept: RADIOLOGY | Facility: HOSPITAL | Age: 69
Discharge: HOME | End: 2019-04-04
Attending: PHYSICIAN ASSISTANT
Payer: MEDICARE

## 2019-04-04 ENCOUNTER — OFFICE VISIT (OUTPATIENT)
Dept: FAMILY MEDICINE | Facility: CLINIC | Age: 69
End: 2019-04-04
Payer: MEDICARE

## 2019-04-04 ENCOUNTER — DOCUMENTATION ONLY (OUTPATIENT)
Dept: FAMILY MEDICINE | Facility: CLINIC | Age: 69
End: 2019-04-04

## 2019-04-04 VITALS
WEIGHT: 274.94 LBS | TEMPERATURE: 98 F | BODY MASS INDEX: 40.72 KG/M2 | HEART RATE: 76 BPM | SYSTOLIC BLOOD PRESSURE: 126 MMHG | DIASTOLIC BLOOD PRESSURE: 72 MMHG | HEIGHT: 69 IN

## 2019-04-04 DIAGNOSIS — I15.2 HYPERTENSION ASSOCIATED WITH DIABETES: ICD-10-CM

## 2019-04-04 DIAGNOSIS — M51.36 DEGENERATIVE DISC DISEASE, LUMBAR: ICD-10-CM

## 2019-04-04 DIAGNOSIS — M54.41 ACUTE RIGHT-SIDED LOW BACK PAIN WITH RIGHT-SIDED SCIATICA: Primary | ICD-10-CM

## 2019-04-04 DIAGNOSIS — R80.9 TYPE 2 DIABETES MELLITUS WITH MICROALBUMINURIA, WITHOUT LONG-TERM CURRENT USE OF INSULIN: ICD-10-CM

## 2019-04-04 DIAGNOSIS — M54.41 ACUTE RIGHT-SIDED LOW BACK PAIN WITH RIGHT-SIDED SCIATICA: ICD-10-CM

## 2019-04-04 DIAGNOSIS — D86.2 SARCOIDOSIS OF LUNG WITH SARCOIDOSIS OF LYMPH NODES: ICD-10-CM

## 2019-04-04 DIAGNOSIS — E66.01 OBESITY, CLASS III, BMI 40-49.9 (MORBID OBESITY): ICD-10-CM

## 2019-04-04 DIAGNOSIS — E11.29 TYPE 2 DIABETES MELLITUS WITH MICROALBUMINURIA, WITHOUT LONG-TERM CURRENT USE OF INSULIN: ICD-10-CM

## 2019-04-04 DIAGNOSIS — E11.59 HYPERTENSION ASSOCIATED WITH DIABETES: ICD-10-CM

## 2019-04-04 DIAGNOSIS — Z12.5 PROSTATE CANCER SCREENING: ICD-10-CM

## 2019-04-04 PROBLEM — N18.30 CKD (CHRONIC KIDNEY DISEASE) STAGE 3, GFR 30-59 ML/MIN: Status: RESOLVED | Noted: 2017-05-12 | Resolved: 2019-04-04

## 2019-04-04 PROCEDURE — 72100 X-RAY EXAM L-S SPINE 2/3 VWS: CPT | Mod: TC,FY

## 2019-04-04 PROCEDURE — 99214 PR OFFICE/OUTPT VISIT, EST, LEVL IV, 30-39 MIN: ICD-10-PCS | Mod: S$GLB,,, | Performed by: PHYSICIAN ASSISTANT

## 2019-04-04 PROCEDURE — 1101F PR PT FALLS ASSESS DOC 0-1 FALLS W/OUT INJ PAST YR: ICD-10-PCS | Mod: CPTII,S$GLB,, | Performed by: PHYSICIAN ASSISTANT

## 2019-04-04 PROCEDURE — 99499 UNLISTED E&M SERVICE: CPT | Mod: S$GLB,,, | Performed by: PHYSICIAN ASSISTANT

## 2019-04-04 PROCEDURE — 3044F HG A1C LEVEL LT 7.0%: CPT | Mod: CPTII,S$GLB,, | Performed by: PHYSICIAN ASSISTANT

## 2019-04-04 PROCEDURE — 3078F PR MOST RECENT DIASTOLIC BLOOD PRESSURE < 80 MM HG: ICD-10-PCS | Mod: CPTII,S$GLB,, | Performed by: PHYSICIAN ASSISTANT

## 2019-04-04 PROCEDURE — 99499 RISK ADDL DX/OHS AUDIT: ICD-10-PCS | Mod: S$GLB,,, | Performed by: PHYSICIAN ASSISTANT

## 2019-04-04 PROCEDURE — 1101F PT FALLS ASSESS-DOCD LE1/YR: CPT | Mod: CPTII,S$GLB,, | Performed by: PHYSICIAN ASSISTANT

## 2019-04-04 PROCEDURE — 99999 PR PBB SHADOW E&M-EST. PATIENT-LVL IV: CPT | Mod: PBBFAC,,, | Performed by: PHYSICIAN ASSISTANT

## 2019-04-04 PROCEDURE — 99999 PR PBB SHADOW E&M-EST. PATIENT-LVL IV: ICD-10-PCS | Mod: PBBFAC,,, | Performed by: PHYSICIAN ASSISTANT

## 2019-04-04 PROCEDURE — 72100 X-RAY EXAM L-S SPINE 2/3 VWS: CPT | Mod: 26,,, | Performed by: RADIOLOGY

## 2019-04-04 PROCEDURE — 72100 XR LUMBAR SPINE AP AND LATERAL: ICD-10-PCS | Mod: 26,,, | Performed by: RADIOLOGY

## 2019-04-04 PROCEDURE — 3074F PR MOST RECENT SYSTOLIC BLOOD PRESSURE < 130 MM HG: ICD-10-PCS | Mod: CPTII,S$GLB,, | Performed by: PHYSICIAN ASSISTANT

## 2019-04-04 PROCEDURE — 3074F SYST BP LT 130 MM HG: CPT | Mod: CPTII,S$GLB,, | Performed by: PHYSICIAN ASSISTANT

## 2019-04-04 PROCEDURE — 3078F DIAST BP <80 MM HG: CPT | Mod: CPTII,S$GLB,, | Performed by: PHYSICIAN ASSISTANT

## 2019-04-04 PROCEDURE — 99214 OFFICE O/P EST MOD 30 MIN: CPT | Mod: S$GLB,,, | Performed by: PHYSICIAN ASSISTANT

## 2019-04-04 PROCEDURE — 3044F PR MOST RECENT HEMOGLOBIN A1C LEVEL <7.0%: ICD-10-PCS | Mod: CPTII,S$GLB,, | Performed by: PHYSICIAN ASSISTANT

## 2019-04-04 RX ORDER — METHOCARBAMOL 500 MG/1
500 TABLET, FILM COATED ORAL 2 TIMES DAILY PRN
Qty: 30 TABLET | Refills: 0 | Status: SHIPPED | OUTPATIENT
Start: 2019-04-04 | End: 2019-04-14

## 2019-04-04 RX ORDER — DICLOFENAC SODIUM 10 MG/G
2 GEL TOPICAL 3 TIMES DAILY
Qty: 100 G | Refills: 2 | Status: ON HOLD | OUTPATIENT
Start: 2019-04-04 | End: 2019-05-07

## 2019-04-04 RX ORDER — GABAPENTIN 300 MG/1
300 CAPSULE ORAL NIGHTLY
Qty: 30 CAPSULE | Refills: 0 | Status: SHIPPED | OUTPATIENT
Start: 2019-04-04 | End: 2019-05-04 | Stop reason: SDUPTHER

## 2019-04-04 RX ORDER — LANCETS
EACH MISCELLANEOUS
Qty: 90 EACH | Refills: 3 | Status: SHIPPED | OUTPATIENT
Start: 2019-04-04 | End: 2019-04-15 | Stop reason: SDUPTHER

## 2019-04-04 RX ORDER — LIDOCAINE 50 MG/G
1 PATCH TOPICAL DAILY
Qty: 15 PATCH | Refills: 0 | Status: SHIPPED | OUTPATIENT
Start: 2019-04-04 | End: 2019-04-04 | Stop reason: SDUPTHER

## 2019-04-04 RX ORDER — INSULIN PUMP SYRINGE, 3 ML
EACH MISCELLANEOUS
Qty: 90 EACH | Refills: 3 | Status: SHIPPED | OUTPATIENT
Start: 2019-04-04 | End: 2020-04-03

## 2019-04-04 RX ORDER — METHOCARBAMOL 500 MG/1
500 TABLET, FILM COATED ORAL 2 TIMES DAILY PRN
Qty: 30 TABLET | Refills: 0 | Status: SHIPPED | OUTPATIENT
Start: 2019-04-04 | End: 2019-04-04 | Stop reason: SDUPTHER

## 2019-04-04 RX ORDER — LIDOCAINE 50 MG/G
1 PATCH TOPICAL DAILY
Qty: 30 PATCH | Refills: 0 | Status: SHIPPED | OUTPATIENT
Start: 2019-04-04 | End: 2019-05-24

## 2019-04-04 NOTE — PROGRESS NOTES
Pre-Visit Chart Review  For Appointment Scheduled on 04/04/19    Health Maintenance Due   Topic Date Due    Colonoscopy  11/28/2017

## 2019-04-04 NOTE — PROGRESS NOTES
Subjective:       Patient ID: Myron Noel is a 68 y.o. male.    Chief Complaint: Hospital Follow Up    HPI     Mr. Noel is a 68 year old  male with osteoarthritis and morbid obesity who presents to the clinic for a hospital follow up. He was seen in the ED 4/1 for right lower back pain that had begun 2 weeks prior. The pain was radiating down his right leg with intermittent numbness and tingling in the right leg. No bowel/bladder incontinence or fevers. He did not have imaging done at that time. He was diagnosed with sciatica and given Robaxin and discharged.    He presents today for follow up. He is still complaining of continued right lower back pain radiating down his right leg with associated right leg numbness and tingling. Pain is 9/10 with movement and weight bearing. No bowel/bladder incontinence or fevers, N/V/D. He has been taking Robaxin and using lidocaine patches with moderate relief.    1/12/17 Lumbar Xray - DDD at every level from L2-S1 with large spurs and osteophytes at every level of lower thoracic and lumbar spine with limited flexion and extension.    Review of Systems   Constitutional: Negative for activity change, appetite change, chills, fatigue and fever.   HENT: Negative for congestion, ear pain, postnasal drip, rhinorrhea and sinus pressure.    Respiratory: Negative for cough, shortness of breath and wheezing.    Cardiovascular: Negative for chest pain and palpitations.   Gastrointestinal: Negative for abdominal pain, constipation, diarrhea, nausea and vomiting.   Genitourinary: Negative for frequency, hematuria and urgency.   Musculoskeletal: Positive for back pain. Negative for gait problem.   Skin: Negative for rash.   Neurological: Negative for syncope, weakness and headaches.   Psychiatric/Behavioral: Negative for agitation and confusion.       Objective:      Physical Exam   Constitutional: He is oriented to person, place, and time. Vital signs are normal. He appears  well-developed and well-nourished. No distress.   HENT:   Head: Normocephalic and atraumatic.   Right Ear: External ear normal.   Left Ear: External ear normal.   Cardiovascular: Normal rate, regular rhythm, S1 normal, S2 normal and normal heart sounds. Exam reveals no gallop.   No murmur heard.  Pulses:       Radial pulses are 2+ on the right side, and 2+ on the left side.        Pulmonary/Chest: Effort normal and breath sounds normal. No respiratory distress. He has no wheezes. He has no rhonchi.   Musculoskeletal:   No spinal tenderness or bony deformity. Positive right leg raise. Unable to assess back range of motion secondary to pain. 5/5 strength in bilateral lower extremities.   Neurological: He is alert and oriented to person, place, and time.   Reports mildly decreased sensation to the lateral right lower leg when compared to the right.    Skin: Skin is warm and dry. He is not diaphoretic.   Appropriate skin turgor   Psychiatric: He has a normal mood and affect. His speech is normal and behavior is normal. Judgment and thought content normal. Cognition and memory are normal.       Assessment:       1. Acute right-sided low back pain with right-sided sciatica    2. Hypertension associated with diabetes    3. Obesity, Class III, BMI 40-49.9 (morbid obesity)    4. Degenerative disc disease, lumbar    5. Prostate cancer screening    6. Type 2 diabetes mellitus with microalbuminuria, without long-term current use of insulin    7. Sarcoidosis of lung with sarcoidosis of lymph nodes        Plan:       Myron was seen today for hospital follow up.    Diagnoses and all orders for this visit:    Acute right-sided low back pain with right-sided sciatica  -     X-Ray Lumbar Spine AP And Lateral; Future  -     methocarbamol (ROBAXIN) 500 MG Tab; Take 1 tablet (500 mg total) by mouth 2 (two) times daily as needed (muscle spasm/pain).  -     lidocaine (LIDODERM) 5 %; Place 1 patch onto the skin once daily. Place patch on  skin for 12 hours then remove & discard patch within 12 hours or as directed by MD  Gabapentin 300mg nightly PRN back pain.  -    Will f/u with Xray results and possibly refer to pain management. Please call the office or RTC if your symptoms change or worsen.  Patient will likely need MRI.    Hypertension associated with diabetes        -  Controlled. /72 today. Continue regimen.    Obesity, Class III, BMI 40-49.9 (morbid obesity)        -   Uncontrolled, encouraged diet and exercise.     Degenerative disc disease, lumbar        -  Uncontrolled. Reviewed Xray from 1/2017. Will f/u with xray ordered today.    Prostate cancer screening  -     PSA, Screening; Future  -     Patient requesting PSA screening today although asymptomatic. Will f/u with results.     Type 2 diabetes mellitus with microalbuminuria, without long-term current use of insulin  -     blood-glucose meter kit; To check BG 1 time daily, to use with insurance preferred meter  -     lancets Misc; To check BG 1 time daily, to use with insurance preferred meter  -     blood sugar diagnostic Strp; To check BG 1 time daily, to use with insurance preferred meter    Sarcoidosis of lung with sarcoidosis of lymph nodes        -   Stable. Followed by pulmonology.    Other orders  -     diclofenac sodium (VOLTAREN) 1 % Gel; Apply 2 g topically 3 (three) times daily.        Patient readiness: acceptance and barriers:none    During the course of the visit the patient was educated and counseled about the following:     Diabetes:  Discussed general issues about diabetes pathophysiology and management.  Hypertension:   Regular aerobic exercise.  Obesity:   General weight loss/lifestyle modification strategies discussed (elicit support from others; identify saboteurs; non-food rewards, etc).    Goals: Diabetes: Maintain Hemoglobin A1C below 7, Hypertension: Reduce Blood Pressure and Obesity: Reduce calorie intake and BMI    Did patient meet goals/outcomes:  No    The following self management tools provided: blood pressure log  blood glucose log  excercise log    Patient Instructions (the written plan) was given to the patient/family.     Time spent with patient: 45 minutes    Barriers to medications present (no )    Adverse reactions to current medications (no)    Over the counter medications reviewed (Yes)

## 2019-04-05 DIAGNOSIS — M51.36 DDD (DEGENERATIVE DISC DISEASE), LUMBAR: ICD-10-CM

## 2019-04-05 DIAGNOSIS — M54.16 LUMBAR RADICULOPATHY: Primary | ICD-10-CM

## 2019-04-08 ENCOUNTER — OFFICE VISIT (OUTPATIENT)
Dept: OPHTHALMOLOGY | Facility: CLINIC | Age: 69
End: 2019-04-08
Payer: MEDICARE

## 2019-04-08 ENCOUNTER — TELEPHONE (OUTPATIENT)
Dept: OPHTHALMOLOGY | Facility: CLINIC | Age: 69
End: 2019-04-08

## 2019-04-08 DIAGNOSIS — H33.002 RHEGMATOGENOUS RETINAL DETACHMENT OF LEFT EYE: Primary | ICD-10-CM

## 2019-04-08 DIAGNOSIS — H35.22 PROLIFERATIVE VITREORETINOPATHY, LEFT: ICD-10-CM

## 2019-04-08 PROCEDURE — 99999 PR PBB SHADOW E&M-EST. PATIENT-LVL III: ICD-10-PCS | Mod: PBBFAC,,, | Performed by: OPHTHALMOLOGY

## 2019-04-08 PROCEDURE — 99024 POSTOP FOLLOW-UP VISIT: CPT | Mod: S$GLB,,, | Performed by: OPHTHALMOLOGY

## 2019-04-08 PROCEDURE — 99999 PR PBB SHADOW E&M-EST. PATIENT-LVL III: CPT | Mod: PBBFAC,,, | Performed by: OPHTHALMOLOGY

## 2019-04-08 PROCEDURE — 99499 RISK ADDL DX/OHS AUDIT: ICD-10-PCS | Mod: S$GLB,,, | Performed by: OPHTHALMOLOGY

## 2019-04-08 PROCEDURE — 99499 UNLISTED E&M SERVICE: CPT | Mod: S$GLB,,, | Performed by: OPHTHALMOLOGY

## 2019-04-08 PROCEDURE — 99024 PR POST-OP FOLLOW-UP VISIT: ICD-10-PCS | Mod: S$GLB,,, | Performed by: OPHTHALMOLOGY

## 2019-04-09 ENCOUNTER — TELEPHONE (OUTPATIENT)
Dept: OPHTHALMOLOGY | Facility: CLINIC | Age: 69
End: 2019-04-09

## 2019-04-09 RX ORDER — ACETAMINOPHEN 500 MG
500 TABLET ORAL EVERY 6 HOURS PRN
COMMUNITY

## 2019-04-09 NOTE — PRE-PROCEDURE INSTRUCTIONS
PreOp Instructions given:    -- Medication information (what to hold and what to take)   -- NPO guidelines as follows: (or as per your Surgeon)  1. Stop ALL solid food, gum, candy (including vitamins) 8 hours before surgery/procedure time.  2. Stop all CLOUDY liquids: coffee with creamer, cloudy juices, 6 hours prior to surgery/procedure  time.  3. The patient should be ENCOURAGED to drink carbohydrate-rich clear liquids (sports drinks, clear juices) until 2 hours prior to surgery/procedure  time.  4. CLEAR liquids include only water, black coffee NO creamer, clear oral rehydration drinks, clear sports drinks or clear fruit juices (no orange juice, no pulpy juices, no apple cider).   5. IF IN DOUBT, drink water instead.   6. NOTHING TO DRINK 2 hours before to surgery/procedure  time. If you are told to take medication on the morning of surgery, it may be taken with a sip of water.   -- Arrival place and directions given; time to be given the day before procedure by the Surgeon's Office   -- Bathing with antibacterial soap   -- Don't wear any jewelry or bring any valuables AM of surgery   -- No makeup or moisturizer to face   -- No perfume/cologne, powder, lotions or aftershave     Pt verbalized understanding.

## 2019-04-10 ENCOUNTER — ANESTHESIA EVENT (OUTPATIENT)
Dept: SURGERY | Facility: HOSPITAL | Age: 69
End: 2019-04-10
Payer: MEDICARE

## 2019-04-10 ENCOUNTER — ANESTHESIA (OUTPATIENT)
Dept: SURGERY | Facility: HOSPITAL | Age: 69
End: 2019-04-10
Payer: MEDICARE

## 2019-04-10 ENCOUNTER — HOSPITAL ENCOUNTER (OUTPATIENT)
Facility: HOSPITAL | Age: 69
Discharge: HOME | End: 2019-04-10
Attending: OPHTHALMOLOGY | Admitting: OPHTHALMOLOGY
Payer: MEDICARE

## 2019-04-10 VITALS
HEIGHT: 69 IN | OXYGEN SATURATION: 100 % | SYSTOLIC BLOOD PRESSURE: 152 MMHG | TEMPERATURE: 98 F | RESPIRATION RATE: 18 BRPM | DIASTOLIC BLOOD PRESSURE: 69 MMHG | BODY MASS INDEX: 40.58 KG/M2 | WEIGHT: 274 LBS | HEART RATE: 61 BPM

## 2019-04-10 DIAGNOSIS — H35.22 PROLIFERATIVE VITREORETINOPATHY, LEFT: ICD-10-CM

## 2019-04-10 DIAGNOSIS — H33.22 RETINAL DETACHMENT, LEFT: ICD-10-CM

## 2019-04-10 DIAGNOSIS — H33.002 MACULA-OFF RHEGMATOGENOUS RETINAL DETACHMENT, LEFT: Primary | ICD-10-CM

## 2019-04-10 LAB
POCT GLUCOSE: 123 MG/DL (ref 70–110)
POCT GLUCOSE: 92 MG/DL (ref 70–110)

## 2019-04-10 PROCEDURE — 82962 GLUCOSE BLOOD TEST: CPT | Performed by: OPHTHALMOLOGY

## 2019-04-10 PROCEDURE — 99499 UNLISTED E&M SERVICE: CPT | Mod: ,,, | Performed by: OPHTHALMOLOGY

## 2019-04-10 PROCEDURE — C1814 RETINAL TAMP, SILICONE OIL: HCPCS | Performed by: OPHTHALMOLOGY

## 2019-04-10 PROCEDURE — 63600175 PHARM REV CODE 636 W HCPCS: Performed by: NURSE ANESTHETIST, CERTIFIED REGISTERED

## 2019-04-10 PROCEDURE — D9220A PRA ANESTHESIA: Mod: ANES,,, | Performed by: ANESTHESIOLOGY

## 2019-04-10 PROCEDURE — D9220A PRA ANESTHESIA: ICD-10-PCS | Mod: ANES,,, | Performed by: ANESTHESIOLOGY

## 2019-04-10 PROCEDURE — 37000008 HC ANESTHESIA 1ST 15 MINUTES: Performed by: OPHTHALMOLOGY

## 2019-04-10 PROCEDURE — S0020 INJECTION, BUPIVICAINE HYDRO: HCPCS | Performed by: OPHTHALMOLOGY

## 2019-04-10 PROCEDURE — 67113 PR REPAIR COMPLEX RETINA DETACH VITRECTOMY & MEMB PEEL: ICD-10-PCS | Mod: 79,LT,, | Performed by: OPHTHALMOLOGY

## 2019-04-10 PROCEDURE — 63600175 PHARM REV CODE 636 W HCPCS: Performed by: OPHTHALMOLOGY

## 2019-04-10 PROCEDURE — C1784 OCULAR DEV, INTRAOP, DET RET: HCPCS | Performed by: OPHTHALMOLOGY

## 2019-04-10 PROCEDURE — 25000003 PHARM REV CODE 250: Performed by: OPHTHALMOLOGY

## 2019-04-10 PROCEDURE — 27600004 OPTIME MED/SURG SUP & DEVICES INTRAOCULAR LENS: Performed by: OPHTHALMOLOGY

## 2019-04-10 PROCEDURE — D9220A PRA ANESTHESIA: ICD-10-PCS | Mod: CRNA,,, | Performed by: NURSE ANESTHETIST, CERTIFIED REGISTERED

## 2019-04-10 PROCEDURE — D9220A PRA ANESTHESIA: Mod: CRNA,,, | Performed by: NURSE ANESTHETIST, CERTIFIED REGISTERED

## 2019-04-10 PROCEDURE — 36000707: Performed by: OPHTHALMOLOGY

## 2019-04-10 PROCEDURE — 37000009 HC ANESTHESIA EA ADD 15 MINS: Performed by: OPHTHALMOLOGY

## 2019-04-10 PROCEDURE — 99499 NO LOS: ICD-10-PCS | Mod: ,,, | Performed by: OPHTHALMOLOGY

## 2019-04-10 PROCEDURE — 27201423 OPTIME MED/SURG SUP & DEVICES STERILE SUPPLY: Performed by: OPHTHALMOLOGY

## 2019-04-10 PROCEDURE — 36000706: Performed by: OPHTHALMOLOGY

## 2019-04-10 PROCEDURE — 25000003 PHARM REV CODE 250: Performed by: NURSE ANESTHETIST, CERTIFIED REGISTERED

## 2019-04-10 PROCEDURE — 67113 REPAIR RETINAL DETACH CPLX: CPT | Mod: 79,LT,, | Performed by: OPHTHALMOLOGY

## 2019-04-10 PROCEDURE — 71000015 HC POSTOP RECOV 1ST HR: Performed by: OPHTHALMOLOGY

## 2019-04-10 RX ORDER — ONDANSETRON 4 MG/1
4 TABLET, FILM COATED ORAL EVERY 8 HOURS PRN
Qty: 12 TABLET | Refills: 0 | Status: ON HOLD | OUTPATIENT
Start: 2019-04-10 | End: 2019-05-07

## 2019-04-10 RX ORDER — PHENYLEPHRINE HYDROCHLORIDE 25 MG/ML
1 SOLUTION/ DROPS OPHTHALMIC
Status: DISCONTINUED | OUTPATIENT
Start: 2019-04-10 | End: 2019-04-10 | Stop reason: HOSPADM

## 2019-04-10 RX ORDER — BUPIVACAINE HYDROCHLORIDE 7.5 MG/ML
INJECTION, SOLUTION EPIDURAL; RETROBULBAR
Status: DISCONTINUED
Start: 2019-04-10 | End: 2019-04-10 | Stop reason: HOSPADM

## 2019-04-10 RX ORDER — PROPOFOL 10 MG/ML
VIAL (ML) INTRAVENOUS
Status: DISCONTINUED | OUTPATIENT
Start: 2019-04-10 | End: 2019-04-10

## 2019-04-10 RX ORDER — BUPIVACAINE HYDROCHLORIDE 7.5 MG/ML
INJECTION, SOLUTION EPIDURAL; RETROBULBAR
Status: DISCONTINUED | OUTPATIENT
Start: 2019-04-10 | End: 2019-04-10 | Stop reason: HOSPADM

## 2019-04-10 RX ORDER — PREDNISOLONE ACETATE 10 MG/ML
1 SUSPENSION/ DROPS OPHTHALMIC
Status: DISCONTINUED | OUTPATIENT
Start: 2019-04-10 | End: 2019-04-10 | Stop reason: HOSPADM

## 2019-04-10 RX ORDER — HYDROCODONE BITARTRATE AND ACETAMINOPHEN 5; 325 MG/1; MG/1
1 TABLET ORAL EVERY 4 HOURS PRN
Status: DISCONTINUED | OUTPATIENT
Start: 2019-04-10 | End: 2019-04-10 | Stop reason: HOSPADM

## 2019-04-10 RX ORDER — HYDRALAZINE HYDROCHLORIDE 20 MG/ML
INJECTION INTRAMUSCULAR; INTRAVENOUS
Status: DISCONTINUED | OUTPATIENT
Start: 2019-04-10 | End: 2019-04-10

## 2019-04-10 RX ORDER — ACETAMINOPHEN 325 MG/1
650 TABLET ORAL EVERY 4 HOURS PRN
Status: DISCONTINUED | OUTPATIENT
Start: 2019-04-10 | End: 2019-04-10 | Stop reason: HOSPADM

## 2019-04-10 RX ORDER — VANCOMYCIN HYDROCHLORIDE 500 MG/10ML
INJECTION, POWDER, LYOPHILIZED, FOR SOLUTION INTRAVENOUS
Status: DISCONTINUED | OUTPATIENT
Start: 2019-04-10 | End: 2019-04-10 | Stop reason: HOSPADM

## 2019-04-10 RX ORDER — NEOMYCIN SULFATE, POLYMYXIN B SULFATE, AND DEXAMETHASONE 3.5; 10000; 1 MG/G; [USP'U]/G; MG/G
OINTMENT OPHTHALMIC
Status: DISCONTINUED | OUTPATIENT
Start: 2019-04-10 | End: 2019-04-10 | Stop reason: HOSPADM

## 2019-04-10 RX ORDER — DEXAMETHASONE SODIUM PHOSPHATE 4 MG/ML
INJECTION, SOLUTION INTRA-ARTICULAR; INTRALESIONAL; INTRAMUSCULAR; INTRAVENOUS; SOFT TISSUE
Status: DISCONTINUED | OUTPATIENT
Start: 2019-04-10 | End: 2019-04-10 | Stop reason: HOSPADM

## 2019-04-10 RX ORDER — TETRACAINE HYDROCHLORIDE 5 MG/ML
1 SOLUTION OPHTHALMIC
Status: DISCONTINUED | OUTPATIENT
Start: 2019-04-10 | End: 2019-04-10 | Stop reason: HOSPADM

## 2019-04-10 RX ORDER — LIDOCAINE HYDROCHLORIDE 20 MG/ML
INJECTION, SOLUTION EPIDURAL; INFILTRATION; INTRACAUDAL; PERINEURAL
Status: DISCONTINUED | OUTPATIENT
Start: 2019-04-10 | End: 2019-04-10 | Stop reason: HOSPADM

## 2019-04-10 RX ORDER — OXYCODONE AND ACETAMINOPHEN 5; 325 MG/1; MG/1
1 TABLET ORAL EVERY 6 HOURS PRN
Qty: 12 TABLET | Refills: 0 | Status: ON HOLD | OUTPATIENT
Start: 2019-04-10 | End: 2019-05-07

## 2019-04-10 RX ORDER — EPINEPHRINE 1 MG/ML
INJECTION, SOLUTION INTRACARDIAC; INTRAMUSCULAR; INTRAVENOUS; SUBCUTANEOUS
Status: DISCONTINUED | OUTPATIENT
Start: 2019-04-10 | End: 2019-04-10 | Stop reason: HOSPADM

## 2019-04-10 RX ORDER — HYDRALAZINE HYDROCHLORIDE 20 MG/ML
INJECTION INTRAMUSCULAR; INTRAVENOUS
Status: DISCONTINUED
Start: 2019-04-10 | End: 2019-04-10 | Stop reason: HOSPADM

## 2019-04-10 RX ORDER — VANCOMYCIN HYDROCHLORIDE 500 MG/10ML
INJECTION, POWDER, LYOPHILIZED, FOR SOLUTION INTRAVENOUS
Status: DISCONTINUED
Start: 2019-04-10 | End: 2019-04-10 | Stop reason: HOSPADM

## 2019-04-10 RX ORDER — TROPICAMIDE 10 MG/ML
1 SOLUTION/ DROPS OPHTHALMIC
Status: DISCONTINUED | OUTPATIENT
Start: 2019-04-10 | End: 2019-04-10 | Stop reason: HOSPADM

## 2019-04-10 RX ORDER — NEOMYCIN SULFATE, POLYMYXIN B SULFATE, AND DEXAMETHASONE 3.5; 10000; 1 MG/G; [USP'U]/G; MG/G
OINTMENT OPHTHALMIC
Status: DISCONTINUED
Start: 2019-04-10 | End: 2019-04-10 | Stop reason: HOSPADM

## 2019-04-10 RX ORDER — ONDANSETRON 8 MG/1
8 TABLET, ORALLY DISINTEGRATING ORAL EVERY 8 HOURS PRN
Status: DISCONTINUED | OUTPATIENT
Start: 2019-04-10 | End: 2019-04-10 | Stop reason: HOSPADM

## 2019-04-10 RX ORDER — CYCLOPENTOLATE HYDROCHLORIDE 10 MG/ML
1 SOLUTION/ DROPS OPHTHALMIC
Status: DISCONTINUED | OUTPATIENT
Start: 2019-04-10 | End: 2019-04-10 | Stop reason: HOSPADM

## 2019-04-10 RX ORDER — MOXIFLOXACIN 5 MG/ML
1 SOLUTION/ DROPS OPHTHALMIC
Status: DISCONTINUED | OUTPATIENT
Start: 2019-04-10 | End: 2019-04-10 | Stop reason: HOSPADM

## 2019-04-10 RX ORDER — DEXAMETHASONE SODIUM PHOSPHATE 4 MG/ML
INJECTION, SOLUTION INTRA-ARTICULAR; INTRALESIONAL; INTRAMUSCULAR; INTRAVENOUS; SOFT TISSUE
Status: DISCONTINUED
Start: 2019-04-10 | End: 2019-04-10 | Stop reason: HOSPADM

## 2019-04-10 RX ORDER — EPINEPHRINE 1 MG/ML
INJECTION, SOLUTION INTRACARDIAC; INTRAMUSCULAR; INTRAVENOUS; SUBCUTANEOUS
Status: DISCONTINUED
Start: 2019-04-10 | End: 2019-04-10 | Stop reason: HOSPADM

## 2019-04-10 RX ORDER — SODIUM CHLORIDE 9 MG/ML
INJECTION, SOLUTION INTRAVENOUS CONTINUOUS PRN
Status: DISCONTINUED | OUTPATIENT
Start: 2019-04-10 | End: 2019-04-10

## 2019-04-10 RX ORDER — LIDOCAINE HYDROCHLORIDE 20 MG/ML
INJECTION, SOLUTION EPIDURAL; INFILTRATION; INTRACAUDAL; PERINEURAL
Status: DISCONTINUED
Start: 2019-04-10 | End: 2019-04-10 | Stop reason: HOSPADM

## 2019-04-10 RX ADMIN — TROPICAMIDE 1 DROP: 10 SOLUTION/ DROPS OPHTHALMIC at 10:04

## 2019-04-10 RX ADMIN — PREDNISOLONE ACETATE 1 DROP: 10 SUSPENSION/ DROPS OPHTHALMIC at 10:04

## 2019-04-10 RX ADMIN — HOMATROPINE HYDROBROMIDE 1 DROP: 50 SOLUTION OPHTHALMIC at 10:04

## 2019-04-10 RX ADMIN — CYCLOPENTOLATE HYDROCHLORIDE 1 DROP: 10 SOLUTION/ DROPS OPHTHALMIC at 10:04

## 2019-04-10 RX ADMIN — PROPOFOL 70 MG: 10 INJECTION, EMULSION INTRAVENOUS at 10:04

## 2019-04-10 RX ADMIN — PHENYLEPHRINE HYDROCHLORIDE 1 DROP: 25 SOLUTION/ DROPS OPHTHALMIC at 10:04

## 2019-04-10 RX ADMIN — SODIUM CHLORIDE: 0.9 INJECTION, SOLUTION INTRAVENOUS at 10:04

## 2019-04-10 RX ADMIN — MOXIFLOXACIN 1 DROP: 5 SOLUTION/ DROPS OPHTHALMIC at 10:04

## 2019-04-10 RX ADMIN — PROPOFOL 20 MG: 10 INJECTION, EMULSION INTRAVENOUS at 10:04

## 2019-04-10 RX ADMIN — HYDRALAZINE HYDROCHLORIDE 10 MG: 20 INJECTION INTRAMUSCULAR; INTRAVENOUS at 11:04

## 2019-04-10 RX ADMIN — TETRACAINE HYDROCHLORIDE 1 DROP: 5 SOLUTION OPHTHALMIC at 10:04

## 2019-04-10 NOTE — BRIEF OP NOTE
Pre-Op Dx: Recurrent RRD with PVR OS    Post Op Dx: same    Procedure Performed: 23g PPV/inferior relaxing retinectomy/AFx/EL/5000cs Silicone oil (5.1cc) OS  EL #619, P 200mW, D 0.1s    Attending Surgeon: Arthur    Assistant Surgeon: Oliva    Anesthesia: Local/Mac, retrobulbar injection of 4.0cc mixture 0.75%Marcaine, 2% Xylocaine    Estimated blood loss: Minimal    Complication: None    Specimen: None    Disposition: Stable to recovery    Findings/Outcome: recurrent inferior RD.  Relaxing retinectomy made on buckle from 2:00-7:30.  Flattened nicely under air.  Good retinopexy applied.    Date of Discharge: 4/10/19    Discharge Disposition: stable to recovery then home    F/U: tomorrow

## 2019-04-10 NOTE — H&P
Pre-Operative History & Physical  Ophthalmology      SUBJECTIVE:     History of Present Illness:  Patient is a 68 y.o. 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    MEDICATIONS:   No medications prior to admission.       ALLERGIES:   Review of patient's allergies indicates:   Allergen Reactions    Metformin      GI distress    Venom-honey bee Other (See Comments)     Passed out       PAST MEDICAL HISTORY:   Past Medical History:   Diagnosis Date    Cataract     ou done//    Chronic kidney disease 2001    nephrectomy for obstructive stones    CKD (chronic kidney disease) stage 3, GFR 30-59 ml/min 5/12/2017    Dr. De Anda    Corneal abrasion, left     With leaf 40 yrs ago    Diabetes mellitus     Gout, chronic     Hypertension     Knee osteoarthritis 12/20/2013    Retinal detachment of left eye with multiple breaks 6/18/2018     PAST SURGICAL HISTORY:   Past Surgical History:   Procedure Laterality Date    APPENDECTOMY      CATARACT EXTRACTION  08/06/2014    od    CATARACT EXTRACTION W/  INTRAOCULAR LENS IMPLANT Left 11/5/14    ESOPHAGOGASTRODUODENOSCOPY (EGD) N/A 6/23/2015    [facility]    EXCISION-PTERYGIUM Left 10/15/2014    [facility]    EYE SURGERY      NEPHRECTOMY      phaco/pciol Left 11/5/2014    [facility] at [facility] OR    phaco/pciol  Right 8/6/2014    [facility] at [facility] OR    REPAIR, RETINAL DETACHMENT, WITH VITRECTOMY Left 3/20/2019    [facility] 1ST FLR    REPAIR, RETINAL DETACHMENT, WITH VITRECTOMY Left 6/20/2018    [facility] OR 1ST FLR    SCLERAL BUCKLING Left 8/15/2018    [facility] IRA Gibson [facility] at [facility] OR 1ST FLR    TONSILLECTOMY       PAST FAMILY HISTORY:   Family History   Problem Relation Age of Onset    Heart defect Father      Alzheimer's disease Maternal Aunt     Diabetes Maternal Grandmother     Melanoma Neg Hx     Psoriasis Neg Hx     Lupus Neg Hx     Eczema Neg Hx      SOCIAL HISTORY:   Social History     Tobacco Use    Smoking status: Former Smoker     Types: Pipe    Smokeless tobacco: Never Used   Substance Use Topics    Alcohol use: No     Alcohol/week: 0.0 oz    Drug use: No        MENTAL STATUS: Alert    REVIEW OF SYSTEMS: Negative    OBJECTIVE:     Vital Signs (Most Recent)       Physical Exam:  General: NAD  HEENT: Atraumatic  Lungs: Adequate respirations, LCTAB  Heart: RRR, No murmur  Abdomen: Soft NT    ASSESSMENT/PLAN:     Patient is a 68 y.o. 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     - Plan for surgical correction Plan 25g PPV/inferior retinectomy/AFx/EL/5000cs OS  For REcurrent RRD with PVR     Local MAC  LOC 45 min     - Risks/benefits/alternatives of the procedure including, but not limited to scarring, bleeding, infection, loss or decreased vision, and/or need for possible repeat surgery discussed with the patient and family.   - Informed consent obtained prior to surgery and the patient/family voiced good understanding.    Musa Baptiste  4/9/2019  9:30 PM

## 2019-04-10 NOTE — INTERVAL H&P NOTE
H&P completed on 4.9.19 has been reviewed, the patient has been examined and:  I concur with the findings and no changes have occurred since H&P was written.    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

## 2019-04-10 NOTE — OP NOTE
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    PROCEDURE PERFORMED:  A 23-gauge pars plana vitrectomy with inferior relaxing   retinectomy, air-fluid exchange, endolaser, injection of 5000 centistoke   silicone oil, 5.1 mL to the left eye.    ENDOLASER PARAMETERS:  Numbers of spots 619, power 200 milliwatts, duration 0.1   seconds.    ATTENDING SURGEON:  IRA Gibson M.D.    ASSISTANT SURGEON:  Fellow, Musa Baptiste.    ANESTHESIA:  Local monitored anesthesia care with a retrobulbar injection of 4.0   mL mixture of 0.75% Marcaine and 2% Xylocaine.    ESTIMATED BLOOD LOSS:  Minimal.    COMPLICATIONS:  None.    DISPOSITION:  Stable to recovery.    INDICATIONS FOR SURGERY:  This is a 68-year-old male who has had multiple prior   retinal detachment repairs with a prior vitrectomy and then a vitrectomy and   scleral buckle with oil placement.  He had had a shallow recurrent inferior   detachment under oil that had been grossly stable for six months.  We took the   patient back three weeks prior to surgery for a vitrectomy with oil removal and   some relaxing retinectomy and laser.  As the gas bubble went away, he started to   recur with his detachment inferiorly.  Decision was made to take the patient   back to the Operating Room to do further relaxing retinectomy and for a   long-term tamponade with silicone oil.  Risks, benefits, and alternatives of   surgery were discussed in detail with risks including loss of vision, loss of   eye, recurrent retinal detachment, infection, hemorrhage, lens dislocation,   glaucoma, hypotony, ptosis and diplopia.  The patient voiced understanding and   wished to proceed with the procedure.    DESCRIPTION OF PROCEDURE:  After proper  informed consent was obtained, the   patient was brought back to the Operating Suite at [facility] where   MAC anesthesia was induced.  Retrobulbar injection was provided as above without   complication.  The patient was prepped and draped in normal sterile fashion for   ophthalmic surgery and a lid speculum was placed in the left eye.  A standard   3-port 23-gauge pars plana vitrectomy was set up with the infusion cannula   inserted 3.5 mm posterior to the limbus.  The infusion cannula was turned on   only after observed to be free and clear of all underlying retinal tissue.    Supranasal and supratemporal trocars were also placed 3.5 mm posterior to the   limbus.  The vitrector was introduced anteriorly behind the lens and the   residual small gas bubbles were removed.  Posterior segment examination revealed   an inferior detachment extending through the macula with tractional elements   present in the posterior buckle edge.  There were still adhesions between the   prior relaxing retinectomy sites.  These were widened and extended from the 2   o'clock position clockwise to the 7:30 o'clock position and the retina did relax   following the retinectomy.  An air-fluid exchange was performed allowing the   retina to flatten.  There was some mild posterior slippage.  This was extended   and repeat air-fluid exchange was performed and then more thorough air-fluid   exchange was performed inferiorly allowing the retina to adhere with minimal   posterior slippage.  Endolaser was applied in a barrier fashion on the posterior   aspect of the retinectomy site and good takes were made all along the site.    Supranasal trocars were removed and closed with 7-0 Vicryl suture, 5000   centistoke silicone oil, approximately 5.1 mL was injected into the eye to a   normal pressure via palpation.  The supratemporal infusion trocars were removed   and closed with 7-0 Vicryl sutures.  Subconjunctival injections of  vancomycin   and Decadron were given to the patient.  The drapes were removed from the   patient.  He was washed free of Betadine prep solution.  Maxitrol ointment was   placed in the left eye.  The eye was patch shielded.  MAC anesthesia was   reversed.  He was brought to the Recovery Room in stable condition, tolerating   the procedure well.  Dr. Gibson was present for the entire case.      DAM/IN  dd: 04/10/2019 11:48:02 (CDT)  td: 04/10/2019 18:27:05 (CDT)  Doc ID   #[id number]  Job ID #[id number]    CC:

## 2019-04-10 NOTE — TRANSFER OF CARE
"Anesthesia Transfer of Care Note    Patient: Myron Noel    Procedure(s) Performed: Procedure(s) (LRB):  REPAIR, RETINAL DETACHMENT, WITH VITRECTOMY (Left)    Patient location: PACU    Anesthesia Type: MAC    Transport from OR: Transported from OR on room air with adequate spontaneous ventilation    Post pain: adequate analgesia    Post assessment: no apparent anesthetic complications    Post vital signs: stable    Level of consciousness: awake and alert    Nausea/Vomiting: no nausea/vomiting    Complications: none    Transfer of care protocol was followed      Last vitals:   Visit Vitals  BP (!) 164/78 (BP Location: Left arm, Patient Position: Lying)   Pulse (!) 54   Temp 37.2 °C (99 °F) (Skin)   Resp 18   Ht 5' 9" (1.753 m)   Wt 124.3 kg (274 lb)   SpO2 100%   BMI 40.46 kg/m²     "

## 2019-04-10 NOTE — ANESTHESIA PREPROCEDURE EVALUATION
04/10/2019  Myron Noel is a 68 y.o., male.  Pre-operative evaluation for Procedure(s) (LRB):  REPAIR, RETINAL DETACHMENT, WITH VITRECTOMY (Left)    Myron Noel is a 68 y.o. male   Past Medical History:   Diagnosis Date    Cataract     ou done//    Chronic kidney disease 2001    nephrectomy for obstructive stones    CKD (chronic kidney disease) stage 3, GFR 30-59 ml/min 5/12/2017    Dr. De Anda    Corneal abrasion, left     With leaf 40 yrs ago    Diabetes mellitus     Gout, chronic     Hypertension     Knee osteoarthritis 12/20/2013    Retinal detachment of left eye with multiple breaks 6/18/2018     Past Surgical History:   Procedure Laterality Date    APPENDECTOMY      CATARACT EXTRACTION  08/06/2014    od    CATARACT EXTRACTION W/  INTRAOCULAR LENS IMPLANT Left 11/5/14    ESOPHAGOGASTRODUODENOSCOPY (EGD) N/A 6/23/2015    [facility]    EXCISION-PTERYGIUM Left 10/15/2014    [facility]    EYE SURGERY      NEPHRECTOMY      phaco/pciol Left 11/5/2014    [facility]    phaco/pciol  Right 8/6/2014    [facility]    REPAIR, RETINAL DETACHMENT, WITH VITRECTOMY Left 3/20/2019    Performed by IRA Gibson [facility] at [facility]    REPAIR, RETINAL DETACHMENT, WITH VITRECTOMY Left 6/20/2018    [facility]    SCLERAL BUCKLING Left 8/15/2018    [facility] at [facility]    TONSILLECTOMY           LDA:     Prev airway:     Drips:     Patient Active Problem List   Diagnosis    Solitary kidney, acquired    Hypertension associated with diabetes    Chronic gout    Knee osteoarthritis    Dysphagia    Obesity, Class III, BMI 40-49.9 (morbid obesity)    Type 2  diabetes mellitus with microalbuminuria, without long-term current use of insulin    Hyperlipidemia associated with type 2 diabetes mellitus    NAFLD (nonalcoholic fatty liver disease)    Hypoxia    Sarcoidosis of lung with sarcoidosis of lymph nodes    Sleep apnea, obstructive    Chronic sinus complaints    Microalbuminuria due to type 2 diabetes mellitus    Retinal detachment of left eye with multiple breaks    Posterior vitreous detachment, bilateral    Rhegmatogenous retinal detachment of left eye    Proliferative vitreoretinopathy, left    Retinal detachment, left    Macula-off rhegmatogenous retinal detachment, left       Review of patient's allergies indicates:   Allergen Reactions    Metformin      GI distress    Venom-honey bee Other (See Comments)     Passed out        No current facility-administered medications on file prior to encounter.      Current Outpatient Medications on File Prior to Encounter   Medication Sig Dispense Refill    acetaminophen (TYLENOL) 500 MG tablet Take 500 mg by mouth every 6 (six) hours as needed for Pain.      allopurinol (ZYLOPRIM) 300 MG tablet TAKE 1 TABLET BY MOUTH TWICE A  tablet 3    atorvastatin (LIPITOR) 80 MG tablet Take 80 mg by mouth once daily.  3    fenofibrate (TRICOR) 48 MG tablet Take 1 tablet (48 mg total) by mouth once daily. 90 tablet 3    furosemide (LASIX) 20 MG tablet Take 20 mg by mouth once daily.  2    gabapentin (NEURONTIN) 300 MG capsule Take 1 capsule (300 mg total) by mouth every evening. (Patient taking differently: Take 300 mg by mouth once daily. ) 30 capsule 0    glipiZIDE (GLUCOTROL) 10 MG tablet TAKE 1 TABLET BY MOUTH TWICE A DAY BEFORE MEALS (Patient taking differently: TAKE 1 TABLET  A DAY BEFORE MEALS) 180 tablet 3    lisinopril (PRINIVIL,ZESTRIL) 20 MG tablet TAKE 1 TABLET (20 MG TOTAL) BY MOUTH ONCE DAILY. 90 tablet 3    methocarbamol (ROBAXIN) 500 MG Tab Take 1 tablet (500 mg total) by mouth 2 (two) times  daily as needed (muscle spasm/pain). 30 tablet 0    ondansetron (ZOFRAN) 4 MG tablet Take 1 tablet (4 mg total) by mouth every 8 (eight) hours as needed for Nausea. 12 tablet 0    pantoprazole (PROTONIX) 40 MG tablet Take 40 mg by mouth once daily.      blood sugar diagnostic Strp To check BG 1 time daily, to use with insurance preferred meter 90 each 3    blood-glucose meter kit To check BG 1 time daily, to use with insurance preferred meter 90 each 3    diclofenac sodium (VOLTAREN) 1 % Gel Apply 2 g topically 3 (three) times daily. 100 g 2    lancets Misc To check BG 1 time daily, to use with insurance preferred meter 90 each 3    lidocaine (LIDODERM) 5 % Place 1 patch onto the skin once daily. Place patch on skin for 12 hours then remove & discard patch within 12 hours or as directed by MD 30 patch 0       Past Surgical History:   Procedure Laterality Date    APPENDECTOMY      CATARACT EXTRACTION  08/06/2014    od    CATARACT EXTRACTION W/  INTRAOCULAR LENS IMPLANT Left 11/5/14    ESOPHAGOGASTRODUODENOSCOPY (EGD) N/A 6/23/2015    [facility]    EXCISION-PTERYGIUM Left 10/15/2014    [facility]    EYE SURGERY      NEPHRECTOMY      phaco/pciol Left 11/5/2014    Performed by [facility] at [facility]    phaco/pciol  Right 8/6/2014    Performed by [facility] at [facility]    REPAIR, RETINAL DETACHMENT, WITH VITRECTOMY Left 3/20/2019    [facility]    REPAIR, RETINAL DETACHMENT, WITH VITRECTOMY Left 6/20/2018    [facility]formed [facility] at [facility]    SCLERAL BUCKLING Left 8/15/2018    [facility]    TONSILLECTOMY                       Pre-op Assessment    I have reviewed the Patient Summary Reports.     I have reviewed the Nursing Notes.   I have reviewed the Medications.     Review of  Systems  Anesthesia Hx:  No problems with previous Anesthesia    Social:  Non-Smoker    Hematology/Oncology:  Hematology Normal   Oncology Normal     EENT/Dental:EENT/Dental Normal   Pulmonary:   Sleep Apnea, CPAP    Renal/:   nephrectomy   Hepatic/GI:   Liver Disease, Fatty liver   Musculoskeletal:   Arthritis  gout   Neurological:  Neurology Normal    Endocrine:   Diabetes, poorly controlled, type 2    Dermatological:  Skin Normal    Psych:  Psychiatric Normal           Physical Exam  General:  Obesity, Well nourished, Morbid Obesity    Airway/Jaw/Neck:  Airway Findings: Mouth Opening: Normal Tongue: Normal  General Airway Assessment: Adult  Oropharynx Findings:  Mallampati: I  TM Distance: Normal, at least 6 cm      Dental:  Dental Findings: In tact, Upper partial dentures, Periodontal disease, Severe   Chest/Lungs:  Chest/Lungs Findings: Clear to auscultation     Heart/Vascular:  Heart Findings: Rate: Normal  Rhythm: Regular Rhythm  Sounds: Normal        Mental Status:  Mental Status Findings:  Cooperative, Alert and Oriented         Anesthesia Plan  Type of Anesthesia, risks & benefits discussed:  Anesthesia Type:  MAC  Patient's Preference: MAC  Intra-op Monitoring Plan: standard ASA monitors  Intra-op Monitoring Plan Comments:   Post Op Pain Control Plan:   Post Op Pain Control Plan Comments: IV/PO opioids PRN  Induction:   IV  Beta Blocker:  Patient is not currently on a Beta-Blocker (No further documentation required).       Informed Consent: Patient understands risks and agrees with Anesthesia plan.  Questions answered. Anesthesia consent signed with patient.  ASA Score: 3     Day of Surgery Review of History & Physical:    H&P update referred to the surgeon.     Anesthesia Plan Notes: The patient's PMH was reviewed and PE was performed  Plan for MAC        Ready For Surgery From Anesthesia Perspective.

## 2019-04-10 NOTE — PLAN OF CARE
Patient awake and alert. No signs of discomfort. No complaints of pain. Eye patch dry and intact. Discharge instructions given and explained.  Patient dressed and ready for discharge

## 2019-04-10 NOTE — DISCHARGE INSTRUCTIONS
Post Op Instructions:  Patient should Maintain Eye shield & Dressing until seen tomorrow in eye clinic  Tylenol as needed for general discomfort  Use Prescription for pain medication if pain is severe  Use Prescription for Nausea (Zofran) if nausea or vomiting  No excessive exercise   No Bending, Lifting or Straining  Call MD if significant pain or nausea / vomiting uncontrolled by medications  Call MD if temperature in excess of 101' F  Maintain Head in a Face-Down Position until bed, then sleep on right side  Return to eye clinic for Post Op Examination tomorrow Morning.  Bring Medicine bag to tomorrow's appointment.

## 2019-04-11 ENCOUNTER — OFFICE VISIT (OUTPATIENT)
Dept: OPHTHALMOLOGY | Facility: CLINIC | Age: 69
End: 2019-04-11
Payer: MEDICARE

## 2019-04-11 DIAGNOSIS — H33.002 MACULA-OFF RHEGMATOGENOUS RETINAL DETACHMENT, LEFT: Primary | ICD-10-CM

## 2019-04-11 DIAGNOSIS — H35.22 PROLIFERATIVE VITREORETINOPATHY, LEFT: ICD-10-CM

## 2019-04-11 PROCEDURE — 99999 PR PBB SHADOW E&M-EST. PATIENT-LVL II: ICD-10-PCS | Mod: PBBFAC,,, | Performed by: OPHTHALMOLOGY

## 2019-04-11 PROCEDURE — 99999 PR PBB SHADOW E&M-EST. PATIENT-LVL II: CPT | Mod: PBBFAC,,, | Performed by: OPHTHALMOLOGY

## 2019-04-11 PROCEDURE — 99024 POSTOP FOLLOW-UP VISIT: CPT | Mod: S$GLB,,, | Performed by: OPHTHALMOLOGY

## 2019-04-11 PROCEDURE — 99024 PR POST-OP FOLLOW-UP VISIT: ICD-10-PCS | Mod: S$GLB,,, | Performed by: OPHTHALMOLOGY

## 2019-04-11 NOTE — ANESTHESIA RELEASE NOTE
"Anesthesia Release from PACU Note    Patient: Myron Noel    Procedure(s) Performed: Procedure(s) (LRB):  REPAIR, RETINAL DETACHMENT, WITH VITRECTOMY (Left)    Anesthesia type: MAC    Post pain: Adequate analgesia    Post assessment: no apparent anesthetic complications and tolerated procedure well    Last Vitals:   Visit Vitals  BP (!) 152/69 (BP Location: Left arm, Patient Position: Lying)   Pulse 61   Temp 36.8 °C (98.2 °F) (Skin)   Resp 18   Ht 5' 9" (1.753 m)   Wt 124.3 kg (274 lb)   SpO2 100%   BMI 40.46 kg/m²       Post vital signs: stable    Level of consciousness: awake and alert     Nausea/Vomiting: no nausea/no vomiting    Complications: none    Airway Patency: patent    Respiratory: unassisted, spontaneous ventilation, room air    Cardiovascular: stable and blood pressure at baseline    Hydration: euvolemic  "

## 2019-04-11 NOTE — PROGRESS NOTES
HPI     Post-op Evaluation      Additional comments: 1 day po              Comments       Prior OCT - OD ME  OS  Fovea flat  Inferior macula fluid - some improvement        A/P    1. RRD OS with multiple breaks    s/p 25g PPV/AFx/EL/C3F8 OS for RRD 6/20/28 8/13/18 - Recurrent inferior RRD with PVR OS    s/p /270 25g PPV/membrane stripping/EL/AFx/1000cs Katt oil OS for RRD with PVR 8/15/18    10/18 - inferior RD beneath oil   12/18 -stable    s/p 25g PPV/SO removal/inferior laser retinopexy (from midphery to ora)/AFx/C3F8 OS for RRD 3/20/19    Recurrent RD beneath Gas      S/p  25g PPV/inferior retinectomy/AFx/EL/5000cs OS  For REcurrent RRD with PVR 4/10/19    Doing well, iop ok    Start gtts QID  Ointment/shield QHS    Side sleep    F/U 1 week    2. PCIOL OU    3. PVD OD    4. DM  No DR  BS/BP/chol control

## 2019-04-11 NOTE — ANESTHESIA POSTPROCEDURE EVALUATION
Anesthesia Post Evaluation    Patient: Myron Noel    Procedure(s) Performed: Procedure(s) (LRB):  REPAIR, RETINAL DETACHMENT, WITH VITRECTOMY (Left)    Final Anesthesia Type: MAC  Patient location during evaluation: PACU  Patient participation: Yes- Able to Participate  Level of consciousness: awake and alert  Post-procedure vital signs: reviewed and stable  Pain management: adequate  Airway patency: patent  PONV status at discharge: No PONV  Anesthetic complications: no      Cardiovascular status: hemodynamically stable  Respiratory status: unassisted, spontaneous ventilation and room air  Hydration status: euvolemic  Follow-up not needed.          Vitals Value Taken Time   /69 4/10/2019 12:15 PM   Temp 36.8 °C (98.2 °F) 4/10/2019 12:15 PM   Pulse 61 4/10/2019 12:15 PM   Resp 18 4/10/2019 12:15 PM   SpO2 100 % 4/10/2019 12:15 PM         No case tracking events are documented in the log.      Pain/Yousif Score: Pain Rating Prior to Med Admin: 0 (4/10/2019 12:15 PM)  Pain Rating Post Med Admin: 0 (4/10/2019 12:15 PM)  Yousif Score: 10 (4/10/2019 11:40 AM)

## 2019-04-15 ENCOUNTER — OFFICE VISIT (OUTPATIENT)
Dept: OPHTHALMOLOGY | Facility: CLINIC | Age: 69
End: 2019-04-15
Payer: MEDICARE

## 2019-04-15 DIAGNOSIS — H43.813 POSTERIOR VITREOUS DETACHMENT, BILATERAL: ICD-10-CM

## 2019-04-15 DIAGNOSIS — E11.29 TYPE 2 DIABETES MELLITUS WITH MICROALBUMINURIA, WITHOUT LONG-TERM CURRENT USE OF INSULIN: ICD-10-CM

## 2019-04-15 DIAGNOSIS — H33.022 RETINAL DETACHMENT OF LEFT EYE WITH MULTIPLE BREAKS: Primary | ICD-10-CM

## 2019-04-15 DIAGNOSIS — R80.9 TYPE 2 DIABETES MELLITUS WITH MICROALBUMINURIA, WITHOUT LONG-TERM CURRENT USE OF INSULIN: ICD-10-CM

## 2019-04-15 PROCEDURE — 99999 PR PBB SHADOW E&M-EST. PATIENT-LVL II: ICD-10-PCS | Mod: PBBFAC,,, | Performed by: OPHTHALMOLOGY

## 2019-04-15 PROCEDURE — 99024 PR POST-OP FOLLOW-UP VISIT: ICD-10-PCS | Mod: S$GLB,,, | Performed by: OPHTHALMOLOGY

## 2019-04-15 PROCEDURE — 99024 POSTOP FOLLOW-UP VISIT: CPT | Mod: S$GLB,,, | Performed by: OPHTHALMOLOGY

## 2019-04-15 PROCEDURE — 99999 PR PBB SHADOW E&M-EST. PATIENT-LVL II: CPT | Mod: PBBFAC,,, | Performed by: OPHTHALMOLOGY

## 2019-04-15 RX ORDER — LANCETS
EACH MISCELLANEOUS
Qty: 90 EACH | Refills: 3 | Status: SHIPPED | OUTPATIENT
Start: 2019-04-15

## 2019-04-15 NOTE — PROGRESS NOTES
HPI     Post-op Evaluation      Additional comments: 4 day check              Comments     DLS 4/11/19  He fell about 3:00 this morning. He didn't hit his head but is still concerned about eye.    PF x 4  HA x 4  vigamox x 4  Bill qhs      HPI     Post-op Evaluation      Additional comments: 1 day po              Comments       Prior OCT - OD ME  OS  Fovea flat  Inferior macula fluid - some improvement        A/P    1. RRD OS with multiple breaks    s/p 25g PPV/AFx/EL/C3F8 OS for RRD 6/20/28 8/13/18 - Recurrent inferior RRD with PVR OS    s/p /270 25g PPV/membrane stripping/EL/AFx/1000cs Katt oil OS for RRD with PVR 8/15/18    10/18 - inferior RD beneath oil   12/18 -stable    s/p 25g PPV/SO removal/inferior laser retinopexy (from midphery to ora)/AFx/C3F8 OS for RRD 3/20/19    Recurrent RD beneath Gas      S/p  25g PPV/inferior retinectomy/AFx/EL/5000cs OS  For REcurrent RRD with PVR 4/10/19    Doing well, iop ok    Keep gtts QID Ointment/shield QHS until Thursday    Then PF 3/2/1/0    Side sleep    F/U 1 month OCT    2. PCIOL OU    3. PVD OD    4. DM  No   BS/BP/chol control

## 2019-04-15 NOTE — TELEPHONE ENCOUNTER
Will fax to [facility] after printed and signed.              ----- Message from Parminder Evangelista sent at 4/15/2019  1:26 PM CDT -----  Type:  RX Refill Request    Who Called:  [facility] representative- [place] Refill or New Rx:  Refill   RX Name and Strength:  Testing strips, lancets   How is the patient currently taking it? (ex. 1XDay):    Is this a 30 day or 90 day RX:  90  Day supply Preferred Pharmacy with phone number: [facility] mail order Local or Mail Order:  Mail order  Ordering Provider:  Dr Mckoy   Best Call Back Number:  [phone] / fax [phone]  Additional Information:

## 2019-04-16 ENCOUNTER — HOSPITAL ENCOUNTER (OUTPATIENT)
Dept: RADIOLOGY | Facility: HOSPITAL | Age: 69
Discharge: HOME | End: 2019-04-16
Attending: PHYSICIAN ASSISTANT
Payer: MEDICARE

## 2019-04-16 DIAGNOSIS — M54.16 LUMBAR RADICULOPATHY: ICD-10-CM

## 2019-04-16 DIAGNOSIS — M51.36 DDD (DEGENERATIVE DISC DISEASE), LUMBAR: ICD-10-CM

## 2019-04-16 PROCEDURE — 72148 MRI LUMBAR SPINE W/O DYE: CPT | Mod: TC

## 2019-04-16 PROCEDURE — 72148 MRI LUMBAR SPINE WITHOUT CONTRAST: ICD-10-PCS | Mod: 26,,, | Performed by: RADIOLOGY

## 2019-04-16 PROCEDURE — 72148 MRI LUMBAR SPINE W/O DYE: CPT | Mod: 26,,, | Performed by: RADIOLOGY

## 2019-04-17 ENCOUNTER — TELEPHONE (OUTPATIENT)
Dept: FAMILY MEDICINE | Facility: CLINIC | Age: 69
End: 2019-04-17

## 2019-04-17 DIAGNOSIS — M51.36 DDD (DEGENERATIVE DISC DISEASE), LUMBAR: Primary | ICD-10-CM

## 2019-04-17 NOTE — TELEPHONE ENCOUNTER
----- Message from Trinh Garcia sent at 4/17/2019  2:15 PM CDT -----  Contact: daughter  Daughter - Britta Noel [phone] is returning call concerning MRI results but she does not know who called

## 2019-04-17 NOTE — TELEPHONE ENCOUNTER
----- Message from Seema Gastelum PA-C sent at 4/16/2019  3:25 PM CDT -----  MRI of the low back shows that there is significant degenerative disc disease on multiple levels. There is no acute fracture. There is some narrowing present also. I recommend that patient be referred to back and spine clinic for additional evaluation. Does he agree to this?

## 2019-04-17 NOTE — TELEPHONE ENCOUNTER
Patient daughter notified of results and states understanding.  Provided patient with phone number to dr Leggett.  Please sign referral

## 2019-04-22 ENCOUNTER — OFFICE VISIT (OUTPATIENT)
Dept: PAIN MEDICINE | Facility: CLINIC | Age: 69
End: 2019-04-22
Payer: MEDICARE

## 2019-04-22 VITALS
SYSTOLIC BLOOD PRESSURE: 122 MMHG | WEIGHT: 274 LBS | BODY MASS INDEX: 40.58 KG/M2 | HEART RATE: 62 BPM | HEIGHT: 69 IN | DIASTOLIC BLOOD PRESSURE: 71 MMHG

## 2019-04-22 DIAGNOSIS — M47.896 OTHER SPONDYLOSIS, LUMBAR REGION: ICD-10-CM

## 2019-04-22 DIAGNOSIS — M48.062 SPINAL STENOSIS OF LUMBAR REGION WITH NEUROGENIC CLAUDICATION: ICD-10-CM

## 2019-04-22 DIAGNOSIS — M51.36 DDD (DEGENERATIVE DISC DISEASE), LUMBAR: Primary | ICD-10-CM

## 2019-04-22 PROCEDURE — 3074F PR MOST RECENT SYSTOLIC BLOOD PRESSURE < 130 MM HG: ICD-10-PCS | Mod: CPTII,S$GLB,, | Performed by: ANESTHESIOLOGY

## 2019-04-22 PROCEDURE — 3078F DIAST BP <80 MM HG: CPT | Mod: CPTII,S$GLB,, | Performed by: ANESTHESIOLOGY

## 2019-04-22 PROCEDURE — 1100F PTFALLS ASSESS-DOCD GE2>/YR: CPT | Mod: CPTII,S$GLB,, | Performed by: ANESTHESIOLOGY

## 2019-04-22 PROCEDURE — 3288F PR FALLS RISK ASSESSMENT DOCUMENTED: ICD-10-PCS | Mod: CPTII,S$GLB,, | Performed by: ANESTHESIOLOGY

## 2019-04-22 PROCEDURE — 3078F PR MOST RECENT DIASTOLIC BLOOD PRESSURE < 80 MM HG: ICD-10-PCS | Mod: CPTII,S$GLB,, | Performed by: ANESTHESIOLOGY

## 2019-04-22 PROCEDURE — 3288F FALL RISK ASSESSMENT DOCD: CPT | Mod: CPTII,S$GLB,, | Performed by: ANESTHESIOLOGY

## 2019-04-22 PROCEDURE — 99999 PR PBB SHADOW E&M-EST. PATIENT-LVL IV: ICD-10-PCS | Mod: PBBFAC,,, | Performed by: ANESTHESIOLOGY

## 2019-04-22 PROCEDURE — 3074F SYST BP LT 130 MM HG: CPT | Mod: CPTII,S$GLB,, | Performed by: ANESTHESIOLOGY

## 2019-04-22 PROCEDURE — 1100F PR PT FALLS ASSESS DOC 2+ FALLS/FALL W/INJURY/YR: ICD-10-PCS | Mod: CPTII,S$GLB,, | Performed by: ANESTHESIOLOGY

## 2019-04-22 PROCEDURE — 99204 PR OFFICE/OUTPT VISIT, NEW, LEVL IV, 45-59 MIN: ICD-10-PCS | Mod: S$GLB,,, | Performed by: ANESTHESIOLOGY

## 2019-04-22 PROCEDURE — 99999 PR PBB SHADOW E&M-EST. PATIENT-LVL IV: CPT | Mod: PBBFAC,,, | Performed by: ANESTHESIOLOGY

## 2019-04-22 PROCEDURE — 99204 OFFICE O/P NEW MOD 45 MIN: CPT | Mod: S$GLB,,, | Performed by: ANESTHESIOLOGY

## 2019-04-22 NOTE — PROGRESS NOTES
This note was completed with dictation software and grammatical errors may exist.    Referring Physician: Jennifer Black PA    PCP: Adelaida Mckoy MD      CC: low back pain    HPI:   Myron Noel is a 68 y.o. male with PMHx of HTN, HLD, DM II, GERD, and chronic pain who presents with complaint of low back pain.  He states his pain began insidiously approximately 1 year that has become progressively worse, particularly over the past 2 months. He describes his pain as an intermittent lower lumbar pain with occasional radiation down the side of his leg to his ankle (R>L). He states his pain is exacerbated by standing from a seated position as well as sitting from a standing position, and spine extension. He says his pain is alleviated by spine flexion, rest, and lying on his side. He says he takes gabapentin, Robaxin, and Percocet 5-325 mg which he states does provide relief. He has also been prescribed Voltaren gel and Lidocaine patches, however, he has been unable to fill his prescription 2/2 insurance issues. He states he has never had spine surgery nor Pain Management interventions in the past. He also denies having seen Physical Therapy for his back pain in the past.     ROS:  CONSTITUTIONAL: No fevers, chills, night sweats, wt. loss, appetite changes  SKIN: no rashes or itching  ENT: No headaches, head trauma, vision changes, or eye pain  LYMPH NODES: None noticed   CV: No chest pain, palpitations.   RESP: No shortness of breath, dyspnea on exertion, cough, wheezing, or hemoptysis  GI: No nausea, emesis, diarrhea, constipation, melena, hematochezia, pain.    : No dysuria, hematuria, urgency, or frequency   HEME: No easy bruising, bleeding problems  PSYCHIATRIC: No depression, anxiety, psychosis, hallucinations.  NEURO: No seizures, memory loss, dizziness or difficulty sleeping  MSK: lumbar joint pain      Past Medical History:   Diagnosis Date    Cataract     ou done//    Chronic kidney disease 2001     nephrectomy for obstructive stones    CKD (chronic kidney disease) stage 3, GFR 30-59 ml/min 5/12/2017    Dr. De Anda    Corneal abrasion, left     With leaf 40 yrs ago    Diabetes mellitus     Gout, chronic     Hypertension     Knee osteoarthritis 12/20/2013    Retinal detachment of left eye with multiple breaks 6/18/2018     Past Surgical History:   Procedure Laterality Date    APPENDECTOMY      CATARACT EXTRACTION  08/06/2014    od    CATARACT EXTRACTION W/  INTRAOCULAR LENS IMPLANT Left 11/5/14    ESOPHAGOGASTRODUODENOSCOPY (EGD) N/A 6/23/2015    [facility]O    EXCISION-PTERYGIUM Left 10/15/2014    [facility]    EYE SURGERY      NEPHRECTOMY      phaco/pciol Left 11/5/2014    [facility] at [facility] OR    phaco/pciol  Right 8/6/2014    [facility] at [facility]    REPAIR, RETINAL DETACHMENT, WITH VITRECTOMY Left 4/10/2019    [facility] 1ST FLR    REPAIR, RETINAL DETACHMENT, WITH VITRECTOMY Left 3/20/2019    [facility] at [facility] 1ST FLR    REPAIR, RETINAL DETACHMENT, WITH VITRECTOMY Left 6/20/2018    [facility] at [facility] 1ST FLR    SCLERAL BUCKLING Left 8/15/2018    Performed by RIA Gibson [facility] at [facility] OR 1ST FLR    TONSILLECTOMY       Family History   Problem Relation Age of Onset    Heart defect Father     Alzheimer's disease Maternal Aunt     Diabetes Maternal Grandmother     Melanoma Neg Hx     Psoriasis Neg Hx     Lupus Neg Hx     Eczema Neg Hx      Social History     Socioeconomic History    Marital status: Single     Spouse name: Not on file    Number of children: Not on file    Years of education: Not on file    Highest education level: Not on file   Occupational History    Not on file   Social Needs    Financial resource strain: Not on file    Food  "insecurity:     Worry: Not on file     Inability: Not on file    Transportation needs:     Medical: Not on file     Non-medical: Not on file   Tobacco Use    Smoking status: Former Smoker     Types: Pipe    Smokeless tobacco: Never Used   Substance and Sexual Activity    Alcohol use: No     Alcohol/week: 0.0 oz    Drug use: No    Sexual activity: Yes     Partners: Female   Lifestyle    Physical activity:     Days per week: Not on file     Minutes per session: Not on file    Stress: Not on file   Relationships    Social connections:     Talks on phone: Not on file     Gets together: Not on file     Attends Jewish service: Not on file     Active member of club or organization: Not on file     Attends meetings of clubs or organizations: Not on file     Relationship status: Not on file   Other Topics Concern    Not on file   Social History Narrative    Not on file         Medications/Allergies: See med card    Vitals:    04/22/19 0943   BP: 122/71   Pulse: 62   Weight: 124.3 kg (274 lb)   Height: 5' 9" (1.753 m)   PainSc:   2   PainLoc: Back         Physical exam:    GENERAL: A and O x3, the patient appears well groomed and is in no acute distress.  Skin: No rashes or obvious lesions  HEENT: normocephalic, atraumatic  CARDIOVASCULAR:  Palpable peripheral pulses  LUNGS: easy work of breathing  ABDOMEN: soft, nontender   UPPER EXTREMITIES: Normal alignment, normal range of motion, no atrophy, no skin changes,  hair growth and nail growth normal and equal bilaterally. No swelling, no tenderness.    LOWER EXTREMITIES:  Normal alignment, normal range of motion, no atrophy, no skin changes,  hair growth and nail growth normal and equal bilaterally. No swelling, no tenderness.    LUMBAR SPINE  Lumbar spine: ROM is full with flexion with no increased pain. ROM full with extension and oblique extension, however with moderate increased pain.    KEV and FADER negative bilaterally.   Supine straight leg raise is " negative bilaterally.    Positive axial loading.   Internal and external rotation of the hip causes no increased pain on either side.  Pain to palpation over GT bursa on right.   Myofascial exam: No tenderness to palpation across lumbar paraspinous muscles.      MENTAL STATUS: normal orientation, speech, language, and fund of knowledge for social situation.  Emotional state appropriate.    CRANIAL NERVES:  II:  PERRL bilaterally,   III,IV,VI: EOMI.    V:  Facial sensation equal bilaterally  VII:  Facial motor function normal.  VIII:  Hearing equal to finger rub bilaterally  IX/X: Gag normal, palate symmetric  XI:  Shoulder shrug equal, head turn equal  XII:  Tongue midline without fasciculations      MOTOR: Tone and bulk: normal bilateral upper and lower Strength: normal   Delt Bi Tri WE WF     R 5 5 5 5 5 5   L 5 5 5 5 5 5     IP ADD ABD Quad TA Gas HAM  R 5 5 5 5 5 5 5  L 5 5 5 5 5 5 5    SENSATION: Light touch and pinprick intact bilaterally  REFLEXES: normal, symmetric, nonbrisk.  Toes down, no clonus. No hoffmans.  GAIT: Antalgic       Imaging:  MRI Lumbar Spine  FINDINGS:  Vertebral column: As seen on comparison plain films, there is extensive multilevel degenerative change including degenerative disc and facet arthropathy.  There is no fracture.  There is large anterior osteophyte formation throughout the lumbar spine.  There is marked disc space narrowing from L2-3 through the L5-S1 levels with mild disc space narrowing at the L1-2 level.  Extensive degenerative endplate signal changes most apparent at the L3-4 level where there was sclerosis on plain films.  There is 3 mm retrolisthesis of L5 on S1 with 3 mm anterolisthesis of L4 on L5.  Baseline marrow signal is normal.    Spinal canal, conus, epidural space: The spinal canal may be borderline small on a developmental basis.  The conus terminates at the level of L1 and is normal in contour and signal intensity.    Findings by level:    On the sagittal  images, there is no spinal stenosis or cord compression at the T10-11 or T11-12 levels.    T12-L1: There is facet joint arthropathy.  There is no spinal canal or significant foraminal stenosis.    L1-2: There is mild disc space narrowing.  There is a diffuse disc bulge with osteophytic ridging and there is mild-to-moderate facet joint arthropathy.  There is no significant spinal stenosis.  There is moderate right and mild left foraminal stenosis.    L2-3: There is marked disc space narrowing.  There is a diffuse disc bulge with osteophytic ridging and there is mild-to-moderate facet joint arthropathy.  There is flattening of the ventral dural sac.  There is borderline spinal stenosis with moderate to marked right and moderate left foraminal stenosis.    L3-4: There is marked disc space narrowing.  There is a moderate to marked diffuse disc bulge with osteophytic ridging and superimposed broad central disc protrusion with annular fissure.  There is marked facet joint arthropathy with ligamentum flavum thickening.  The result is severe spinal canal, left greater than right lateral recess stenosis as well as severe right and moderate to severe left foraminal stenosis.    L4-5: There is mild anterolisthesis of L4 on L5.  There is disc space narrowing, diffuse disc bulge with osteophytic ridging and superimposed broad left paracentral disc protrusion.  There is marked facet joint arthropathy with ligamentum flavum thickening.  There are facet joint effusions.  The result is severe spinal canal, left greater than right lateral recess stenosis with severe left and moderate right foraminal stenosis.    L5-S1: There is marked disc space narrowing, mild retrolisthesis of L5 on S1, moderate to marked bilateral facet joint arthropathy, diffuse disc bulge with osteophytic ridging.  There is no significant spinal stenosis but there is severe right and moderate to severe left foraminal stenosis.    Soft tissues, other: The  prevertebral soft tissues are grossly normal.  The posterior paraspinous muscles are atrophic.    Assessment:  Myron Noel is a 68 y.o. male with PMHx of HTN, HLD, DM II, GERD, and chronic pain who presents with complaint of low back pain consistent with DDD and lumbar radiculopathy.  1. DDD (degenerative disc disease), lumbar    2. Spinal stenosis of lumbar region with neurogenic claudication    3. Other spondylosis, lumbar region        Plan:  1. I have stressed the importance of physical activity and exercise to improve overall health  2. Reviewed pertinent imaging and records with patient  3. I think that the patient's back pain and radicular leg symptoms are due to degenerative disc disease and have recommended a lumbar epidural steroid injection to the Right L3-4 and L4-5 level(s).  4. Follow up after procedure    Thank you for referring this interesting patient, and I look forward to continuing to collaborate in his care.

## 2019-04-24 ENCOUNTER — PATIENT OUTREACH (OUTPATIENT)
Dept: ADMINISTRATIVE | Facility: HOSPITAL | Age: 69
End: 2019-04-24

## 2019-04-24 DIAGNOSIS — M54.16 LUMBAR RADICULOPATHY: Primary | ICD-10-CM

## 2019-04-24 NOTE — LETTER
May 2, 2019    Myron Noel  [address] [place] [address]  [place] [address]             [facility]  [address]  [facility] [address]  Phone: [phone] Dear [facility] is committed to your overall health and would like to ensure that you are up to date on your recommended test and/or procedures.   Adelaida Mckoy MD  has found that your chart shows you may be due for the following:     COLORECTAL CANCER SCREENING       If you have had any of the above done at another facility, please let us know so that we may obtain copies from that facility.  If you have a copy of these records, please provide a copy for us to scan into your chart.  You are welcome to request that the report be faxed to us at  ([phone]).     Otherwise, please schedule these appointments at your earliest convenience by calling [phone] or going to [email or web address].     If you have an upcoming scheduled appointment for the item above, please disregard this letter.     Sincerely,   Your [facility]   MD Jud Pace LPN Clinical Care Coordinator   [facility]   [address] [facility] [address]   Phone [phone]   Fax [phone]

## 2019-05-04 DIAGNOSIS — M54.41 ACUTE RIGHT-SIDED LOW BACK PAIN WITH RIGHT-SIDED SCIATICA: ICD-10-CM

## 2019-05-06 RX ORDER — NAPROXEN SODIUM 220 MG/1
81 TABLET, FILM COATED ORAL DAILY
COMMUNITY

## 2019-05-06 RX ORDER — GABAPENTIN 300 MG/1
300 CAPSULE ORAL DAILY
Qty: 30 CAPSULE | Refills: 3 | Status: SHIPPED | OUTPATIENT
Start: 2019-05-06 | End: 2019-09-10 | Stop reason: SDUPTHER

## 2019-05-07 ENCOUNTER — HOSPITAL ENCOUNTER (OUTPATIENT)
Facility: AMBULARY SURGERY CENTER | Age: 69
Discharge: HOME | End: 2019-05-07
Attending: ANESTHESIOLOGY | Admitting: ANESTHESIOLOGY
Payer: MEDICARE

## 2019-05-07 DIAGNOSIS — M54.16 LUMBAR RADICULITIS: Primary | ICD-10-CM

## 2019-05-07 LAB — POCT GLUCOSE: 114 MG/DL (ref 70–110)

## 2019-05-07 PROCEDURE — 64484 PRA INJECT ANES/STEROID FORAMEN LUMBAR/SACRAL W IMG GUIDE ,EA ADD LEVEL: ICD-10-PCS | Mod: RT,,, | Performed by: ANESTHESIOLOGY

## 2019-05-07 PROCEDURE — 64483 NJX AA&/STRD TFRM EPI L/S 1: CPT | Mod: RT,,, | Performed by: ANESTHESIOLOGY

## 2019-05-07 PROCEDURE — 64484 NJX AA&/STRD TFRM EPI L/S EA: CPT | Performed by: ANESTHESIOLOGY

## 2019-05-07 PROCEDURE — 99152 PR MOD CONSCIOUS SEDATION, SAME PHYS, 5+ YRS, FIRST 15 MIN: ICD-10-PCS | Mod: ,,, | Performed by: ANESTHESIOLOGY

## 2019-05-07 PROCEDURE — 64484 NJX AA&/STRD TFRM EPI L/S EA: CPT | Mod: RT,,, | Performed by: ANESTHESIOLOGY

## 2019-05-07 PROCEDURE — 64483 PR EPIDURAL INJ, ANES/STEROID, TRANSFORAMINAL, LUMB/SACR, SNGL LEVL: ICD-10-PCS | Mod: RT,,, | Performed by: ANESTHESIOLOGY

## 2019-05-07 PROCEDURE — 99152 MOD SED SAME PHYS/QHP 5/>YRS: CPT | Mod: ,,, | Performed by: ANESTHESIOLOGY

## 2019-05-07 PROCEDURE — 64483 NJX AA&/STRD TFRM EPI L/S 1: CPT | Performed by: ANESTHESIOLOGY

## 2019-05-07 RX ORDER — DEXAMETHASONE SODIUM PHOSPHATE 10 MG/ML
INJECTION INTRAMUSCULAR; INTRAVENOUS
Status: DISCONTINUED | OUTPATIENT
Start: 2019-05-07 | End: 2019-05-07 | Stop reason: HOSPADM

## 2019-05-07 RX ORDER — SODIUM CHLORIDE, SODIUM LACTATE, POTASSIUM CHLORIDE, CALCIUM CHLORIDE 600; 310; 30; 20 MG/100ML; MG/100ML; MG/100ML; MG/100ML
INJECTION, SOLUTION INTRAVENOUS ONCE AS NEEDED
Status: COMPLETED | OUTPATIENT
Start: 2019-05-07 | End: 2019-05-07

## 2019-05-07 RX ORDER — LIDOCAINE HYDROCHLORIDE 10 MG/ML
INJECTION, SOLUTION EPIDURAL; INFILTRATION; INTRACAUDAL; PERINEURAL
Status: DISCONTINUED | OUTPATIENT
Start: 2019-05-07 | End: 2019-05-07 | Stop reason: HOSPADM

## 2019-05-07 RX ORDER — DEXAMETHASONE SODIUM PHOSPHATE 10 MG/ML
INJECTION INTRAMUSCULAR; INTRAVENOUS
Status: DISCONTINUED
Start: 2019-05-07 | End: 2019-05-07 | Stop reason: HOSPADM

## 2019-05-07 RX ORDER — MIDAZOLAM HYDROCHLORIDE 1 MG/ML
INJECTION INTRAMUSCULAR; INTRAVENOUS
Status: DISCONTINUED | OUTPATIENT
Start: 2019-05-07 | End: 2019-05-07 | Stop reason: HOSPADM

## 2019-05-07 RX ORDER — FENTANYL CITRATE 50 UG/ML
INJECTION, SOLUTION INTRAMUSCULAR; INTRAVENOUS
Status: DISCONTINUED | OUTPATIENT
Start: 2019-05-07 | End: 2019-05-07 | Stop reason: HOSPADM

## 2019-05-07 RX ORDER — BUPIVACAINE HYDROCHLORIDE 2.5 MG/ML
INJECTION, SOLUTION EPIDURAL; INFILTRATION; INTRACAUDAL
Status: DISCONTINUED | OUTPATIENT
Start: 2019-05-07 | End: 2019-05-07 | Stop reason: HOSPADM

## 2019-05-07 RX ADMIN — SODIUM CHLORIDE, SODIUM LACTATE, POTASSIUM CHLORIDE, CALCIUM CHLORIDE: 600; 310; 30; 20 INJECTION, SOLUTION INTRAVENOUS at 02:05

## 2019-05-07 NOTE — DISCHARGE SUMMARY
[facility]  Discharge Note  Short Stay    Admit Date: 5/7/2019    Discharge Date and Time: 5/7/2019    Attending Physician: Prince Clemente MD     Discharge Provider: Prince Clemente    Diagnoses:  Active Hospital Problems    Diagnosis  POA    *Lumbar radiculitis [M54.16]  Yes      Resolved Hospital Problems   No resolved problems to display.       Hospital Course: Lumbar JOANN  Discharged Condition: Good    Final Diagnoses:   Active Hospital Problems    Diagnosis  POA    *Lumbar radiculitis [M54.16]  Yes      Resolved Hospital Problems   No resolved problems to display.       Disposition: Home or Self Care    Follow up/Patient Instructions:    Medications:  Reconciled Home Medications:      Medication List      CHANGE how you take these medications    glipiZIDE 10 MG tablet  Commonly known as:  GLUCOTROL  TAKE 1 TABLET BY MOUTH TWICE A DAY BEFORE MEALS  What changed:    · how much to take  · how to take this  · when to take this        CONTINUE taking these medications    acetaminophen 500 MG tablet  Commonly known as:  TYLENOL  Take 500 mg by mouth every 6 (six) hours as needed for Pain.     allopurinol 300 MG tablet  Commonly known as:  ZYLOPRIM  TAKE 1 TABLET BY MOUTH TWICE A DAY     aspirin 81 MG Chew  Take 81 mg by mouth once daily.     atorvastatin 80 MG tablet  Commonly known as:  LIPITOR  Take 80 mg by mouth once daily.     blood sugar diagnostic Strp  To check BG 1 time daily, to use with insurance preferred meter     blood-glucose meter kit  To check BG 1 time daily, to use with insurance preferred meter     fenofibrate 48 MG tablet  Commonly known as:  TRICOR  Take 1 tablet (48 mg total) by mouth once daily.     furosemide 20 MG tablet  Commonly known as:  LASIX  Take 20 mg by mouth once daily.     gabapentin 300 MG capsule  Commonly known as:  NEURONTIN  Take 1 capsule (300 mg total) by mouth once daily.     lancets Misc  To check BG 1 time daily, to use with insurance preferred meter     lidocaine 5  %  Commonly known as:  LIDODERM  Place 1 patch onto the skin once daily. Place patch on skin for 12 hours then remove & discard patch within 12 hours or as directed by MD     lisinopril 20 MG tablet  Commonly known as:  PRINIVIL,ZESTRIL  TAKE 1 TABLET (20 MG TOTAL) BY MOUTH ONCE DAILY.     pantoprazole 40 MG tablet  Commonly known as:  PROTONIX  Take 40 mg by mouth once daily.          Discharge Procedure Orders   Call MD for:  temperature >100.4     Call MD for:  persistent nausea and vomiting or diarrhea     Call MD for:  severe uncontrolled pain     Call MD for:  redness, tenderness, or signs of infection (pain, swelling, redness, odor or green/yellow discharge around incision site)     Call MD for:  difficulty breathing or increased cough     Call MD for:  severe persistent headache        Follow up with MD in 2-3 weeks    Discharge Procedure Orders (must include Diet, Follow-up, Activity):   Discharge Procedure Orders (must include Diet, Follow-up, Activity)   Call MD for:  temperature >100.4     Call MD for:  persistent nausea and vomiting or diarrhea     Call MD for:  severe uncontrolled pain     Call MD for:  redness, tenderness, or signs of infection (pain, swelling, redness, odor or green/yellow discharge around incision site)     Call MD for:  difficulty breathing or increased cough     Call MD for:  severe persistent headache

## 2019-05-07 NOTE — PLAN OF CARE
Pt states ready to go home , stable, tolerating fluids. Denies pain. Injection site BLAYNE no drainage noted. Ambulated to car accompanied by nurse. Home w/ daughter.

## 2019-05-07 NOTE — DISCHARGE INSTRUCTIONS
Before leaving, please make sure you have all your personal belongings such as glasses, purses, wallets, keys, cell phones, jewelry, jackets etc     Pain injection instructions:     This procedure may take a couple weeks to relieve pain  You may get some pain relief from the local anesthetic initally.    No driving for 24 hrs.   Activity as tolerated- gradually increase activities.  Dont lift over 10 lbs for 24 hrs   No heat at injection sites x 2 days. No heating pads, hot tubs, saunas, or swimming in any body of water or pool for 2 days.  Use ice pack for mild swelling and for comfort , apply for 20 minutes, remove for 20 minute intervals. No direct contact of ice itself  to skin.  May shower today. No tub baths for two days.      Resume Aspirin, Plavix, or Coumadin the day after the procedure unless otherwise instructed.   If diabetic,monitor your glucose carefully as steroids can increase your glucose level    Seek immediate medical help for:   Severe increase in your usual pain or appearance of new pain.  Prolonged (mor than 8 hours) or increasing weakness or numbness in the legs or arms. Numbing medicine was injected and can affect the messages to and from the brain and legs or arms.  .    Fever above 101 ,Drainage,redness,active bleeding, or increased swelling at the injection site.  Headache, shortness of breath, chest pain, or breathing problems.        Recovery After Procedural Sedation (Adult)  You have been given medicine by vein to make you sleep during your surgery. This may have included both a pain medicine and sleeping medicine. Most of the effects have worn off. But you may still have some drowsiness for the next 6 to 8 hours.  Home care  Follow these guidelines when you get home:  · For the next 8 hours, you should be watched by a responsible adult. This person should make sure your condition is not getting worse.  · Don't drink any alcohol for the next 24 hours.  · Don't drive, operate dangerous  machinery, or make important business or personal decisions during the next 24 hours.  Note: Your healthcare provider may tell you not to take any medicine by mouth for pain or sleep in the next 4 hours. These medicines may react with the medicines you were given in the hospital. This could cause a much stronger response than usual.  Follow-up care  Follow up with your healthcare provider if you are not alert and back to your usual level of activity within 12 hours.  When to seek medical advice  Call your healthcare provider right away if any of these occur:  · Drowsiness gets worse  · Weakness or dizziness gets worse  · Repeated vomiting  · You can't be awakened   Date Last Reviewed: 10/18/2016  © 8652-4550 [employer]. [address] [place] [address], [place] [address]. All rights reserved. This information is not intended as a substitute for professional medical care. Always follow your healthcare professional's instructions.

## 2019-05-07 NOTE — OP NOTE
PROCEDURE DATE: 5/7/2019    PROCEDURE: Right L3-4 and L4-5 transforaminal epidural steroid injection under fluoroscopy    DIAGNOSIS: Lumbar disc displacement without myelopathy  Post op diagnosis: Same    PHYSICIAN: Prince Clemente MD    MEDICATIONS INJECTED:  Dexamethasone 5mg (0.5ml) and 1.5ml 0.25% bupivicaine at each nerve root.     LOCAL ANESTHETIC INJECTED:  Lidocaine 1%. 2 ml per site.    SEDATION MEDICATIONS: RN IV sedation    ESTIMATED BLOOD LOSS:  NOne    COMPLICATIONS:  NOne    TECHNIQUE:   A time-out was taken to identify patient and procedure side prior to starting the procedure. The patient was placed in a prone position, prepped and draped in the usual sterile fashion using ChloraPrep and sterile towels.  The area to be injected was determined under fluoroscopic guidance in AP and oblique view.  Local anesthetic was given by raising a wheal and going down to the hub of a 25-gauge 1.5 inch needle.  In oblique view, a 5 inch 22-gauge bent-tip spinal needle was introduced towards 6 oclock position of the pedicle of each above named nerve root level.  The needle was walked medially then hinged into the neural foramen and position was confirmed in AP and lateral views.  1ml contrast dye was injected to confirm appropriate placement and that there was no vascular uptake.  After negative aspiration for blood or CSF, the medication was then injected. This was performed at the right L3-4 and L4-5 level(s). The patient tolerated the procedure well.    The patient was monitored after the procedure.  Patient was given post procedure and discharge instructions to follow at home. The patient was discharged in a stable condition.

## 2019-05-08 ENCOUNTER — OFFICE VISIT (OUTPATIENT)
Dept: FAMILY MEDICINE | Facility: CLINIC | Age: 69
End: 2019-05-08
Payer: MEDICARE

## 2019-05-08 VITALS
SYSTOLIC BLOOD PRESSURE: 110 MMHG | HEART RATE: 68 BPM | TEMPERATURE: 99 F | WEIGHT: 276.69 LBS | DIASTOLIC BLOOD PRESSURE: 69 MMHG | BODY MASS INDEX: 40.98 KG/M2 | HEIGHT: 69 IN

## 2019-05-08 DIAGNOSIS — K76.0 NAFLD (NONALCOHOLIC FATTY LIVER DISEASE): ICD-10-CM

## 2019-05-08 DIAGNOSIS — R19.7 DIARRHEA, UNSPECIFIED TYPE: ICD-10-CM

## 2019-05-08 DIAGNOSIS — I15.2 HYPERTENSION ASSOCIATED WITH DIABETES: ICD-10-CM

## 2019-05-08 DIAGNOSIS — E11.69 HYPERLIPIDEMIA ASSOCIATED WITH TYPE 2 DIABETES MELLITUS: ICD-10-CM

## 2019-05-08 DIAGNOSIS — E66.01 OBESITY, CLASS III, BMI 40-49.9 (MORBID OBESITY): ICD-10-CM

## 2019-05-08 DIAGNOSIS — E11.29 TYPE 2 DIABETES MELLITUS WITH MICROALBUMINURIA, WITHOUT LONG-TERM CURRENT USE OF INSULIN: Primary | ICD-10-CM

## 2019-05-08 DIAGNOSIS — R80.9 TYPE 2 DIABETES MELLITUS WITH MICROALBUMINURIA, WITHOUT LONG-TERM CURRENT USE OF INSULIN: Primary | ICD-10-CM

## 2019-05-08 DIAGNOSIS — E78.5 HYPERLIPIDEMIA ASSOCIATED WITH TYPE 2 DIABETES MELLITUS: ICD-10-CM

## 2019-05-08 DIAGNOSIS — E11.59 HYPERTENSION ASSOCIATED WITH DIABETES: ICD-10-CM

## 2019-05-08 PROCEDURE — 3288F FALL RISK ASSESSMENT DOCD: CPT | Mod: CPTII,S$GLB,, | Performed by: FAMILY MEDICINE

## 2019-05-08 PROCEDURE — 1100F PR PT FALLS ASSESS DOC 2+ FALLS/FALL W/INJURY/YR: ICD-10-PCS | Mod: CPTII,S$GLB,, | Performed by: FAMILY MEDICINE

## 2019-05-08 PROCEDURE — 99214 OFFICE O/P EST MOD 30 MIN: CPT | Mod: S$GLB,,, | Performed by: FAMILY MEDICINE

## 2019-05-08 PROCEDURE — 3078F DIAST BP <80 MM HG: CPT | Mod: CPTII,S$GLB,, | Performed by: FAMILY MEDICINE

## 2019-05-08 PROCEDURE — 3078F PR MOST RECENT DIASTOLIC BLOOD PRESSURE < 80 MM HG: ICD-10-PCS | Mod: CPTII,S$GLB,, | Performed by: FAMILY MEDICINE

## 2019-05-08 PROCEDURE — 3044F HG A1C LEVEL LT 7.0%: CPT | Mod: CPTII,S$GLB,, | Performed by: FAMILY MEDICINE

## 2019-05-08 PROCEDURE — 3044F PR MOST RECENT HEMOGLOBIN A1C LEVEL <7.0%: ICD-10-PCS | Mod: CPTII,S$GLB,, | Performed by: FAMILY MEDICINE

## 2019-05-08 PROCEDURE — 1100F PTFALLS ASSESS-DOCD GE2>/YR: CPT | Mod: CPTII,S$GLB,, | Performed by: FAMILY MEDICINE

## 2019-05-08 PROCEDURE — 3074F PR MOST RECENT SYSTOLIC BLOOD PRESSURE < 130 MM HG: ICD-10-PCS | Mod: CPTII,S$GLB,, | Performed by: FAMILY MEDICINE

## 2019-05-08 PROCEDURE — 99999 PR PBB SHADOW E&M-EST. PATIENT-LVL V: CPT | Mod: PBBFAC,,, | Performed by: FAMILY MEDICINE

## 2019-05-08 PROCEDURE — 3074F SYST BP LT 130 MM HG: CPT | Mod: CPTII,S$GLB,, | Performed by: FAMILY MEDICINE

## 2019-05-08 PROCEDURE — 99999 PR PBB SHADOW E&M-EST. PATIENT-LVL V: ICD-10-PCS | Mod: PBBFAC,,, | Performed by: FAMILY MEDICINE

## 2019-05-08 PROCEDURE — 99214 PR OFFICE/OUTPT VISIT, EST, LEVL IV, 30-39 MIN: ICD-10-PCS | Mod: S$GLB,,, | Performed by: FAMILY MEDICINE

## 2019-05-08 PROCEDURE — 3288F PR FALLS RISK ASSESSMENT DOCUMENTED: ICD-10-PCS | Mod: CPTII,S$GLB,, | Performed by: FAMILY MEDICINE

## 2019-05-08 RX ORDER — METHOCARBAMOL 500 MG/1
500 TABLET, FILM COATED ORAL 2 TIMES DAILY PRN
Qty: 30 TABLET | Refills: 3 | Status: SHIPPED | OUTPATIENT
Start: 2019-05-08 | End: 2019-05-18

## 2019-05-08 RX ORDER — TRAMADOL HYDROCHLORIDE 50 MG/1
50 TABLET ORAL EVERY 6 HOURS PRN
Qty: 30 TABLET | Refills: 1 | Status: SHIPPED | OUTPATIENT
Start: 2019-05-08

## 2019-05-08 RX ORDER — AMOXICILLIN 500 MG
CAPSULE ORAL DAILY
COMMUNITY

## 2019-05-08 NOTE — PROGRESS NOTES
CHIEF COMPLAINT: follow up type 2 DM, HTN, hyperlipidemia      HISTORY OF PRESENT ILLNESS:  Myron Noel is a 68 y.o. male patient who presents to clinic for follow up on his chronic medical conditions. He is accompanied by his daughter.     1. Type 2 DM, microalbuminuria: A last  HgA1c was 6.7%. He was on precose, glucotrol. However he stopped taking his precose and has only been taking one glucotrol tablet daily. He is on an ACE-I, statin, ASA. He had evidence of microalbuminuria and is established with Dr. De Anda. He is up to date on his immunizations. He follows up with an ophthalmologist.  He is up to date on his foot exam.     2. Hyperlipidemia: He is on lipitor, fenofibrate, ASA. He denies any dark colored urine, myalgia. He is due for labs.    3. HTN: He is on lisinopril  and lasix. He denies any CP, cough, SOB.             REVIEW OF SYSTEMS:  The patient denies any fever, chills, night sweats, headaches, vision changes, difficulty speaking or swallowing, decreased hearing, weight loss, weight gain, chest pain, palpitations, shortness of breath, cough, nausea, vomiting, abdominal pain, dysuria, constipation, hematuria, hematochezia, melena, changes in his hair, skin,, numbness or weakness in his extremities, over any of his joints, myalgia, swollen glands, easy bruising, fatigue, edema.       MEDICATIONS:   Reviewed and/or reconciled in EPIC    ALLERGIES:  Reviewed and/or reconciled in [facility]    PAST MEDICAL/SURGICAL HISTORY:   Past Medical History:   Diagnosis Date    Cataract     ou done//    Chronic kidney disease 2001    nephrectomy for obstructive stones    CKD (chronic kidney disease) stage 3, GFR 30-59 ml/min 5/12/2017    Dr. De Anda    Corneal abrasion, left     With leaf 40 yrs ago    Diabetes mellitus     Gout, chronic     Hypertension     Knee osteoarthritis 12/20/2013    Retinal detachment of left eye with multiple breaks 6/18/2018      Past Surgical History:   Procedure Laterality Date     APPENDECTOMY      CATARACT EXTRACTION  08/06/2014    od    CATARACT EXTRACTION W/  INTRAOCULAR LENS IMPLANT Left 11/5/14    ESOPHAGOGASTRODUODENOSCOPY (EGD) N/A 6/23/2015    [facility]ir [facility] at [facility] ENDO    EXCISION-PTERYGIUM Left 10/15/2014    [facility]    EYE SURGERY      NEPHRECTOMY      phaco/pciol Left 11/5/2014    [facility]    phaco/pciol  Right 8/6/2014    [facility] at [facility]    REPAIR, RETINAL DETACHMENT, WITH VITRECTOMY Left 4/10/2019    Performed by [facility] Gibson [facility] at [facility] 1ST FLR    REPAIR, RETINAL DETACHMENT, WITH VITRECTOMY Left 3/20/2019    [facility] at [facility]    REPAIR, RETINAL DETACHMENT, WITH VITRECTOMY Left 6/20/2018    [facility] at [facility]    SCLERAL BUCKLING Left 8/15/2018    Performed by [facility] at [facility]ST [facility]    TONSILLECTOMY         FAMILY HISTORY:    Family History   Problem Relation Age of Onset    Heart defect Father     Alzheimer's disease Maternal Aunt     Diabetes Maternal Grandmother     Melanoma Neg Hx     Psoriasis Neg Hx     Lupus Neg Hx     Eczema Neg Hx        SOCIAL HISTORY:    Social History     Socioeconomic History    Marital status: Single     Spouse name: Not on file    Number of children: Not on file    Years of education: Not on file    Highest education level: Not on file   Occupational History    Not on file   Social Needs    Financial resource strain: Not on file    Food insecurity:     Worry: Not on file     Inability: Not on file    Transportation needs:     Medical: Not on file     Non-medical: Not on file   Tobacco Use    Smoking status: Former Smoker     Types: Pipe    Smokeless tobacco: Never Used   Substance and Sexual Activity    Alcohol use: No     Alcohol/week: 0.0 oz    Drug use: No    Sexual  "activity: Yes     Partners: Female   Lifestyle    Physical activity:     Days per week: Not on file     Minutes per session: Not on file    Stress: Not on file   Relationships    Social connections:     Talks on phone: Not on file     Gets together: Not on file     Attends Mandaen service: Not on file     Active member of club or organization: Not on file     Attends meetings of clubs or organizations: Not on file     Relationship status: Not on file   Other Topics Concern    Not on file   Social History Narrative    Not on file       PHYSICAL EXAM:  VITAL SIGNS:   Vitals:    05/08/19 1402   BP: 110/69   Pulse: 68   Temp: 98.6 °F (37 °C)   Weight: 125.5 kg (276 lb 10.8 oz)   Height: 5' 9" (1.753 m)     GENERAL:  Patient appears well nourished, sitting on exam table, in no acute distress.  HEENT:  Atraumatic, normocephalic, PERRLA, EOMI, no conjunctival injection, sclerae are anicteric, normal external auditory canals,TMs clear b/l, gross hearing intact to whisper, MMM, no oropharygneal erythema or exudate.  NECK:  Supple, normal ROM, trachea is midline , no supraclavicular or cervical LAD or masses palpated.   CARDIOVASCULAR:  RRR, normal S1 and S2, no m/r/g.  RESPIRATORY:  CTA b/l, no wheezes, rhonchi, rales.  No increased work of breathing, no  use of accessory muscles.  ABDOMEN:  Soft, nontender, nondistended, normoactive bowel sounds in all four quadrants, no rebound or guarding, no HSM or masses palpated.  Normal percussion.  EXTREMITIES:  2+ DP pulses b/l, no edema.  SKIN:  Warm, no lesions on exposed skin.  NEUROMUSCULAR:  Cranial nerves II-XII grossly intact. There is redness, swelling, erythema over dorsal surface of left foot. No clubbing or cyanosis of digits/nails, there is onychomycosis of finger and toenails  Steady gait.  PSYCH:  Patient is alert and oriented to person, time, place. They are appropriately dressed and groomed. There is normal eye contact. Rate and tone of speech is normal. " Normal insight, judgement. Normal thought content and process.       LABORATORY/IMAGING STUDIES: pending    ASSESSMENT/PLAN: This is a 68 y.o. male who presents to clinic for evaluation of the following concerns  1. Type 2 diabetes mellitus with microalbuminuria, without long-term current use of insulin  See below    2. Hypertension associated with diabetes  See below    3. Hyperlipidemia associated with type 2 diabetes mellitus  -cmp, lipid panel    4. Obesity, Class III, BMI 40-49.9 (morbid obesity)  See below    Patient readiness: acceptance and barriers:none    During the course of the visit the patient was educated and counseled about the following:     Diabetes:  lipid panel, CMP.  Hypertension: continue with current medications.  Obesity:   General weight loss/lifestyle modification strategies discussed (elicit support from others; identify saboteurs; non-food rewards, etc).  Diet interventions: moderate (500 kCal/d) deficit diet.  Informal exercise measures discussed, e.g. taking stairs instead of elevator.  Regular aerobic exercise program discussed.    Goals: Diabetes: Maintain Hemoglobin A1C below 7, Hypertension: Reduce Blood Pressure and Obesity: Reduce calorie intake and BMI    Did patient meet goals/outcomes: No    The following self management tools provided: declined    Patient Instructions (the written plan) was given to the patient/family.     Time spent with patient: 30 minutes      FOLLOW UP: 6 months      Adelaida Mckoy MD

## 2019-05-09 VITALS
WEIGHT: 274 LBS | HEIGHT: 69 IN | TEMPERATURE: 99 F | HEART RATE: 66 BPM | OXYGEN SATURATION: 94 % | DIASTOLIC BLOOD PRESSURE: 60 MMHG | SYSTOLIC BLOOD PRESSURE: 129 MMHG | RESPIRATION RATE: 20 BRPM | BODY MASS INDEX: 40.58 KG/M2

## 2019-05-15 ENCOUNTER — HOSPITAL ENCOUNTER (OUTPATIENT)
Dept: RADIOLOGY | Facility: CLINIC | Age: 69
Discharge: HOME | End: 2019-05-15
Attending: FAMILY MEDICINE
Payer: MEDICARE

## 2019-05-15 DIAGNOSIS — K76.0 NAFLD (NONALCOHOLIC FATTY LIVER DISEASE): ICD-10-CM

## 2019-05-15 PROCEDURE — 76700 US EXAM ABDOM COMPLETE: CPT | Mod: TC,PO

## 2019-05-15 PROCEDURE — 76700 US EXAM ABDOM COMPLETE: CPT | Mod: 26,,, | Performed by: RADIOLOGY

## 2019-05-15 PROCEDURE — 76700 US ABDOMEN COMPLETE: ICD-10-PCS | Mod: 26,,, | Performed by: RADIOLOGY

## 2019-05-16 ENCOUNTER — OFFICE VISIT (OUTPATIENT)
Dept: OPHTHALMOLOGY | Facility: CLINIC | Age: 69
End: 2019-05-16
Payer: MEDICARE

## 2019-05-16 DIAGNOSIS — H33.022 RETINAL DETACHMENT OF LEFT EYE WITH MULTIPLE BREAKS: Primary | ICD-10-CM

## 2019-05-16 DIAGNOSIS — H43.813 POSTERIOR VITREOUS DETACHMENT, BILATERAL: ICD-10-CM

## 2019-05-16 DIAGNOSIS — H35.22 PROLIFERATIVE VITREORETINOPATHY, LEFT: ICD-10-CM

## 2019-05-16 PROCEDURE — 99999 PR PBB SHADOW E&M-EST. PATIENT-LVL II: ICD-10-PCS | Mod: PBBFAC,,, | Performed by: OPHTHALMOLOGY

## 2019-05-16 PROCEDURE — 92134 POSTERIOR SEGMENT OCT RETINA (OCULAR COHERENCE TOMOGRAPHY)-BOTH EYES: ICD-10-PCS | Mod: S$GLB,,, | Performed by: OPHTHALMOLOGY

## 2019-05-16 PROCEDURE — 99024 PR POST-OP FOLLOW-UP VISIT: ICD-10-PCS | Mod: S$GLB,,, | Performed by: OPHTHALMOLOGY

## 2019-05-16 PROCEDURE — 92134 CPTRZ OPH DX IMG PST SGM RTA: CPT | Mod: S$GLB,,, | Performed by: OPHTHALMOLOGY

## 2019-05-16 PROCEDURE — 99999 PR PBB SHADOW E&M-EST. PATIENT-LVL II: CPT | Mod: PBBFAC,,, | Performed by: OPHTHALMOLOGY

## 2019-05-16 PROCEDURE — 99024 POSTOP FOLLOW-UP VISIT: CPT | Mod: S$GLB,,, | Performed by: OPHTHALMOLOGY

## 2019-05-16 NOTE — PROGRESS NOTES
HPI     Post-op Evaluation      Additional comments: 1 mon po chk              Comments     Patient states his letf eye is still a little sore but doing well.    No gtts      Prior OCT - OD ME  OS  Fovea flat  Inferior macula fluid - some improvement        A/P    1. RRD OS with multiple breaks    s/p 25g PPV/AFx/EL/C3F8 OS for RRD 6/20/28 8/13/18 - Recurrent inferior RRD with PVR OS    s/p /270 25g PPV/membrane stripping/EL/AFx/1000cs Katt oil OS for RRD with PVR 8/15/18    10/18 - inferior RD beneath oil   12/18 -stable    s/p 25g PPV/SO removal/inferior laser retinopexy (from midphery to ora)/AFx/C3F8 OS for RRD 3/20/19    Recurrent RD beneath Gas      S/p  25g PPV/inferior retinectomy/AFx/EL/5000cs OS  For REcurrent RRD with PVR 4/10/19    Doing well, iop ok      F/U 3 month OCT    2. PCIOL OU    3. PVD OD    4. DM  No DR  BS/BP/chol control

## 2019-05-19 ENCOUNTER — HOSPITAL ENCOUNTER (EMERGENCY)
Facility: HOSPITAL | Age: 69
Discharge: HOME | End: 2019-05-19
Attending: EMERGENCY MEDICINE
Payer: MEDICARE

## 2019-05-19 VITALS
HEIGHT: 69 IN | BODY MASS INDEX: 40.58 KG/M2 | WEIGHT: 274 LBS | DIASTOLIC BLOOD PRESSURE: 70 MMHG | HEART RATE: 90 BPM | TEMPERATURE: 98 F | OXYGEN SATURATION: 95 % | RESPIRATION RATE: 18 BRPM | SYSTOLIC BLOOD PRESSURE: 136 MMHG

## 2019-05-19 DIAGNOSIS — M25.569 KNEE PAIN: ICD-10-CM

## 2019-05-19 DIAGNOSIS — S83.421A SPRAIN OF LATERAL COLLATERAL LIGAMENT OF RIGHT KNEE, INITIAL ENCOUNTER: Primary | ICD-10-CM

## 2019-05-19 PROCEDURE — 29505 APPLICATION LONG LEG SPLINT: CPT | Mod: RT

## 2019-05-19 PROCEDURE — 99283 EMERGENCY DEPT VISIT LOW MDM: CPT | Mod: 25

## 2019-05-19 RX ORDER — OXYCODONE HYDROCHLORIDE 5 MG/1
10 TABLET ORAL
Status: DISCONTINUED | OUTPATIENT
Start: 2019-05-19 | End: 2019-05-19 | Stop reason: HOSPADM

## 2019-05-19 NOTE — ED PROVIDER NOTES
Encounter Date: 5/19/2019    SCRIBE #1 NOTE: IBlanka, am scribing for, and in the presence of, Dr. Deacon Chiu.       History     Chief Complaint   Patient presents with    Knee Injury     pt reports fell and injuried R knee        Time seen by provider: 2:46 PM on 05/19/2019    Myron Noel is a 69 y.o. male who presents to the ED accompanied by her spouse with complaints of right knee pain s/p a mechanical fall that occurred this morning. The patient endorses losing his balance while getting into his car causing him to land on his right knee. Pain is exacerbated with movement and bearing weight. He reports having a history of gout in the right knee. He denies new back pain, numbness, weakness, and onset of any other new symptoms currently. The patient has no other medical concerns or complaints at this moment. PMHx of HTN, gout, CKD, DM, and knee osteoarthritis. SHx includes appendectomy, nephrectomy, and right transforaminal epidural injection of steroid. Known drug allergies includes metformin.     The history is provided by the patient.     Review of patient's allergies indicates:   Allergen Reactions    Metformin      GI distress    Venom-honey bee Other (See Comments)     Passed out     Past Medical History:   Diagnosis Date    Cataract     ou done//    Chronic kidney disease 2001    nephrectomy for obstructive stones    CKD (chronic kidney disease) stage 3, GFR 30-59 ml/min 5/12/2017    Dr. De Anda    Corneal abrasion, left     With leaf 40 yrs ago    Diabetes mellitus     Gout, chronic     Hypertension     Knee osteoarthritis 12/20/2013    Retinal detachment of left eye with multiple breaks 6/18/2018     Past Surgical History:   Procedure Laterality Date    APPENDECTOMY      CATARACT EXTRACTION  08/06/2014    od    CATARACT EXTRACTION W/  INTRAOCULAR LENS IMPLANT Left 11/5/14    ESOPHAGOGASTRODUODENOSCOPY (EGD) N/A 6/23/2015    [facility] ENDO     EXCISION-PTERYGIUM Left 10/15/2014    [facility] OR    EYE SURGERY      Injection,steroid,epidural,transforaminal approach L3-4, L4-5 Right 5/7/2019    Performed by Prince Clemente MD at [facility] OR    NEPHRECTOMY      phaco/pciol Left 11/5/2014    [facility] by [facility] at [facility] OR    phaco/pciol  Right 8/6/2014    [facility] at [facility] OR    REPAIR, RETINAL DETACHMENT, WITH VITRECTOMY Left 4/10/2019    [facility] at [facility] OR 1ST FLR    REPAIR, RETINAL DETACHMENT, WITH VITRECTOMY Left 3/20/2019    Performed by [facility] at [facility] OR 1ST FLR    REPAIR, RETINAL DETACHMENT, WITH VITRECTOMY Left 6/20/2018    Performed by [facility] at [facility] OR 1ST FLR    SCLERAL BUCKLING Left 8/15/2018    [facility] by [facility] at [facility] OR 1ST FLR    TONSILLECTOMY       Family History   Problem Relation Age of Onset    Heart defect Father     Alzheimer's disease Maternal Aunt     Diabetes Maternal Grandmother     Melanoma Neg Hx     Psoriasis Neg Hx     Lupus Neg Hx     Eczema Neg Hx      Social History     Tobacco Use    Smoking status: Former Smoker     Types: Pipe    Smokeless tobacco: Never Used   Substance Use Topics    Alcohol use: No     Alcohol/week: 0.0 oz    Drug use: No     Review of Systems   Constitutional: Negative for activity change and fever.   Respiratory: Negative for cough.    Cardiovascular: Negative for chest pain.   Gastrointestinal: Negative for abdominal pain, nausea and vomiting.   Genitourinary: Negative for difficulty urinating.   Musculoskeletal: Positive for arthralgias (right knee). Negative for back pain, gait problem and neck pain.   Skin: Negative for pallor and rash.   Neurological: Negative for weakness and numbness.   Hematological: Does not bruise/bleed easily.   Psychiatric/Behavioral: The patient is not nervous/anxious.        Physical  Exam     Initial Vitals [05/19/19 1432]   BP Pulse Resp Temp SpO2   136/70 90 18 98 °F (36.7 °C) 95 %      MAP       --         Physical Exam    Nursing note and vitals reviewed.  Constitutional: He appears well-developed and well-nourished.   HENT:   Head: Normocephalic and atraumatic.   Eyes: Conjunctivae are normal.   Neck: Normal range of motion. Neck supple.   Cardiovascular: Normal rate, regular rhythm and normal heart sounds. Exam reveals no gallop and no friction rub.    No murmur heard.  Pulses:       Dorsalis pedis pulses are 2+ on the right side, and 2+ on the left side.        Posterior tibial pulses are 2+ on the right side, and 2+ on the left side.   Pulmonary/Chest: Breath sounds normal. No respiratory distress. He has no wheezes. He has no rhonchi. He has no rales.   Musculoskeletal: Normal range of motion. He exhibits tenderness. He exhibits no edema.        Right knee: He exhibits no LCL laxity and no MCL laxity. Tenderness found. Lateral joint line tenderness noted.   Right lateral joint line tenderness. No laxity noted. Negative anterior drawer test. 5/5 strength and sensation to BLE. 2+ DP/PT pulses.    Neurological: He is alert and oriented to person, place, and time. He has normal strength.   Skin: Skin is warm and dry.   Psychiatric: He has a normal mood and affect.         ED Course   Procedures  Labs Reviewed - No data to display       Imaging Results          X-Ray Knee 3 View Right (Final result)  Result time 05/19/19 15:04:22    Final result by Naren Barone MD (05/19/19 15:04:22)                 Impression:      No acute osseous abnormality.  Mild soft tissue swelling.      Electronically signed by: Naren Barone MD  Date:    05/19/2019  Time:    15:04             Narrative:    EXAMINATION:  XR KNEE 3 VIEW RIGHT    CLINICAL HISTORY:  Pain in unspecified knee    TECHNIQUE:  AP, lateral, and Merchant views of the right knee were  performed.    COMPARISON:  12/30/2013    FINDINGS:  No fracture, dislocation, or joint effusion.  There is moderate lateral patellar tilt.  Mild degenerative changes are present.  There is a small quadriceps enthesophyte.  There is mild generalized soft tissue swelling about the knee and calf.                                 Medical Decision Making:   History:   Old Medical Records: I decided to obtain old medical records.  Independently Interpreted Test(s):   I have ordered and independently interpreted X-rays - see summary below.       <> Summary of X-Ray Reading(s): Right knee x-rays independently interpreted by me demonstrate no fracture or dislocation.  Clinical Tests:   Radiological Study: Reviewed and Ordered  ED Management:  69-year-old male presents with right lateral knee pain.  The symptoms are most consistent with a lateral collateral ligament strain.  X-rays failed to demonstrate any evidence of fracture.  He is placed in a knee immobilizer and referred to Orthopedic surgery.       APC / Resident Notes:   I, Dr. Deacon Chiu III, personally performed the services described in this documentation. All medical record entries made by the scribe were at my direction and in my presence.  I have reviewed the chart and agree that the record reflects my personal performance and is accurate and complete       Scribe Attestation:   Scribe #1: I performed the above scribed service and the documentation accurately describes the services I performed. I attest to the accuracy of the note.               Clinical Impression:       ICD-10-CM ICD-9-CM   1. Sprain of lateral collateral ligament of right knee, initial encounter S83.421A 844.0   2. Knee pain M25.569 719.46         Disposition:   Disposition: Discharged  Condition: Stable                        Deacon Chiu III, MD  05/19/19 [id number]

## 2019-05-20 ENCOUNTER — TELEPHONE (OUTPATIENT)
Dept: FAMILY MEDICINE | Facility: CLINIC | Age: 69
End: 2019-05-20

## 2019-05-20 DIAGNOSIS — R16.0 HEPATOMEGALY: ICD-10-CM

## 2019-05-20 DIAGNOSIS — K76.0 NAFLD (NONALCOHOLIC FATTY LIVER DISEASE): Primary | ICD-10-CM

## 2019-05-20 NOTE — TELEPHONE ENCOUNTER
Abdominal ultrasound demonstrated an enlarged liver and fatty liver disease. He needs to eat a low fat/cholesterol diet, continuw ith his cholesterol medication and I would like him to meet with hepatology.

## 2019-05-21 ENCOUNTER — DOCUMENTATION ONLY (OUTPATIENT)
Dept: TRANSPLANT | Facility: CLINIC | Age: 69
End: 2019-05-21

## 2019-05-21 NOTE — NURSING
Pt records reviewed.   Pt will be referred to Hepatology.  Hepatomegaly  NAFLD (nonalcoholic fatty liver disease)  Initial referral received  from the [facility]que.   Referring Provider/diagnosis  Adelaida Mckoy MD      Referral letter sent to patient.

## 2019-05-21 NOTE — LETTER
May 21, 2019    Galo Noel  [address] [place] [address]  [place] [address]      Dear Galo Noel:    Your doctor has referred you to the [facility]. We are sending this letter to remind you to make an appointment with us to complete the referral process.     Please call us at [phone] to schedule an appointment. We look forward to seeing you soon.     If you received a call and have been scheduled, please disregard this letter.       Sincerely,        [facility]   [address] [place] [id number]  [phone]

## 2019-05-21 NOTE — LETTER
May 21, 2019    Adelaida Mckoy MD  [address] [facility][note 3] [address]      Dear Dr. Mckoy    Patient: Myron Noel   MR Number: [id number]   YOB: 1950     Thank you for the referral of Myron Noel to the [facility] program. An initial appointment will be scheduled for your patient with one of our Hepatologists.      Thank you again for your trust in our program.  If there is anything we can do for you or your staff, please feel free to contact us.        Sincerely,        [facility]  [address] [place] [id number]  [phone]

## 2019-05-23 ENCOUNTER — TELEPHONE (OUTPATIENT)
Dept: FAMILY MEDICINE | Facility: CLINIC | Age: 69
End: 2019-05-23

## 2019-05-23 ENCOUNTER — TELEPHONE (OUTPATIENT)
Dept: NEPHROLOGY | Facility: CLINIC | Age: 69
End: 2019-05-23

## 2019-05-23 NOTE — TELEPHONE ENCOUNTER
There is a telephone message from 5/20 regarding the ultrasound results. Please see that and schedule hepatology apt.

## 2019-05-23 NOTE — TELEPHONE ENCOUNTER
----- Message from Aida August sent at 5/23/2019  3:08 PM CDT -----  Type:   Appointment Request    Caller is requesting an appointment.     Name of Caller:  Jerry Noel   When is the first available appointment?     Symptoms:  Referred by Dr Mcoky   Best Call Back Number:  [phone]     Additional Information:       Merary Galindo(Resident)

## 2019-05-23 NOTE — TELEPHONE ENCOUNTER
----- Message from Parminder Evangelista sent at 5/23/2019  2:32 PM CDT -----  Type: Needs Medical Advice    Who Called:daughter -Britta Noel   Symptoms (please be specific): How long has patient had these symptoms:    Pharmacy name and phone #:  NA  Best Call Back Number: [phone]  Additional Information:

## 2019-05-23 NOTE — TELEPHONE ENCOUNTER
Pt daughter states they did get lab results from [facility], but that you had ordered the US. Pt daughter seen it online but wants to know what is the next step. Please advise they were asking for clarification on US.

## 2019-05-23 NOTE — TELEPHONE ENCOUNTER
----- Message from Vineet LUGO Kait sent at 5/23/2019  2:08 PM CDT -----  Contact: Wilbert/Britta  Type:  Test Results    Who Called:  Britta/Wilbert  Name of Test (Lab/Mammo/Etc):  Labs & Ultrasound  Date of Test:  5/15/19  Ordering Provider:  Ean  Where the test was performed:  [facility]  Best Call Back Number:  [phone]  Additional Information:  n/a

## 2019-05-23 NOTE — TELEPHONE ENCOUNTER
Spoke with daughter.  Patient sees Dr Maldonado for nephrology and does not need a nephrologist.      She said he needed hepatology, so I scheduled from the referral.

## 2019-05-24 ENCOUNTER — OFFICE VISIT (OUTPATIENT)
Dept: ORTHOPEDICS | Facility: CLINIC | Age: 69
End: 2019-05-24
Payer: MEDICARE

## 2019-05-24 ENCOUNTER — PATIENT MESSAGE (OUTPATIENT)
Dept: FAMILY MEDICINE | Facility: CLINIC | Age: 69
End: 2019-05-24

## 2019-05-24 VITALS
DIASTOLIC BLOOD PRESSURE: 79 MMHG | WEIGHT: 274 LBS | HEIGHT: 69 IN | BODY MASS INDEX: 40.58 KG/M2 | HEART RATE: 69 BPM | SYSTOLIC BLOOD PRESSURE: 157 MMHG

## 2019-05-24 DIAGNOSIS — M25.561 RIGHT KNEE PAIN, UNSPECIFIED CHRONICITY: Primary | ICD-10-CM

## 2019-05-24 DIAGNOSIS — M25.561 ACUTE PAIN OF RIGHT KNEE: Primary | ICD-10-CM

## 2019-05-24 DIAGNOSIS — S86.911A STRAIN OF RIGHT KNEE, INITIAL ENCOUNTER: ICD-10-CM

## 2019-05-24 PROCEDURE — 3078F PR MOST RECENT DIASTOLIC BLOOD PRESSURE < 80 MM HG: ICD-10-PCS | Mod: CPTII,S$GLB,, | Performed by: ORTHOPAEDIC SURGERY

## 2019-05-24 PROCEDURE — 99214 OFFICE O/P EST MOD 30 MIN: CPT | Mod: 25,S$GLB,, | Performed by: ORTHOPAEDIC SURGERY

## 2019-05-24 PROCEDURE — 20610 LARGE JOINT ASPIRATION/INJECTION: R KNEE: ICD-10-PCS | Mod: RT,S$GLB,, | Performed by: ORTHOPAEDIC SURGERY

## 2019-05-24 PROCEDURE — 99214 PR OFFICE/OUTPT VISIT, EST, LEVL IV, 30-39 MIN: ICD-10-PCS | Mod: 25,S$GLB,, | Performed by: ORTHOPAEDIC SURGERY

## 2019-05-24 PROCEDURE — 20610 DRAIN/INJ JOINT/BURSA W/O US: CPT | Mod: RT,S$GLB,, | Performed by: ORTHOPAEDIC SURGERY

## 2019-05-24 PROCEDURE — 3078F DIAST BP <80 MM HG: CPT | Mod: CPTII,S$GLB,, | Performed by: ORTHOPAEDIC SURGERY

## 2019-05-24 PROCEDURE — 1101F PR PT FALLS ASSESS DOC 0-1 FALLS W/OUT INJ PAST YR: ICD-10-PCS | Mod: CPTII,S$GLB,, | Performed by: ORTHOPAEDIC SURGERY

## 2019-05-24 PROCEDURE — 3077F PR MOST RECENT SYSTOLIC BLOOD PRESSURE >= 140 MM HG: ICD-10-PCS | Mod: CPTII,S$GLB,, | Performed by: ORTHOPAEDIC SURGERY

## 2019-05-24 PROCEDURE — 1101F PT FALLS ASSESS-DOCD LE1/YR: CPT | Mod: CPTII,S$GLB,, | Performed by: ORTHOPAEDIC SURGERY

## 2019-05-24 PROCEDURE — 3077F SYST BP >= 140 MM HG: CPT | Mod: CPTII,S$GLB,, | Performed by: ORTHOPAEDIC SURGERY

## 2019-05-24 PROCEDURE — 99999 PR PBB SHADOW E&M-EST. PATIENT-LVL III: ICD-10-PCS | Mod: PBBFAC,,, | Performed by: ORTHOPAEDIC SURGERY

## 2019-05-24 PROCEDURE — 99999 PR PBB SHADOW E&M-EST. PATIENT-LVL III: CPT | Mod: PBBFAC,,, | Performed by: ORTHOPAEDIC SURGERY

## 2019-05-24 RX ADMIN — TRIAMCINOLONE ACETONIDE 40 MG: 40 INJECTION, SUSPENSION INTRA-ARTICULAR; INTRAMUSCULAR at 09:05

## 2019-05-27 RX ORDER — GLIPIZIDE 10 MG/1
TABLET ORAL
Qty: 180 TABLET | Refills: 2 | Status: SHIPPED | OUTPATIENT
Start: 2019-05-27

## 2019-05-29 RX ORDER — TRIAMCINOLONE ACETONIDE 40 MG/ML
40 INJECTION, SUSPENSION INTRA-ARTICULAR; INTRAMUSCULAR
Status: DISCONTINUED | OUTPATIENT
Start: 2019-05-24 | End: 2019-05-29 | Stop reason: HOSPADM

## 2019-05-29 NOTE — PROCEDURES
Large Joint Aspiration/Injection: R knee  Date/Time: 5/24/2019 9:11 AM  Performed by: Edison Morgan MD  Authorized by: Edison Morgan MD     Consent Done?:  Yes (Verbal)  Indications:  Pain  Timeout: Prior to procedure the correct patient, procedure, and site was verified      Location:  Knee  Site:  R knee  Prep: Patient was prepped and draped in usual sterile fashion    Approach:  Anteromedial  Medications:  40 mg triamcinolone acetonide 40 mg/mL

## 2019-05-29 NOTE — PROGRESS NOTES
Past Medical History:   Diagnosis Date    Cataract     ou done//    Chronic kidney disease 2001    nephrectomy for obstructive stones    CKD (chronic kidney disease) stage 3, GFR 30-59 ml/min 5/12/2017    Dr. De Anda    Corneal abrasion, left     With leaf 40 yrs ago    Diabetes mellitus     Gout, chronic     Hypertension     Knee osteoarthritis 12/20/2013    Retinal detachment of left eye with multiple breaks 6/18/2018       Past Surgical History:   Procedure Laterality Date    APPENDECTOMY      CATARACT EXTRACTION  08/06/2014    od    CATARACT EXTRACTION W/  INTRAOCULAR LENS IMPLANT Left 11/5/14    ESOPHAGOGASTRODUODENOSCOPY (EGD) N/A 6/23/2015    [facility]O    EXCISION-PTERYGIUM Left 10/15/2014    [facility] at [facility] OR    EYE SURGERY      Injection,steroid,epidural,transforaminal approach L3-4, L4-5 Right 5/7/2019    Performed by Prince Clemente MD at [facility] OR    NEPHRECTOMY      phaco/pciol Left 11/5/2014    Performed by Aleja Spangler MD at [facility]    phaco/pciol  Right 8/6/2014    Performed by [facility] at [facility] OR    REPAIR, RETINAL DETACHMENT, WITH VITRECTOMY Left 4/10/2019    Performed by IRA Gibson [facility] at [facility] OR 1ST FLR    REPAIR, RETINAL DETACHMENT, WITH VITRECTOMY Left 3/20/2019    [facility] at [facility] OR 1ST FLR    REPAIR, RETINAL DETACHMENT, WITH VITRECTOMY Left 6/20/2018    Per[facility] at [facility] 1ST [facility]LR    SCLERAL BUCKLING Left 8/15/2018    Performed by IRA [facility] at [facility] OR 1ST FLR    TONSILLECTOMY         Current Outpatient Medications   Medication Sig    acetaminophen (TYLENOL) 500 MG tablet Take 500 mg by mouth every 6 (six) hours as needed for Pain.    allopurinol (ZYLOPRIM) 300 MG tablet TAKE 1 TABLET BY MOUTH TWICE A DAY    aspirin 81 MG Chew Take 81 mg by mouth once daily.    atorvastatin (LIPITOR) 80 MG  tablet Take 80 mg by mouth once daily.    blood sugar diagnostic Strp To check BG 1 time daily, to use with insurance preferred meter    blood-glucose meter kit To check BG 1 time daily, to use with insurance preferred meter    fenofibrate (TRICOR) 48 MG tablet Take 1 tablet (48 mg total) by mouth once daily.    fish oil-omega-3 fatty acids 300-1,000 mg capsule Take by mouth once daily.    furosemide (LASIX) 20 MG tablet Take 20 mg by mouth once daily.    gabapentin (NEURONTIN) 300 MG capsule Take 1 capsule (300 mg total) by mouth once daily.    lancets Misc To check BG 1 time daily, to use with insurance preferred meter    lisinopril (PRINIVIL,ZESTRIL) 20 MG tablet TAKE 1 TABLET (20 MG TOTAL) BY MOUTH ONCE DAILY.    pantoprazole (PROTONIX) 40 MG tablet Take 40 mg by mouth once daily.    traMADol (ULTRAM) 50 mg tablet Take 1 tablet (50 mg total) by mouth every 6 (six) hours as needed for Pain.    glipiZIDE (GLUCOTROL) 10 MG tablet TAKE 1 TABLET BY MOUTH TWICE A DAY BEFORE MEALS     No current facility-administered medications for this visit.        Review of patient's allergies indicates:   Allergen Reactions    Metformin      GI distress    Venom-honey bee Other (See Comments)     Passed out       Family History   Problem Relation Age of Onset    Heart defect Father     Alzheimer's disease Maternal Aunt     Diabetes Maternal Grandmother     Melanoma Neg Hx     Psoriasis Neg Hx     Lupus Neg Hx     Eczema Neg Hx        Social History     Socioeconomic History    Marital status: Single     Spouse name: Not on file    Number of children: Not on file    Years of education: Not on file    Highest education level: Not on file   Occupational History    Not on file   Social Needs    Financial resource strain: Not on file    Food insecurity:     Worry: Not on file     Inability: Not on file    Transportation needs:     Medical: Not on file     Non-medical: Not on file   Tobacco Use    Smoking  "status: Former Smoker     Types: Pipe    Smokeless tobacco: Never Used   Substance and Sexual Activity    Alcohol use: No     Alcohol/week: 0.0 oz    Drug use: No    Sexual activity: Yes     Partners: Female   Lifestyle    Physical activity:     Days per week: Not on file     Minutes per session: Not on file    Stress: Not on file   Relationships    Social connections:     Talks on phone: Not on file     Gets together: Not on file     Attends Orthodoxy service: Not on file     Active member of club or organization: Not on file     Attends meetings of clubs or organizations: Not on file     Relationship status: Not on file   Other Topics Concern    Not on file   Social History Narrative    Not on file       Chief Complaint:   Chief Complaint   Patient presents with    Right Knee - Pain       Consulting Physician: Self, Aaareferral    History of present illness:    This is a 69 y.o. male who complains of new onset moderate right knee pain since a fall on 05/19/2019 while trying to get into his car.  He reports his pain at a 5/10 and worse when bending and walking.  He was seen in the emergency room.    Review of Systems:    Constitution: Denies chills, fever, and sweats.  HENT: Denies headaches or blurry vision.  Cardiovascular: Denies chest pain or irregular heart beat.  Respiratory: Denies cough or shortness of breath.  Gastrointestinal: Denies abdominal pain, nausea, or vomiting.  Musculoskeletal:  Denies muscle cramps.  Neurological: Denies dizziness or focal weakness.  Psychiatric/Behavioral: Normal mental status.  Hematologic/Lymphatic: Denies bleeding problem or easy bruising/bleeding.  Skin: Denies rash or suspicious lesions.    Examination:    Vital Signs:    Vitals:    05/24/19 1102   BP: (!) 157/79   Pulse: 69   Weight: 124.3 kg (274 lb)   Height: 5' 9" (1.753 m)   PainSc:   5       Body mass index is 40.46 kg/m².    This a well-developed, well nourished patient in no acute distress.    Alert and " oriented x 3 and cooperative to examination.       Physical Exam: Right Knee Exam    Gait   Antalgic    Skin  Rash:   None  Scars:   None    Inspection  Erythema:  None  Bruising:  None  Effusion:  None  Masses:  None  Lymphadenopathy: None    Range of Motion: 0 to 110°    Medial Joint : Yes  Lateral Joint : Yes    Patellofemoral Tenderness: Yes  Patellofemoral Crepitus: Yes    Lachman:  Normal  Anterior Drawer: Normal  Posterior Drawer: Normal    Laura's:  Negative  Apley's:  Negative    Varus Stress:  Stable  Valgus Stress:  Stable    Strength:  5/5    Pulses:  Palpable  Sensation:  Intact          Imaging: X-rays ordered and images interpreted today personally by me of right knee shows moderate degenerative changes along with a possible fracture of a bone spur.         Assessment: Acute pain of right knee  -     Large Joint Aspiration/Injection: R knee        Plan:  His knee is stable at this point time will go ahead and start with an injection and physical therapy with us and I will see him back in 4 weeks time.      DISCLAIMER: This note may have been dictated using voice recognition software and may contain grammatical errors.     NOTE: Consult report sent to referring provider via [facility] [employer].

## 2019-06-04 ENCOUNTER — OFFICE VISIT (OUTPATIENT)
Dept: PAIN MEDICINE | Facility: CLINIC | Age: 69
End: 2019-06-04
Payer: MEDICARE

## 2019-06-04 VITALS
HEIGHT: 69 IN | SYSTOLIC BLOOD PRESSURE: 117 MMHG | HEART RATE: 68 BPM | BODY MASS INDEX: 40.58 KG/M2 | DIASTOLIC BLOOD PRESSURE: 62 MMHG | WEIGHT: 274 LBS

## 2019-06-04 DIAGNOSIS — M54.16 LUMBAR RADICULOPATHY: Primary | ICD-10-CM

## 2019-06-04 DIAGNOSIS — M48.062 SPINAL STENOSIS OF LUMBAR REGION WITH NEUROGENIC CLAUDICATION: ICD-10-CM

## 2019-06-04 DIAGNOSIS — M51.36 DDD (DEGENERATIVE DISC DISEASE), LUMBAR: ICD-10-CM

## 2019-06-04 PROCEDURE — 3078F DIAST BP <80 MM HG: CPT | Mod: CPTII,S$GLB,, | Performed by: PHYSICIAN ASSISTANT

## 2019-06-04 PROCEDURE — 99214 OFFICE O/P EST MOD 30 MIN: CPT | Mod: S$GLB,,, | Performed by: PHYSICIAN ASSISTANT

## 2019-06-04 PROCEDURE — 3074F SYST BP LT 130 MM HG: CPT | Mod: CPTII,S$GLB,, | Performed by: PHYSICIAN ASSISTANT

## 2019-06-04 PROCEDURE — 99214 PR OFFICE/OUTPT VISIT, EST, LEVL IV, 30-39 MIN: ICD-10-PCS | Mod: S$GLB,,, | Performed by: PHYSICIAN ASSISTANT

## 2019-06-04 PROCEDURE — 99499 UNLISTED E&M SERVICE: CPT | Mod: S$GLB,,, | Performed by: PHYSICIAN ASSISTANT

## 2019-06-04 PROCEDURE — 1101F PR PT FALLS ASSESS DOC 0-1 FALLS W/OUT INJ PAST YR: ICD-10-PCS | Mod: CPTII,S$GLB,, | Performed by: PHYSICIAN ASSISTANT

## 2019-06-04 PROCEDURE — 3078F PR MOST RECENT DIASTOLIC BLOOD PRESSURE < 80 MM HG: ICD-10-PCS | Mod: CPTII,S$GLB,, | Performed by: PHYSICIAN ASSISTANT

## 2019-06-04 PROCEDURE — 99999 PR PBB SHADOW E&M-EST. PATIENT-LVL III: ICD-10-PCS | Mod: PBBFAC,,, | Performed by: PHYSICIAN ASSISTANT

## 2019-06-04 PROCEDURE — 3074F PR MOST RECENT SYSTOLIC BLOOD PRESSURE < 130 MM HG: ICD-10-PCS | Mod: CPTII,S$GLB,, | Performed by: PHYSICIAN ASSISTANT

## 2019-06-04 PROCEDURE — 99999 PR PBB SHADOW E&M-EST. PATIENT-LVL III: CPT | Mod: PBBFAC,,, | Performed by: PHYSICIAN ASSISTANT

## 2019-06-04 PROCEDURE — 99499 RISK ADDL DX/OHS AUDIT: ICD-10-PCS | Mod: S$GLB,,, | Performed by: PHYSICIAN ASSISTANT

## 2019-06-04 PROCEDURE — 1101F PT FALLS ASSESS-DOCD LE1/YR: CPT | Mod: CPTII,S$GLB,, | Performed by: PHYSICIAN ASSISTANT

## 2019-06-04 RX ORDER — METHOCARBAMOL 500 MG/1
500 TABLET, FILM COATED ORAL 2 TIMES DAILY PRN
Refills: 3 | COMMUNITY
Start: 2019-05-23 | End: 2019-08-26 | Stop reason: SDUPTHER

## 2019-06-04 NOTE — PROGRESS NOTES
Referring Physician: No ref. provider found    PCP: Adelaida Mckoy MD      CC: low back pain    Interval History:  Myron Noel is a 69 y.o. male with chronic low back pain who presents today for f./u s/p lumbar TF JOANN on right at L3-4 and L4-5. Reports >50% relief. He is happy with results. Denies b/b changes. Denies LE weakness. Pain today is rated 7/10.  Pt has been seen in the clinic before, however pt is new to me.     History below per Dr. Clemente    HPI:   Myron Noel is a 69 y.o. male with PMHx of HTN, HLD, DM II, GERD, and chronic pain who presents with complaint of low back pain.  He states his pain began insidiously approximately 1 year that has become progressively worse, particularly over the past 2 months. He describes his pain as an intermittent lower lumbar pain with occasional radiation down the side of his leg to his ankle (R>L). He states his pain is exacerbated by standing from a seated position as well as sitting from a standing position, and spine extension. He says his pain is alleviated by spine flexion, rest, and lying on his side. He says he takes gabapentin, Robaxin, and Percocet 5-325 mg which he states does provide relief. He has also been prescribed Voltaren gel and Lidocaine patches, however, he has been unable to fill his prescription 2/2 insurance issues. He states he has never had spine surgery nor Pain Management interventions in the past. He also denies having seen Physical Therapy for his back pain in the past.     ROS:  CONSTITUTIONAL: No fevers, chills, night sweats, wt. loss, appetite changes  SKIN: no rashes or itching  ENT: No headaches, head trauma, vision changes, or eye pain  LYMPH NODES: None noticed   CV: No chest pain, palpitations.   RESP: No shortness of breath, dyspnea on exertion, cough, wheezing, or hemoptysis  GI: No nausea, emesis, diarrhea, constipation, melena, hematochezia, pain.    : No dysuria, hematuria, urgency, or frequency   HEME: No easy bruising,  bleeding problems  PSYCHIATRIC: No depression, anxiety, psychosis, hallucinations.  NEURO: No seizures, memory loss, dizziness or difficulty sleeping  MSK: lumbar joint pain      Past Medical History:   Diagnosis Date    Cataract     ou done//    Chronic kidney disease 2001    nephrectomy for obstructive stones    CKD (chronic kidney disease) stage 3, GFR 30-59 ml/min 5/12/2017    Dr. De Anda    Corneal abrasion, left     With leaf 40 yrs ago    Diabetes mellitus     Gout, chronic     Hypertension     Knee osteoarthritis 12/20/2013    Retinal detachment of left eye with multiple breaks 6/18/2018     Past Surgical History:   Procedure Laterality Date    APPENDECTOMY      CATARACT EXTRACTION  08/06/2014    od    CATARACT EXTRACTION W/  INTRAOCULAR LENS IMPLANT Left 11/5/14    ESOPHAGOGASTRODUODENOSCOPY (EGD) N/A 6/23/2015    [facility] ENDO    EXCISION-PTERYGIUM Left 10/15/2014    [facility]    EYE SURGERY      Injection,steroid,epidural,transforaminal approach L3-4, L4-5 Right 5/7/2019    Performed by Prince Clemente MD at [facility] OR    NEPHRECTOMY      phaco/pciol Left 11/5/2014    [facility] by [facility] at [facility] OR    phaco/pciol  Right 8/6/2014    [facility] at [facility] OR    REPAIR, RETINAL DETACHMENT, WITH VITRECTOMY Left 4/10/2019    [facility] at [facility] 1ST FLR    REPAIR, RETINAL DETACHMENT, WITH VITRECTOMY Left 3/20/2019    [facility] by [facility] at [facility] OR 1ST FLR    REPAIR, RETINAL DETACHMENT, WITH VITRECTOMY Left 6/20/2018    Performed by IRA [facility] at [facility] 1ST FLR    SCLERAL BUCKLING Left 8/15/2018    [facility] by IRA [facility] at [facility] OR 1ST FLR    TONSILLECTOMY       Family History   Problem Relation Age of Onset    Heart defect Father     Alzheimer's disease Maternal Aunt     Diabetes Maternal  "Grandmother     Melanoma Neg Hx     Psoriasis Neg Hx     Lupus Neg Hx     Eczema Neg Hx      Social History     Socioeconomic History    Marital status: Single     Spouse name: Not on file    Number of children: Not on file    Years of education: Not on file    Highest education level: Not on file   Occupational History    Not on file   Social Needs    Financial resource strain: Not on file    Food insecurity:     Worry: Not on file     Inability: Not on file    Transportation needs:     Medical: Not on file     Non-medical: Not on file   Tobacco Use    Smoking status: Former Smoker     Types: Pipe    Smokeless tobacco: Never Used   Substance and Sexual Activity    Alcohol use: No     Alcohol/week: 0.0 oz    Drug use: No    Sexual activity: Yes     Partners: Female   Lifestyle    Physical activity:     Days per week: Not on file     Minutes per session: Not on file    Stress: Not on file   Relationships    Social connections:     Talks on phone: Not on file     Gets together: Not on file     Attends Congregational service: Not on file     Active member of club or organization: Not on file     Attends meetings of clubs or organizations: Not on file     Relationship status: Not on file   Other Topics Concern    Not on file   Social History Narrative    Not on file         Medications/Allergies: See med card    Vitals:    06/04/19 1030   BP: 117/62   Pulse: 68   Weight: 124.3 kg (274 lb)   Height: 5' 9" (1.753 m)   PainSc:   7   PainLoc: Back         Physical exam:    GENERAL: A and O x3, the patient appears well groomed and is in no acute distress.  Skin: No rashes or obvious lesions  HEENT: normocephalic, atraumatic  CARDIOVASCULAR:  RRR  LUNGS: non labored breathing  ABDOMEN: soft, nontender   UPPER EXTREMITIES: Normal alignment, normal range of motion, no atrophy, no skin changes,  hair growth and nail growth normal and equal bilaterally. No swelling, no tenderness.    LOWER EXTREMITIES:  Normal " alignment, normal range of motion, no atrophy, no skin changes,  hair growth and nail growth normal and equal bilaterally. No swelling, no tenderness.    LUMBAR SPINE  Lumbar spine: ROM is full with flexion with no increased pain. ROM full with extension and oblique extension, however with moderate increased pain.    KEV and FADER negative bilaterally.   Supine straight leg raise is negative bilaterally.    Positive axial loading.   Internal and external rotation of the hip causes no increased pain on either side.  Pain to palpation over GT bursa on right.   Myofascial exam: No tenderness to palpation across lumbar paraspinous muscles.      MENTAL STATUS: normal orientation, speech, language, and fund of knowledge for social situation.  Emotional state appropriate.    CRANIAL NERVES:  II:  PERRL bilaterally,   III,IV,VI: EOMI.    V:  Facial sensation equal bilaterally  VII:  Facial motor function normal.  VIII:  Hearing equal to finger rub bilaterally  IX/X: Gag normal, palate symmetric  XI:  Shoulder shrug equal, head turn equal  XII:  Tongue midline without fasciculations      MOTOR: Tone and bulk: normal bilateral upper and lower Strength: normal   Delt Bi Tri WE WF     R 5 5 5 5 5 5   L 5 5 5 5 5 5     IP ADD ABD Quad TA Gas HAM  R 5 5 5 5 5 5 5  L 5 5 5 5 5 5 5    SENSATION: Light touch and pinprick intact bilaterally  REFLEXES: normal, symmetric, nonbrisk.  Toes down, no clonus. No hoffmans.  GAIT: Antalgic       Imaging:  MRI Lumbar Spine  FINDINGS:  Vertebral column: As seen on comparison plain films, there is extensive multilevel degenerative change including degenerative disc and facet arthropathy.  There is no fracture.  There is large anterior osteophyte formation throughout the lumbar spine.  There is marked disc space narrowing from L2-3 through the L5-S1 levels with mild disc space narrowing at the L1-2 level.  Extensive degenerative endplate signal changes most apparent at the L3-4 level where  there was sclerosis on plain films.  There is 3 mm retrolisthesis of L5 on S1 with 3 mm anterolisthesis of L4 on L5.  Baseline marrow signal is normal.    Spinal canal, conus, epidural space: The spinal canal may be borderline small on a developmental basis.  The conus terminates at the level of L1 and is normal in contour and signal intensity.    Findings by level:    On the sagittal images, there is no spinal stenosis or cord compression at the T10-11 or T11-12 levels.    T12-L1: There is facet joint arthropathy.  There is no spinal canal or significant foraminal stenosis.    L1-2: There is mild disc space narrowing.  There is a diffuse disc bulge with osteophytic ridging and there is mild-to-moderate facet joint arthropathy.  There is no significant spinal stenosis.  There is moderate right and mild left foraminal stenosis.    L2-3: There is marked disc space narrowing.  There is a diffuse disc bulge with osteophytic ridging and there is mild-to-moderate facet joint arthropathy.  There is flattening of the ventral dural sac.  There is borderline spinal stenosis with moderate to marked right and moderate left foraminal stenosis.    L3-4: There is marked disc space narrowing.  There is a moderate to marked diffuse disc bulge with osteophytic ridging and superimposed broad central disc protrusion with annular fissure.  There is marked facet joint arthropathy with ligamentum flavum thickening.  The result is severe spinal canal, left greater than right lateral recess stenosis as well as severe right and moderate to severe left foraminal stenosis.    L4-5: There is mild anterolisthesis of L4 on L5.  There is disc space narrowing, diffuse disc bulge with osteophytic ridging and superimposed broad left paracentral disc protrusion.  There is marked facet joint arthropathy with ligamentum flavum thickening.  There are facet joint effusions.  The result is severe spinal canal, left greater than right lateral recess  stenosis with severe left and moderate right foraminal stenosis.    L5-S1: There is marked disc space narrowing, mild retrolisthesis of L5 on S1, moderate to marked bilateral facet joint arthropathy, diffuse disc bulge with osteophytic ridging.  There is no significant spinal stenosis but there is severe right and moderate to severe left foraminal stenosis.    Soft tissues, other: The prevertebral soft tissues are grossly normal.  The posterior paraspinous muscles are atrophic.    Assessment:  Myron Noel is a 68 y.o. male with PMHx of HTN, HLD, DM II, GERD, and chronic pain who presents with  1. Lumbar radiculopathy    2. DDD (degenerative disc disease), lumbar    3. Spinal stenosis of lumbar region with neurogenic claudication        Plan:  1. I have stressed the importance of physical activity and exercise to improve overall health  2. Monitor progress and consider repeat lumbar epidural steroid injection to the Right L3-4 and L4-5 level(s).  3. F/u prn

## 2019-07-01 RX ORDER — ATORVASTATIN CALCIUM 80 MG/1
80 TABLET, FILM COATED ORAL DAILY
Qty: 30 TABLET | Refills: 0 | Status: SHIPPED | OUTPATIENT
Start: 2019-07-01 | End: 2019-07-11 | Stop reason: SDUPTHER

## 2019-07-01 RX ORDER — FENOFIBRATE 48 MG/1
48 TABLET, FILM COATED ORAL DAILY
Qty: 30 TABLET | Refills: 0 | Status: SHIPPED | OUTPATIENT
Start: 2019-07-01 | End: 2019-07-23 | Stop reason: SDUPTHER

## 2019-07-01 NOTE — TELEPHONE ENCOUNTER
LOV 5/8/19  FOV 11/11/19  Sending to on call                      ----- Message from Viviana King sent at 7/1/2019  3:02 PM CDT -----  Contact: Myron pt  Type: Needs Medical Advice    Who Called:  Myron  Pharmacy name and phone #:    [facility] - [place], [place] - [address] AT [address] [place] [address]  [facility] [address]  Phone: [phone] Fax: [phone]      Best Call Back Number: [phone]  Additional Information: Pls call pt regarding having his prescriptions mailed to him. Both of his cars are broken right now

## 2019-07-03 RX ORDER — ATORVASTATIN CALCIUM 80 MG/1
TABLET, FILM COATED ORAL
Qty: 90 TABLET | Refills: 3 | OUTPATIENT
Start: 2019-07-03

## 2019-07-11 RX ORDER — ATORVASTATIN CALCIUM 80 MG/1
80 TABLET, FILM COATED ORAL DAILY
Qty: 30 TABLET | Refills: 0 | Status: SHIPPED | OUTPATIENT
Start: 2019-07-11 | End: 2019-08-23 | Stop reason: SDUPTHER

## 2019-07-23 RX ORDER — FENOFIBRATE 48 MG/1
48 TABLET, FILM COATED ORAL DAILY
Qty: 30 TABLET | Refills: 0 | Status: SHIPPED | OUTPATIENT
Start: 2019-07-23 | End: 2019-08-31 | Stop reason: SDUPTHER

## 2019-08-13 RX ORDER — ATORVASTATIN CALCIUM 80 MG/1
TABLET, FILM COATED ORAL
Qty: 30 TABLET | Refills: 0 | OUTPATIENT
Start: 2019-08-13

## 2019-08-15 ENCOUNTER — OFFICE VISIT (OUTPATIENT)
Dept: OPHTHALMOLOGY | Facility: CLINIC | Age: 69
End: 2019-08-15
Payer: MEDICARE

## 2019-08-15 VITALS — SYSTOLIC BLOOD PRESSURE: 142 MMHG | HEART RATE: 59 BPM | DIASTOLIC BLOOD PRESSURE: 71 MMHG

## 2019-08-15 DIAGNOSIS — H33.002 RHEGMATOGENOUS RETINAL DETACHMENT OF LEFT EYE: ICD-10-CM

## 2019-08-15 DIAGNOSIS — H35.22 PROLIFERATIVE VITREORETINOPATHY, LEFT: ICD-10-CM

## 2019-08-15 DIAGNOSIS — H33.022 RETINAL DETACHMENT OF LEFT EYE WITH MULTIPLE BREAKS: Primary | ICD-10-CM

## 2019-08-15 PROCEDURE — 92226 PR SPECIAL EYE EXAM, SUBSEQUENT: ICD-10-PCS | Mod: LT,S$GLB,, | Performed by: OPHTHALMOLOGY

## 2019-08-15 PROCEDURE — 99499 UNLISTED E&M SERVICE: CPT | Mod: S$GLB,,, | Performed by: OPHTHALMOLOGY

## 2019-08-15 PROCEDURE — 99999 PR PBB SHADOW E&M-EST. PATIENT-LVL III: CPT | Mod: PBBFAC,,, | Performed by: OPHTHALMOLOGY

## 2019-08-15 PROCEDURE — 92014 PR EYE EXAM, EST PATIENT,COMPREHESV: ICD-10-PCS | Mod: S$GLB,,, | Performed by: OPHTHALMOLOGY

## 2019-08-15 PROCEDURE — 99999 PR PBB SHADOW E&M-EST. PATIENT-LVL III: ICD-10-PCS | Mod: PBBFAC,,, | Performed by: OPHTHALMOLOGY

## 2019-08-15 PROCEDURE — 99499 RISK ADDL DX/OHS AUDIT: ICD-10-PCS | Mod: S$GLB,,, | Performed by: OPHTHALMOLOGY

## 2019-08-15 PROCEDURE — 92014 COMPRE OPH EXAM EST PT 1/>: CPT | Mod: S$GLB,,, | Performed by: OPHTHALMOLOGY

## 2019-08-15 PROCEDURE — 92226 PR SPECIAL EYE EXAM, SUBSEQUENT: CPT | Mod: LT,S$GLB,, | Performed by: OPHTHALMOLOGY

## 2019-08-15 PROCEDURE — 92134 CPTRZ OPH DX IMG PST SGM RTA: CPT | Mod: S$GLB,,, | Performed by: OPHTHALMOLOGY

## 2019-08-15 PROCEDURE — 92134 POSTERIOR SEGMENT OCT RETINA (OCULAR COHERENCE TOMOGRAPHY)-BOTH EYES: ICD-10-PCS | Mod: S$GLB,,, | Performed by: OPHTHALMOLOGY

## 2019-08-15 NOTE — PROGRESS NOTES
HPI     DLS: 05/16/2019 Dr. Gibson    Patient states his vision has been stable since his last visit.     AT's PRN OU         OCT - OD ME  OS  ERM with some fibrosis, but flat around        A/P    1. RRD OS with multiple breaks    s/p 25g PPV/AFx/EL/C3F8 OS for RRD 6/20/28 8/13/18 - Recurrent inferior RRD with PVR OS    s/p /270 25g PPV/membrane stripping/EL/AFx/1000cs Katt oil OS for RRD with PVR 8/15/18    10/18 - inferior RD beneath oil   12/18 -stable    s/p 25g PPV/SO removal/inferior laser retinopexy (from midphery to ora)/AFx/C3F8 OS for RRD 3/20/19    Recurrent RD beneath Gas      S/p  25g PPV/inferior retinectomy/AFx/EL/5000cs OS  For REcurrent RRD with PVR 4/10/19    Doing well, iop ok  Some ERM proliferation      F/U 3 month OCT    2. PCIOL OU    3. PVD OD    4. DM  No DR  BS/BP/chol control

## 2019-08-23 RX ORDER — ATORVASTATIN CALCIUM 80 MG/1
TABLET, FILM COATED ORAL
Qty: 30 TABLET | Refills: 0 | Status: SHIPPED | OUTPATIENT
Start: 2019-08-23 | End: 2019-09-20 | Stop reason: SDUPTHER

## 2019-08-26 NOTE — TELEPHONE ENCOUNTER
Last visit 5/8/19 with Dr Mckoy   Next visit 11/11/19 With MASTER HOANG  Last refill 5/23/19 historical provider

## 2019-08-26 NOTE — PROGRESS NOTES
Refill Authorization Note     is requesting a refill authorization.    Brief assessment and rationale for refill: ROUTE: npp  Name and strength of medication: methocarbamol 500 mg        Medication Therapy Plan: LCO(6/4) patient reported taking methocarbamol, gabapentin, and Percocet 5-325 mg, which provided relief; Pain medication outside of protocol; non-participating provider; Route to you                   How patient will take medication: t2t po BID

## 2019-08-26 NOTE — TELEPHONE ENCOUNTER
Please see the following assessment. This refill request still requires some action on your part. Methocarbamol is outside of our protocol. Unclear who was last prescriber. Note from 6/19 referred to you as PCP. Routing to you. Thank you.

## 2019-08-28 RX ORDER — METHOCARBAMOL 500 MG/1
500 TABLET, FILM COATED ORAL 2 TIMES DAILY PRN
Qty: 180 TABLET | Refills: 0 | Status: SHIPPED | OUTPATIENT
Start: 2019-08-28

## 2019-08-28 NOTE — TELEPHONE ENCOUNTER
This message was routed to Dr. Andrew - he is not a PCP. This is former patient of Dr. Mckoy, will route to on-call.

## 2019-08-29 NOTE — TELEPHONE ENCOUNTER
Patient is establishing care with Dr. Andrew in February. Patient has visit in November with NATALYA Torres.

## 2019-09-02 RX ORDER — FENOFIBRATE 48 MG/1
48 TABLET, FILM COATED ORAL DAILY
Qty: 30 TABLET | Refills: 0 | Status: SHIPPED | OUTPATIENT
Start: 2019-09-02 | End: 2019-09-29 | Stop reason: SDUPTHER

## 2019-09-04 ENCOUNTER — TELEPHONE (OUTPATIENT)
Dept: FAMILY MEDICINE | Facility: CLINIC | Age: 69
End: 2019-09-04

## 2019-09-10 DIAGNOSIS — M54.41 ACUTE RIGHT-SIDED LOW BACK PAIN WITH RIGHT-SIDED SCIATICA: ICD-10-CM

## 2019-09-10 RX ORDER — GABAPENTIN 300 MG/1
300 CAPSULE ORAL DAILY
Qty: 30 CAPSULE | Refills: 0 | Status: SHIPPED | OUTPATIENT
Start: 2019-09-10 | End: 2019-10-22 | Stop reason: SDUPTHER

## 2019-09-20 RX ORDER — ATORVASTATIN CALCIUM 80 MG/1
TABLET, FILM COATED ORAL
Qty: 30 TABLET | Refills: 5 | Status: SHIPPED | OUTPATIENT
Start: 2019-09-20

## 2019-09-25 RX ORDER — ALLOPURINOL 300 MG/1
300 TABLET ORAL 2 TIMES DAILY
Qty: 180 TABLET | Refills: 0 | Status: SHIPPED | OUTPATIENT
Start: 2019-09-25 | End: 2019-12-15 | Stop reason: SDUPTHER

## 2019-09-30 RX ORDER — FENOFIBRATE 48 MG/1
48 TABLET, FILM COATED ORAL DAILY
Qty: 30 TABLET | Refills: 0 | Status: SHIPPED | OUTPATIENT
Start: 2019-09-30 | End: 2019-10-25 | Stop reason: SDUPTHER

## 2019-10-21 DIAGNOSIS — M54.41 ACUTE RIGHT-SIDED LOW BACK PAIN WITH RIGHT-SIDED SCIATICA: ICD-10-CM

## 2019-10-21 RX ORDER — GABAPENTIN 300 MG/1
300 CAPSULE ORAL DAILY
Qty: 30 CAPSULE | Refills: 0 | OUTPATIENT
Start: 2019-10-21 | End: 2020-10-20

## 2019-10-22 DIAGNOSIS — M54.41 ACUTE RIGHT-SIDED LOW BACK PAIN WITH RIGHT-SIDED SCIATICA: ICD-10-CM

## 2019-10-24 RX ORDER — LISINOPRIL 20 MG/1
20 TABLET ORAL DAILY
Qty: 90 TABLET | Refills: 1 | Status: SHIPPED | OUTPATIENT
Start: 2019-10-24 | End: 2020-03-20

## 2019-10-25 RX ORDER — GABAPENTIN 300 MG/1
300 CAPSULE ORAL DAILY
Qty: 30 CAPSULE | Refills: 0 | Status: SHIPPED | OUTPATIENT
Start: 2019-10-25 | End: 2019-11-20 | Stop reason: SDUPTHER

## 2019-10-27 RX ORDER — FENOFIBRATE 48 MG/1
48 TABLET, FILM COATED ORAL DAILY
Qty: 30 TABLET | Refills: 0 | Status: SHIPPED | OUTPATIENT
Start: 2019-10-27 | End: 2019-11-20 | Stop reason: SDUPTHER

## 2019-11-20 DIAGNOSIS — M54.41 ACUTE RIGHT-SIDED LOW BACK PAIN WITH RIGHT-SIDED SCIATICA: ICD-10-CM

## 2019-11-20 RX ORDER — GABAPENTIN 300 MG/1
300 CAPSULE ORAL DAILY
Qty: 90 CAPSULE | Refills: 0 | Status: SHIPPED | OUTPATIENT
Start: 2019-11-20 | End: 2020-11-19

## 2019-11-20 RX ORDER — FENOFIBRATE 48 MG/1
48 TABLET, FILM COATED ORAL DAILY
Qty: 30 TABLET | Refills: 0 | Status: SHIPPED | OUTPATIENT
Start: 2019-11-20 | End: 2019-12-15 | Stop reason: SDUPTHER

## 2019-12-16 RX ORDER — FENOFIBRATE 48 MG/1
48 TABLET, FILM COATED ORAL DAILY
Qty: 90 TABLET | Refills: 1 | Status: SHIPPED | OUTPATIENT
Start: 2019-12-16 | End: 2020-12-15

## 2019-12-17 RX ORDER — ALLOPURINOL 300 MG/1
300 TABLET ORAL 2 TIMES DAILY
Qty: 180 TABLET | Refills: 0 | Status: SHIPPED | OUTPATIENT
Start: 2019-12-17 | End: 2020-03-16

## 2020-01-16 ENCOUNTER — OFFICE VISIT (OUTPATIENT)
Dept: GASTROENTEROLOGY | Facility: CLINIC | Age: 70
End: 2020-01-16
Payer: COMMERCIAL

## 2020-01-16 VITALS
HEART RATE: 66 BPM | DIASTOLIC BLOOD PRESSURE: 68 MMHG | SYSTOLIC BLOOD PRESSURE: 120 MMHG | HEIGHT: 69 IN | WEIGHT: 289.69 LBS | BODY MASS INDEX: 42.91 KG/M2

## 2020-01-16 DIAGNOSIS — K76.0 FATTY LIVER: ICD-10-CM

## 2020-01-16 DIAGNOSIS — Z86.010 HISTORY OF COLON POLYPS: ICD-10-CM

## 2020-01-16 DIAGNOSIS — K21.00 GERD WITH ESOPHAGITIS: ICD-10-CM

## 2020-01-16 DIAGNOSIS — K92.1 HEMATOCHEZIA: Primary | ICD-10-CM

## 2020-01-16 DIAGNOSIS — D64.9 NORMOCYTIC ANEMIA: ICD-10-CM

## 2020-01-16 DIAGNOSIS — R19.5 FECAL OCCULT BLOOD TEST POSITIVE: ICD-10-CM

## 2020-01-16 PROCEDURE — 99204 PR OFFICE/OUTPT VISIT, NEW, LEVL IV, 45-59 MIN: ICD-10-PCS | Mod: S$GLB,,, | Performed by: NURSE PRACTITIONER

## 2020-01-16 PROCEDURE — 99999 PR PBB SHADOW E&M-EST. PATIENT-LVL IV: CPT | Mod: PBBFAC,,, | Performed by: NURSE PRACTITIONER

## 2020-01-16 PROCEDURE — 99204 OFFICE O/P NEW MOD 45 MIN: CPT | Mod: S$GLB,,, | Performed by: NURSE PRACTITIONER

## 2020-01-16 PROCEDURE — 99999 PR PBB SHADOW E&M-EST. PATIENT-LVL IV: ICD-10-PCS | Mod: PBBFAC,,, | Performed by: NURSE PRACTITIONER

## 2020-01-16 NOTE — PROGRESS NOTES
Subjective:       Patient ID: Myron Noel is a 69 y.o. male, Body mass index is 42.78 kg/m².    Chief Complaint: Rectal Bleeding (GI follow up, history of colon polyps)      Patient is new to me. Established patient of Dr. Pham.  [facility] and history of colon polyps.    Here with daughter, whom assisted with interview.    Rectal Bleeding   This is a new problem. The current episode started more than 1 month ago (Started 10/2019). The problem occurs intermittently (has occurred three times since onset). The problem has been unchanged (sees small amount of bright red blood on tissue paper and in toilet bowl after bowel movements; denies bleeding in between movements; denies blood clots). Pertinent negatives include no abdominal pain, anorexia, change in bowel habit, chest pain, coughing, fever, nausea, rash, vomiting or weakness. Exacerbated by: occ has to strain to have a bowel movement. He has tried nothing for the symptoms.     Review of Systems   Constitutional: Negative for appetite change, fever and unexpected weight change.   HENT: Negative for trouble swallowing.    Respiratory: Negative for cough and shortness of breath.    Cardiovascular: Negative for chest pain.   Gastrointestinal: Positive for hematochezia. Negative for abdominal pain, anorexia, blood in stool, change in bowel habit, constipation, diarrhea, nausea and vomiting.        Hx of GERD- well controlled on Protonix 40 mg once daily   Genitourinary: Negative for difficulty urinating and dysuria.   Musculoskeletal: Positive for gait problem.   Skin: Negative for rash.   Neurological: Negative for speech difficulty and weakness.   Psychiatric/Behavioral: Negative for confusion.       Past Medical History:   Diagnosis Date    Cataract     ou done//    Chronic kidney disease 2001    nephrectomy for obstructive stones    CKD (chronic kidney disease) stage 3, GFR 30-59 ml/min 5/12/2017    Dr. De Anda    Colon polyp      Corneal abrasion, left     With leaf 40 yrs ago    Diabetes mellitus     GERD (gastroesophageal reflux disease)     Gout, chronic     Hypertension     Knee osteoarthritis 12/20/2013    Retinal detachment of left eye with multiple breaks 6/18/2018      Past Surgical History:   Procedure Laterality Date    APPENDECTOMY      CATARACT EXTRACTION  08/06/2014    od    CATARACT EXTRACTION W/  INTRAOCULAR LENS IMPLANT Left 11/5/14    COLONOSCOPY      EYE SURGERY      NEPHRECTOMY      REPAIR OF RETINAL DETACHMENT WITH VITRECTOMY Left 6/20/2018    Procedure: REPAIR, RETINAL DETACHMENT, WITH VITRECTOMY;  Surgeon: IRA Gibson MD;  Location: [facility] OR [address] [place] [address];  Service: Ophthalmology;  Laterality: Left;  45 min    REPAIR OF RETINAL DETACHMENT WITH VITRECTOMY Left 3/20/2019    Procedure: REPAIR, RETINAL DETACHMENT, WITH VITRECTOMY;  Surgeon: IRA Gibson MD;  Location: [facility] OR [address] [place] [address];  Service: Ophthalmology;  Laterality: Left;  with endolaser    REPAIR OF RETINAL DETACHMENT WITH VITRECTOMY Left 4/10/2019    Procedure: REPAIR, RETINAL DETACHMENT, WITH VITRECTOMY;  Surgeon: IRA Gibson MD;  Location: [facility] OR [address] [place] [address];  Service: Ophthalmology;  Laterality: Left;  45 min    SCLERAL BUCKLING Left 8/15/2018    Procedure: SCLERAL BUCKLING;  Surgeon: IRA Gibson MD;  Location: [facility] OR [address] [place] [address];  Service: Ophthalmology;  Laterality: Left;  240/270 SB  Vit.  2hrs    TONSILLECTOMY      TRANSFORAMINAL EPIDURAL INJECTION OF STEROID Right 5/7/2019    Procedure: Injection,steroid,epidural,transforaminal approach L3-4, L4-5;  Surgeon: Prince Clemente MD;  Location: [facility] OR;  Service: Pain Management;  Laterality: Right;    UPPER GASTROINTESTINAL ENDOSCOPY  06/23/2015    Dr. Pham; reflux esophagitis; small hiatal hernia; gastritis      Family History   Problem Relation Age of Onset    Heart defect Father     Alzheimer's disease Maternal Aunt     Diabetes Maternal Grandmother     Melanoma  Neg Hx     Psoriasis Neg Hx     Lupus Neg Hx     Eczema Neg Hx     Colon cancer Neg Hx       Wt Readings from Last 10 Encounters:   01/16/20 131.4 kg (289 lb 11 oz)   06/04/19 124.3 kg (274 lb)   05/24/19 124.3 kg (274 lb)   05/19/19 124.3 kg (274 lb)   05/08/19 125.5 kg (276 lb 10.8 oz)   05/06/19 124.3 kg (274 lb)   04/22/19 124.3 kg (274 lb)   04/10/19 124.3 kg (274 lb)   04/04/19 124.7 kg (274 lb 14.6 oz)   04/01/19 128.8 kg (284 lb)     Lab Results   Component Value Date    WBC 10.19 07/09/2018    HGB 13.4 (L) 07/09/2018    HCT 43.1 07/09/2018    MCV 97 07/09/2018     07/09/2018     CMP  Sodium   Date Value Ref Range Status   05/15/2019 142 136 - 145 mmol/L Final   05/15/2019 144 136 - 145 mmol/L Final     Potassium   Date Value Ref Range Status   05/15/2019 4.5 3.5 - 5.1 mmol/L Final   05/15/2019 4.5 3.5 - 5.1 mmol/L Final     Chloride   Date Value Ref Range Status   05/15/2019 107 95 - 110 mmol/L Final   05/15/2019 108 95 - 110 mmol/L Final     CO2   Date Value Ref Range Status   05/15/2019 23 23 - 29 mmol/L Final   05/15/2019 24 23 - 29 mmol/L Final     Glucose   Date Value Ref Range Status   05/15/2019 136 (H) 70 - 110 mg/dL Final   05/15/2019 136 (H) 70 - 110 mg/dL Final     BUN, Bld   Date Value Ref Range Status   05/15/2019 25 (H) 8 - 23 mg/dL Final   05/15/2019 25 (H) 8 - 23 mg/dL Final     Creatinine   Date Value Ref Range Status   05/15/2019 1.0 0.5 - 1.4 mg/dL Final   05/15/2019 1.0 0.5 - 1.4 mg/dL Final     Calcium   Date Value Ref Range Status   05/15/2019 10.0 8.7 - 10.5 mg/dL Final   05/15/2019 9.9 8.7 - 10.5 mg/dL Final     Total Protein   Date Value Ref Range Status   05/15/2019 7.2 6.0 - 8.4 g/dL Final     Albumin   Date Value Ref Range Status   05/15/2019 3.9 3.5 - 5.2 g/dL Final   05/15/2019 3.9 3.5 - 5.2 g/dL Final     Total Bilirubin   Date Value Ref Range Status   05/15/2019 0.5 0.1 - 1.0 mg/dL Final     Comment:     For infants and newborns, interpretation of results should be  based  on gestational age, weight and in agreement with clinical  observations.  Premature Infant recommended reference ranges:  Up to 24 hours.............<8.0 mg/dL  Up to 48 hours............<12.0 mg/dL  3-5 days..................<15.0 mg/dL  6-29 days.................<15.0 mg/dL       Alkaline Phosphatase   Date Value Ref Range Status   05/15/2019 113 55 - 135 U/L Final     AST   Date Value Ref Range Status   05/15/2019 16 10 - 40 U/L Final     ALT   Date Value Ref Range Status   05/15/2019 14 10 - 44 U/L Final     Anion Gap   Date Value Ref Range Status   05/15/2019 12 8 - 16 mmol/L Final   05/15/2019 12 8 - 16 mmol/L Final     eGFR if    Date Value Ref Range Status   05/15/2019 >60.0 >60 mL/min/1.73 m^2 Final   05/15/2019 >60.0 >60 mL/min/1.73 m^2 Final     eGFR if non    Date Value Ref Range Status   05/15/2019 >60.0 >60 mL/min/1.73 m^2 Final     Comment:     Calculation used to obtain the estimated glomerular filtration  rate (eGFR) is the CKD-EPI equation.      05/15/2019 >60.0 >60 mL/min/1.73 m^2 Final     Comment:     Calculation used to obtain the estimated glomerular filtration  rate (eGFR) is the CKD-EPI equation.                 Reviewed prior medical records including radiology report of US of abdomen 5/15/19 and CBC 8/12/19 (13.4/40.3 in media tab) & endoscopy history (see surgical history).     Objective:      Physical Exam   Constitutional: He is oriented to person, place, and time. He appears well-developed and well-nourished.   HENT:   Head: Normocephalic.   Eyes: Pupils are equal, round, and reactive to light.   Neck: Normal range of motion.   Cardiovascular: Normal rate, regular rhythm and normal heart sounds.   No murmur heard.  Pulmonary/Chest: Breath sounds normal. No respiratory distress. He has no wheezes.   Abdominal: Soft. Bowel sounds are normal. He exhibits no distension and no mass. There is no tenderness. There is no guarding.   Obese abdomen    Musculoskeletal: Normal range of motion.   Ambulates with cane   Neurological: He is alert and oriented to person, place, and time.   Skin: Skin is warm and dry. No rash noted.   Non jaundiced   Psychiatric: He has a normal mood and affect. His speech is normal.         Assessment:       1. Hematochezia    2. Fecal occult blood test positive    3. History of colon polyps    4. GERD with esophagitis    5. Fatty liver    6. Normocytic anemia           Plan:   All diagnoses and orders for this visit:    Hematochezia & Fecal occult blood test positive  - Discussed about different etiologies that can cause rectal bleeding, such as but not limited to diverticulosis, polyps, colon mass, colon inflammation or infection, anal fissure or hemorrhoids.   - Schedule Colonoscopy, discussed procedure with the patient, verbalized understanding         - Avoid/minimize aspirin and anti-inflammatory drugs such as ibuprofen (Advil, Motrin) and naproxen (Aleve and Naprosyn).        - Recommend Colace daily    History of colon polyps   - Schedule Colonoscopy, discussed procedure with the patient, including risks and benefits, patient verbalized understanding    GERD with esophagitis   - Continue Protonix 40 mg once daily   - Take PPI in the morning 30 minutes before breakfast   - Recommend to avoid large meals, avoid eating within 3 hours of bedtime, elevate head of bed if nocturnal symptoms are present, smoking cessation (if current smoker), & weight loss (if overweight).    - Recommend minimize/avoid high-fat foods, chocolate, caffeine, citrus, alcohol, & tomato products.   - Advised to avoid/limit use of NSAID's, since they can cause GI upset, bleeding, and/or ulcers. If needed, take with food.     Fatty liver   - For fatty liver recommend: low fat, low cholesterol diet, maintain good control of blood sugars and cholesterol levels, exercise, weight loss (if overweight), minimize/avoid alcohol and tylenol products, & follow-up with  PCP for continued evaluation and management; if specialist is needed, recommend seeing hepatology.    Normocytic anemia   - Follow-up with PCP and/or hematology for continued evaluation and management    If no improvement in symptoms or symptoms worsen, call/follow-up at clinic or go to ER

## 2020-01-16 NOTE — LETTER
January 16, 2020      Britta Baez PA-C  [address] [address]           [facility] - Gastroenterology  [address]  [place] [facility] [place] [address]  Phone: [phone]          Patient: Myron Noel   MR Number: [id number]   YOB: 1950   Date of Visit: 1/16/2020       Dear Britta Baez:    Thank you for referring Myron Noel to me for evaluation. Attached you will find relevant portions of my assessment and plan of care.    If you have questions, please do not hesitate to call me. I look forward to following Myron Noel along with you.    Sincerely,    Melanie Abrams, NP    Enclosure  CC:  No Recipients    If you would like to receive this communication electronically, please contact [email or web address] or [phone] to request more information on [employer] access.    For providers and/or their staff who would like to refer a patient to [facility], please contact us through our one-stop-shop provider referral line, Lionel Pan, at [phone].    If you feel you have received this communication in error or would no longer like to receive these types of communications, please e-mail [email or web address]

## 2020-01-16 NOTE — H&P (VIEW-ONLY)
Subjective:       Patient ID: Myron Noel is a 69 y.o. male, Body mass index is 42.78 kg/m².    Chief Complaint: Rectal Bleeding (GI follow up, history of colon polyps)      Patient is new to me. Established patient of Dr. Pham.  [facility] and history of colon polyps.    Here with daughter, whom assisted with interview.    Rectal Bleeding   This is a new problem. The current episode started more than 1 month ago (Started 10/2019). The problem occurs intermittently (has occurred three times since onset). The problem has been unchanged (sees small amount of bright red blood on tissue paper and in toilet bowl after bowel movements; denies bleeding in between movements; denies blood clots). Pertinent negatives include no abdominal pain, anorexia, change in bowel habit, chest pain, coughing, fever, nausea, rash, vomiting or weakness. Exacerbated by: occ has to strain to have a bowel movement. He has tried nothing for the symptoms.     Review of Systems   Constitutional: Negative for appetite change, fever and unexpected weight change.   HENT: Negative for trouble swallowing.    Respiratory: Negative for cough and shortness of breath.    Cardiovascular: Negative for chest pain.   Gastrointestinal: Positive for hematochezia. Negative for abdominal pain, anorexia, blood in stool, change in bowel habit, constipation, diarrhea, nausea and vomiting.        Hx of GERD- well controlled on Protonix 40 mg once daily   Genitourinary: Negative for difficulty urinating and dysuria.   Musculoskeletal: Positive for gait problem.   Skin: Negative for rash.   Neurological: Negative for speech difficulty and weakness.   Psychiatric/Behavioral: Negative for confusion.       Past Medical History:   Diagnosis Date    Cataract     ou done//    Chronic kidney disease 2001    nephrectomy for obstructive stones    CKD (chronic kidney disease) stage 3, GFR 30-59 ml/min 5/12/2017    Dr. De Anda    Colon polyp      Corneal abrasion, left     With leaf 40 yrs ago    Diabetes mellitus     GERD (gastroesophageal reflux disease)     Gout, chronic     Hypertension     Knee osteoarthritis 12/20/2013    Retinal detachment of left eye with multiple breaks 6/18/2018      Past Surgical History:   Procedure Laterality Date    APPENDECTOMY      CATARACT EXTRACTION  08/06/2014    od    CATARACT EXTRACTION W/  INTRAOCULAR LENS IMPLANT Left 11/5/14    COLONOSCOPY      EYE SURGERY      NEPHRECTOMY      REPAIR OF RETINAL DETACHMENT WITH VITRECTOMY Left 6/20/2018    Procedure: REPAIR, RETINAL DETACHMENT, WITH VITRECTOMY;  Surgeon: IRA Gibson MD;  Location: [facility] OR [address] [place] [address];  Service: Ophthalmology;  Laterality: Left;  45 min    REPAIR OF RETINAL DETACHMENT WITH VITRECTOMY Left 3/20/2019    Procedure: REPAIR, RETINAL DETACHMENT, WITH VITRECTOMY;  Surgeon: IRA Gibson MD;  Location: [facility] OR [address] [place] [address];  Service: Ophthalmology;  Laterality: Left;  with endolaser    REPAIR OF RETINAL DETACHMENT WITH VITRECTOMY Left 4/10/2019    Procedure: REPAIR, RETINAL DETACHMENT, WITH VITRECTOMY;  Surgeon: IRA Gibson MD;  Location: [facility] OR [address] [place] [address];  Service: Ophthalmology;  Laterality: Left;  45 min    SCLERAL BUCKLING Left 8/15/2018    Procedure: SCLERAL BUCKLING;  Surgeon: IRA Gibson MD;  Location: [facility] OR [address] [place] [address];  Service: Ophthalmology;  Laterality: Left;  240/270 SB  Vit.  2hrs    TONSILLECTOMY      TRANSFORAMINAL EPIDURAL INJECTION OF STEROID Right 5/7/2019    Procedure: Injection,steroid,epidural,transforaminal approach L3-4, L4-5;  Surgeon: Prince Clemente MD;  [facility] OR;  Service: Pain Management;  Laterality: Right;    UPPER GASTROINTESTINAL ENDOSCOPY  06/23/2015    Dr. Pham; reflux esophagitis; small hiatal hernia; gastritis      Family History   Problem Relation Age of Onset    Heart defect Father     Alzheimer's disease Maternal Aunt     Diabetes Maternal Grandmother     Melanoma  Neg Hx     Psoriasis Neg Hx     Lupus Neg Hx     Eczema Neg Hx     Colon cancer Neg Hx       Wt Readings from Last 10 Encounters:   01/16/20 131.4 kg (289 lb 11 oz)   06/04/19 124.3 kg (274 lb)   05/24/19 124.3 kg (274 lb)   05/19/19 124.3 kg (274 lb)   05/08/19 125.5 kg (276 lb 10.8 oz)   05/06/19 124.3 kg (274 lb)   04/22/19 124.3 kg (274 lb)   04/10/19 124.3 kg (274 lb)   04/04/19 124.7 kg (274 lb 14.6 oz)   04/01/19 128.8 kg (284 lb)     Lab Results   Component Value Date    WBC 10.19 07/09/2018    HGB 13.4 (L) 07/09/2018    HCT 43.1 07/09/2018    MCV 97 07/09/2018     07/09/2018     CMP  Sodium   Date Value Ref Range Status   05/15/2019 142 136 - 145 mmol/L Final   05/15/2019 144 136 - 145 mmol/L Final     Potassium   Date Value Ref Range Status   05/15/2019 4.5 3.5 - 5.1 mmol/L Final   05/15/2019 4.5 3.5 - 5.1 mmol/L Final     Chloride   Date Value Ref Range Status   05/15/2019 107 95 - 110 mmol/L Final   05/15/2019 108 95 - 110 mmol/L Final     CO2   Date Value Ref Range Status   05/15/2019 23 23 - 29 mmol/L Final   05/15/2019 24 23 - 29 mmol/L Final     Glucose   Date Value Ref Range Status   05/15/2019 136 (H) 70 - 110 mg/dL Final   05/15/2019 136 (H) 70 - 110 mg/dL Final     BUN, Bld   Date Value Ref Range Status   05/15/2019 25 (H) 8 - 23 mg/dL Final   05/15/2019 25 (H) 8 - 23 mg/dL Final     Creatinine   Date Value Ref Range Status   05/15/2019 1.0 0.5 - 1.4 mg/dL Final   05/15/2019 1.0 0.5 - 1.4 mg/dL Final     Calcium   Date Value Ref Range Status   05/15/2019 10.0 8.7 - 10.5 mg/dL Final   05/15/2019 9.9 8.7 - 10.5 mg/dL Final     Total Protein   Date Value Ref Range Status   05/15/2019 7.2 6.0 - 8.4 g/dL Final     Albumin   Date Value Ref Range Status   05/15/2019 3.9 3.5 - 5.2 g/dL Final   05/15/2019 3.9 3.5 - 5.2 g/dL Final     Total Bilirubin   Date Value Ref Range Status   05/15/2019 0.5 0.1 - 1.0 mg/dL Final     Comment:     For infants and newborns, interpretation of results should be  based  on gestational age, weight and in agreement with clinical  observations.  Premature Infant recommended reference ranges:  Up to 24 hours.............<8.0 mg/dL  Up to 48 hours............<12.0 mg/dL  3-5 days..................<15.0 mg/dL  6-29 days.................<15.0 mg/dL       Alkaline Phosphatase   Date Value Ref Range Status   05/15/2019 113 55 - 135 U/L Final     AST   Date Value Ref Range Status   05/15/2019 16 10 - 40 U/L Final     ALT   Date Value Ref Range Status   05/15/2019 14 10 - 44 U/L Final     Anion Gap   Date Value Ref Range Status   05/15/2019 12 8 - 16 mmol/L Final   05/15/2019 12 8 - 16 mmol/L Final     eGFR if    Date Value Ref Range Status   05/15/2019 >60.0 >60 mL/min/1.73 m^2 Final   05/15/2019 >60.0 >60 mL/min/1.73 m^2 Final     eGFR if non    Date Value Ref Range Status   05/15/2019 >60.0 >60 mL/min/1.73 m^2 Final     Comment:     Calculation used to obtain the estimated glomerular filtration  rate (eGFR) is the CKD-EPI equation.      05/15/2019 >60.0 >60 mL/min/1.73 m^2 Final     Comment:     Calculation used to obtain the estimated glomerular filtration  rate (eGFR) is the CKD-EPI equation.                 Reviewed prior medical records including radiology report of US of abdomen 5/15/19 and CBC 8/12/19 (13.4/40.3 in media tab) & endoscopy history (see surgical history).     Objective:      Physical Exam   Constitutional: He is oriented to person, place, and time. He appears well-developed and well-nourished.   HENT:   Head: Normocephalic.   Eyes: Pupils are equal, round, and reactive to light.   Neck: Normal range of motion.   Cardiovascular: Normal rate, regular rhythm and normal heart sounds.   No murmur heard.  Pulmonary/Chest: Breath sounds normal. No respiratory distress. He has no wheezes.   Abdominal: Soft. Bowel sounds are normal. He exhibits no distension and no mass. There is no tenderness. There is no guarding.   Obese abdomen    Musculoskeletal: Normal range of motion.   Ambulates with cane   Neurological: He is alert and oriented to person, place, and time.   Skin: Skin is warm and dry. No rash noted.   Non jaundiced   Psychiatric: He has a normal mood and affect. His speech is normal.         Assessment:       1. Hematochezia    2. Fecal occult blood test positive    3. History of colon polyps    4. GERD with esophagitis    5. Fatty liver    6. Normocytic anemia           Plan:   All diagnoses and orders for this visit:    Hematochezia & Fecal occult blood test positive  - Discussed about different etiologies that can cause rectal bleeding, such as but not limited to diverticulosis, polyps, colon mass, colon inflammation or infection, anal fissure or hemorrhoids.   - Schedule Colonoscopy, discussed procedure with the patient, verbalized understanding         - Avoid/minimize aspirin and anti-inflammatory drugs such as ibuprofen (Advil, Motrin) and naproxen (Aleve and Naprosyn).        - Recommend Colace daily    History of colon polyps   - Schedule Colonoscopy, discussed procedure with the patient, including risks and benefits, patient verbalized understanding    GERD with esophagitis   - Continue Protonix 40 mg once daily   - Take PPI in the morning 30 minutes before breakfast   - Recommend to avoid large meals, avoid eating within 3 hours of bedtime, elevate head of bed if nocturnal symptoms are present, smoking cessation (if current smoker), & weight loss (if overweight).    - Recommend minimize/avoid high-fat foods, chocolate, caffeine, citrus, alcohol, & tomato products.   - Advised to avoid/limit use of NSAID's, since they can cause GI upset, bleeding, and/or ulcers. If needed, take with food.     Fatty liver   - For fatty liver recommend: low fat, low cholesterol diet, maintain good control of blood sugars and cholesterol levels, exercise, weight loss (if overweight), minimize/avoid alcohol and tylenol products, & follow-up with  PCP for continued evaluation and management; if specialist is needed, recommend seeing hepatology.    Normocytic anemia   - Follow-up with PCP and/or hematology for continued evaluation and management    If no improvement in symptoms or symptoms worsen, call/follow-up at clinic or go to ER

## 2020-01-29 ENCOUNTER — ANESTHESIA EVENT (OUTPATIENT)
Dept: ENDOSCOPY | Facility: HOSPITAL | Age: 70
End: 2020-01-29
Payer: COMMERCIAL

## 2020-01-29 ENCOUNTER — ANESTHESIA (OUTPATIENT)
Dept: ENDOSCOPY | Facility: HOSPITAL | Age: 70
End: 2020-01-29
Payer: COMMERCIAL

## 2020-01-29 ENCOUNTER — HOSPITAL ENCOUNTER (OUTPATIENT)
Facility: HOSPITAL | Age: 70
Discharge: HOME | End: 2020-01-29
Attending: INTERNAL MEDICINE | Admitting: INTERNAL MEDICINE
Payer: COMMERCIAL

## 2020-01-29 DIAGNOSIS — K62.5 RECTAL BLEEDING: ICD-10-CM

## 2020-01-29 DIAGNOSIS — K57.90 DIVERTICULOSIS: ICD-10-CM

## 2020-01-29 DIAGNOSIS — K63.5 POLYP OF COLON, UNSPECIFIED PART OF COLON, UNSPECIFIED TYPE: ICD-10-CM

## 2020-01-29 DIAGNOSIS — K64.8 INTERNAL HEMORRHOIDS: Primary | ICD-10-CM

## 2020-01-29 LAB — POCT GLUCOSE: 105 MG/DL (ref 70–110)

## 2020-01-29 PROCEDURE — 25000003 PHARM REV CODE 250: Performed by: NURSE ANESTHETIST, CERTIFIED REGISTERED

## 2020-01-29 PROCEDURE — 88305 TISSUE EXAM BY PATHOLOGIST: CPT | Mod: 26,,, | Performed by: PATHOLOGY

## 2020-01-29 PROCEDURE — 45380 COLONOSCOPY AND BIOPSY: CPT | Mod: 59,,, | Performed by: INTERNAL MEDICINE

## 2020-01-29 PROCEDURE — 88305 TISSUE EXAM BY PATHOLOGIST: ICD-10-PCS | Mod: 26,,, | Performed by: PATHOLOGY

## 2020-01-29 PROCEDURE — 63600175 PHARM REV CODE 636 W HCPCS: Performed by: INTERNAL MEDICINE

## 2020-01-29 PROCEDURE — 27201012 HC FORCEPS, HOT/COLD, DISP: Performed by: INTERNAL MEDICINE

## 2020-01-29 PROCEDURE — 45385 COLONOSCOPY W/LESION REMOVAL: CPT | Performed by: INTERNAL MEDICINE

## 2020-01-29 PROCEDURE — 37000009 HC ANESTHESIA EA ADD 15 MINS: Performed by: INTERNAL MEDICINE

## 2020-01-29 PROCEDURE — 25000003 PHARM REV CODE 250: Performed by: INTERNAL MEDICINE

## 2020-01-29 PROCEDURE — D9220A PRA ANESTHESIA: ICD-10-PCS | Mod: CRNA,,, | Performed by: NURSE ANESTHETIST, CERTIFIED REGISTERED

## 2020-01-29 PROCEDURE — D9220A PRA ANESTHESIA: ICD-10-PCS | Mod: ANES,,, | Performed by: ANESTHESIOLOGY

## 2020-01-29 PROCEDURE — 45380 COLONOSCOPY AND BIOPSY: CPT | Performed by: INTERNAL MEDICINE

## 2020-01-29 PROCEDURE — 37000008 HC ANESTHESIA 1ST 15 MINUTES: Performed by: INTERNAL MEDICINE

## 2020-01-29 PROCEDURE — 27201089 HC SNARE, DISP (ANY): Performed by: INTERNAL MEDICINE

## 2020-01-29 PROCEDURE — 45385 COLONOSCOPY W/LESION REMOVAL: CPT | Mod: ,,, | Performed by: INTERNAL MEDICINE

## 2020-01-29 PROCEDURE — D9220A PRA ANESTHESIA: Mod: ANES,,, | Performed by: ANESTHESIOLOGY

## 2020-01-29 PROCEDURE — 88305 TISSUE EXAM BY PATHOLOGIST: CPT | Mod: 59 | Performed by: PATHOLOGY

## 2020-01-29 PROCEDURE — 45385 PR COLONOSCOPY,REMV LESN,SNARE: ICD-10-PCS | Mod: ,,, | Performed by: INTERNAL MEDICINE

## 2020-01-29 PROCEDURE — D9220A PRA ANESTHESIA: Mod: CRNA,,, | Performed by: NURSE ANESTHETIST, CERTIFIED REGISTERED

## 2020-01-29 PROCEDURE — 63600175 PHARM REV CODE 636 W HCPCS: Performed by: NURSE ANESTHETIST, CERTIFIED REGISTERED

## 2020-01-29 PROCEDURE — 45380 PR COLONOSCOPY,BIOPSY: ICD-10-PCS | Mod: 59,,, | Performed by: INTERNAL MEDICINE

## 2020-01-29 RX ORDER — PROPOFOL 10 MG/ML
VIAL (ML) INTRAVENOUS
Status: DISCONTINUED | OUTPATIENT
Start: 2020-01-29 | End: 2020-01-29

## 2020-01-29 RX ORDER — SODIUM CHLORIDE 9 MG/ML
INJECTION, SOLUTION INTRAVENOUS CONTINUOUS
Status: DISCONTINUED | OUTPATIENT
Start: 2020-01-29 | End: 2020-01-29 | Stop reason: HOSPADM

## 2020-01-29 RX ORDER — DEXTROMETHORPHAN/PSEUDOEPHED 2.5-7.5/.8
DROPS ORAL
Status: COMPLETED | OUTPATIENT
Start: 2020-01-29 | End: 2020-01-29

## 2020-01-29 RX ADMIN — PROPOFOL 30 MG: 10 INJECTION, EMULSION INTRAVENOUS at 10:01

## 2020-01-29 RX ADMIN — PROPOFOL 140 MG: 10 INJECTION, EMULSION INTRAVENOUS at 10:01

## 2020-01-29 RX ADMIN — PROPOFOL 30 MG: 10 INJECTION, EMULSION INTRAVENOUS at 11:01

## 2020-01-29 RX ADMIN — SODIUM CHLORIDE: 0.9 INJECTION, SOLUTION INTRAVENOUS at 10:01

## 2020-01-29 RX ADMIN — SIMETHICONE 40 MG: 20 SUSPENSION/ DROPS ORAL at 10:01

## 2020-01-29 RX ADMIN — ESMOLOL HYDROCHLORIDE 10 MG: 20 INJECTION INTRAVENOUS at 10:01

## 2020-01-29 NOTE — PLAN OF CARE
Patient awake, alert, and oriented.  No complaints of pain or discomfort at present time.  Abdomen soft and nontender;  Ambulates with cane  di;  All instructions given and reviewed with patient and family;  Stable for discharge to home accompanied by daughter pt has all belongings

## 2020-01-29 NOTE — [employer] PATIENT INSTRUCTIONS
Discharge Summary/Instructions after an Endoscopic Procedure  Patient Name: Myron Neol  Patient MRN: [id number]  Patient YOB: 1950     Wednesday, January 29, 2020  Shakir Jaimes MD  RESTRICTIONS:  During your procedure today, you received medications for sedation.  These   medications may affect your judgment, balance and coordination.  Therefore,   for 24 hours, you have the following restrictions:   - DO NOT drive a car, operate machinery, make legal/financial decisions,   sign important papers or drink alcohol.    ACTIVITY:  Today: no heavy lifting, straining or running due to procedural   sedation/anesthesia.  The following day: return to full activity including work.  DIET:  Eat and drink normally unless instructed otherwise.     TREATMENT FOR COMMON SIDE EFFECTS:  - Mild abdominal pain, nausea, belching, bloating or excessive gas:  rest,   eat lightly and use a heating pad.  - Sore Throat: treat with throat lozenges and/or gargle with warm salt   water.  - Because air was used during the procedure, expelling large amounts of air   from your rectum or belching is normal.  - If a bowel prep was taken, you may not have a bowel movement for 1-3 days.    This is normal.  SYMPTOMS TO WATCH FOR AND REPORT TO YOUR PHYSICIAN:  1. Abdominal pain or bloating, other than gas cramps.  2. Chest pain.  3. Back pain.  4. Signs of infection such as: chills or fever occurring within 24 hours   after the procedure.  5. Rectal bleeding, which would show as bright red, maroon, or black stools.   (A tablespoon of blood from the rectum is not serious, especially if   hemorrhoids are present.)  6. Vomiting.  7. Weakness or dizziness.  GO DIRECTLY TO THE NEAREST EMERGENCY ROOM IF YOU HAVE ANY OF THE FOLLOWING:      Difficulty breathing              Chills and/or fever over 101 F   Persistent vomiting and/or vomiting blood   Severe abdominal pain   Severe chest pain   Black, tarry stools   Bleeding- more than one  tablespoon   Any other symptom or condition that you feel may need urgent attention  Your doctor recommends these additional instructions:  If any biopsies were taken, your doctors clinic will contact you in 1 to 2   weeks with any results.  - Patient has a contact number available for emergencies.  The signs and   symptoms of potential delayed complications were discussed with the   patient.  Return to normal activities tomorrow.  Written discharge   instructions were provided to the patient.   - High fiber diet.   - Continue present medications.   - Await pathology results.   - Repeat colonoscopy in 3 years for surveillance.   - Discharge patient to home (ambulatory).   - Return to my office PRN.  For questions, problems or results please call your physician - Shakir Jaimes MD at Work:  [phone].  [facility], EMERGENCY ROOM PHONE NUMBER: [phone]  IF A COMPLICATION OR EMERGENCY SITUATION ARISES AND YOU ARE UNABLE TO REACH   YOUR PHYSICIAN - GO DIRECTLY TO THE EMERGENCY ROOM.  Shakir Jaimes MD  1/29/2020 11:32:42 AM  This report has been verified and signed electronically.  [employer]

## 2020-01-29 NOTE — DISCHARGE INSTRUCTIONS
General Information:    1.  Do not drink alcoholic beverages including beer for 24 hours or as long as you are on pain medication..  2.  Do not drive a motor vehicle, operate machinery or power tools, or signs legal papers for 24 hours or as long as you are on pain medication.   3.  You may experience light-headedness, dizziness, and sleepiness following surgery. Please do not stay alone. A responsible adult should be with you for this 24 hour period.  4.  Go home and rest.    5. Progress slowly to a normal diet unless instructed.  Otherwise, begin with liquids such as soft drinks, then soup and crackers working up to solid foods. Drink plenty of nonalcoholic fluids.  6.  Certain anesthetics and pain medications produce nausea and vomiting in certain       individuals. If nausea becomes a problem at home, call you doctor.    7. A nurse will be calling you sometime after surgery. Do not be alarmed. This is our way of finding out how you are doing.    8. Several times every hour while you are awake, take 2-3 deep breaths and cough. If you had stomach surgery hold a pillow or rolled towel firmly against your stomach before you cough. This will help with any pain the cough might cause.  9. Several times every hour while you are awake, pump and flex your feet 5-6 times and do foot circles. This will help prevent blood clots.    10.Call your doctor for severe pain, bleeding, fever, or signs or symptoms of infection (pain, swelling, redness, foul odor, drainage).      Eating a High-Fiber Diet  Fiber is what gives strength and structure to plants. Most grains, beans, vegetables, and fruits contain fiber. Foods rich in fiber are often low in calories and fat, and they fill you up more. They may also reduce your risks for certain health problems. To find out the amount of fiber in canned, packaged, or frozen foods, read the Nutrition Facts label. It tells you how much fiber is in a serving.    Types of fiber and their  benefits  There are two types of fiber: insoluble and soluble. They both aid digestion and help you maintain a healthy weight.  · Insoluble fiber. This is found in whole grains, cereals, certain fruits and vegetables such as apple skin, corn, and carrots. Insoluble fiber may prevent constipation and reduce the risk for certain types of cancer.  · Soluble fiber. This type of fiber is in oats, beans, and certain fruits and vegetables such as strawberries and peas. Soluble fiber can reduce cholesterol, which may help lower the risk for heart disease. It also helps control blood sugar levels.  Look for high-fiber foods  Try these foods to add fiber to your diet:  · Whole-grain breads and cereals. Try to eat 6 to 8 ounces a day. Include wheat and oat bran cereals, whole-wheat muffins or toast, and corn tortillas in your meals.  · Fruits. Try to eat 2 cups a day. Apples, oranges, strawberries, pears, and bananas are good sources. (Note: Fruit juice is low in fiber.)  · Vegetables. Try to eat at least 2.5 cups a day. Add asparagus, carrots, broccoli, peas, and corn to your meals.  · Beans. One cup of cooked lentils gives you over 15 grams of fiber. Try navy beans, lentils, and chickpeas.  · Seeds. A small handful of seeds gives you about 3 grams of fiber. Try sunflower seeds.  Keep track of your fiber  Keep track of how much fiber you eat. Start by reading food labels. Then eat a variety of foods high in fiber. As you begin to eat more fiber, ask your healthcare provider how much water you should be drinking to keep your digestive system working smoothly.  You should aim for a certain amount of fiber in your diet each day. If you are a woman, that amount is between 25 and 28 grams per day. Men should aim for 30 to 33 grams per day. After age 50, your daily fiber needs drop to 22 grams for women and 28 grams for men.  Before you reach for the fiber supplements, think about this. Fiber is found naturally in healthy whole  foods. It gives you that feeling of fullness after you eat. Taking fiber supplements or eating fiber-enriched foods will not give you this full feeling.  Your fiber intake is a good measure for the quality of your overall diet. If you are missing out on your daily amount of fiber, you may be lacking other important nutrients as well.  Date Last Reviewed: 5/11/2015  © 4302-1353 [employer]. [address] [place] [address], [place] [address]. All rights reserved. This information is not intended as a substitute for professional medical care. Always follow your healthcare professional's instructions.        Understanding Colon and Rectal Polyps    The colon (also called the large intestine) is a muscular tube that forms the last part of the digestive tract. It absorbs water and stores food waste. The colon is about 4 to 6 feet long. The rectum is the last 6 inches of the colon. The colon and rectum have a smooth lining composed of millions of cells. Changes in these cells can lead to growths in the colon that can become cancerous and should be removed. Multiple tests are available to screen for colon cancer, but the colonoscopy is the most recommended test. During colonoscopy, these polyps can be removed. How often you need this test depends on many things including your condition, your family history, symptoms, and what the findings were at the previous colonoscopy.   When the colon lining changes  Changes that happen in the cells that line the colon or rectum can lead to growths called polyps. Over a period of years, polyps can turn cancerous. Removing polyps early may prevent cancer from ever forming.  Polyps  Polyps are fleshy clumps of tissue that form on the lining of the colon or rectum. Small polyps are usually benign (not cancerous). However, over time, cells in a polyp can change and become cancerous. Certain types of polyps known as adenomatous polyps are premalignant. The risk for invasive cancer  increases with the size of the polyp and certain cell and gene features. This means that they can become cancerous if they're not removed. Hyperplastic polyps are benign. They can grow quite large and not turn cancerous.   Cancer  Almost all colorectal cancers start when polyp cells begin growing abnormally. As a cancerous tumor grows, it may involve more and more of the colon or rectum. In time, cancer can also grow beyond the colon or rectum and spread to nearby organs or to glands called lymph nodes. The cells can also travel to other parts of the body. This is known as metastasis. The earlier a cancerous tumor is removed, the better the chance of preventing its spread.    Date Last Reviewed: 8/1/2016  © 0290-4694 [employer]. [address] [place] [address], [place] [address]. All rights reserved. This information is not intended as a substitute for professional medical care. Always follow your healthcare professional's instructions.        Hemorrhoids    Hemorrhoids are swollen and inflamed veins inside the rectum and near the anus. The rectum is the last several inches of the colon. The anus is the passage between the rectum and the outside of the body.  Causes  The veins can become swollen due to increased pressure in them. This is most often caused by:  · Chronic constipation or diarrhea  · Straining when having a bowel movement  · Sitting too long on the toilet  · A low-fiber diet  · Pregnancy  Symptoms  · Bleeding from the rectum (this may be noticeable after bowel movements)  · Lump near the anus  · Itching around the anus  · Pain around the anus  There are different types of hemorrhoids. Depending on the type you have and the severity, you may be able to treat yourself at home. In some cases, a procedure may be the best treatment option. Your healthcare provider can tell you more about this, if needed.  Home care  General care  · To get relief from pain or itching, try:  ¨ Topical products. Your  healthcare provider may prescribe or recommend creams, ointments, or pads that can be applied to the hemorrhoid. Use these exactly as directed.  ¨ Medicines. Your healthcare provider may recommend stool softeners, suppositories, or laxatives to help manage constipation. Use these exactly as directed.  ¨ Sitz baths. A sitz bath involves sitting in a few inches of warm bath water. Be careful not to make the water so hot that you burn yourself--test it before sitting in it. Soak for about 10 to 15 minutes a few times a day. This may help relieve pain.  Tips to help prevent hemorrhoids  · Eat more fiber. Fiber adds bulk to stool and absorbs water as it moves through your colon. This makes stool softer and easier to pass.  ¨ Increase the fiber in your diet with more fiber-rich foods. These include fresh fruit, vegetables, and whole grains.  ¨ Take a fiber supplement or bulking agent, if advised to by your provider. These include products such as psyllium or methylcellulose.  · Drink plenty of water, if directed to by your provider. This can help keep stool soft.  · Be more active. Frequent exercise aids digestion and helps prevent constipation. It may also help make bowel movements more regular.  · Dont strain during bowel movements. This can make hemorrhoids more likely. Also, dont sit on the toilet for long periods of time.  Follow-up care  Follow up with your healthcare provider, or as advised. If a culture or imaging tests were done, you will be notified of the results when they are ready. This may take a few days or longer.  When to seek medical advice  Call your healthcare provider right away if any of these occur:  · Increased bleeding from the rectum  · Increased pain around the rectum or anus  · Weakness or dizziness  Call 911  Call 911 or return to the emergency department right away if any of these occur:  · Trouble breathing or swallowing  · Fainting or loss of consciousness  · Unusually fast heart  rate  · Vomiting blood  · Large amounts of blood in stool  Date Last Reviewed: 6/22/2015  © 9169-5370 [employer]. [address] [place] [address], [place] [address]. All rights reserved. This information is not intended as a substitute for professional medical care. Always follow your healthcare professional's instructions.        Diverticulosis    Diverticulosis means that small pouches have formed in the wall of your large intestine (colon). Most often, this problem causes no symptoms and is common as people age. But the pouches in the colon are at risk of becoming infected. When this happens, the condition is called diverticulitis. Although most people with diverticulosis never develop diverticulitis, it is still not uncommon. Rectal bleeding can also occur and in less common situations, a type of colon inflammation called colitis.  While most people do not have symptoms, some people with diverticulosis may have:  · Abdominal cramps and pain  · Bloating  · Constipation  · Change in bowel habits  Causes  The exact cause of diverticulosis (and diverticulitis) has not been proved, but a few things are associated with the condition:  · Low-fiber diet  · Constipation  · Lack of exercise  Your healthcare provider will talk with you about how to manage your condition. Diet changes may be all that are needed to help control diverticulosis and prevent progression to diverticulitis. If you develop diverticulitis, you will likely need other treatments.  Home care  You may be told to take fiber supplements daily. Fiber adds bulk to the stool so that it passes through the colon more easily. Stool softeners may be recommended. You may also be given medications for pain relief. Be sure to take all medications as directed.  In the past, people were told to avoid corn, nuts, and seeds. This is no longer necessary.  Follow these guidelines when caring for yourself at home:  · Eat unprocessed foods that are high in fiber.  Whole grains, fruits, and vegetables are good choices.  · Drink 6 to 8 glasses of water every day unless your healthcare provider has you limit how much fluid you should have.  · Watch for changes in your bowel movements. Tell your provider if you notice any changes.  · Begin an exercise program. Ask your provider how to get started. Generally, walking is the best.  · Get plenty of rest and sleep.  Follow-up care  Follow up with your healthcare provider, or as advised. Regular visits may be needed to check on your health. Sometimes special procedures such as colonoscopy, are needed after an episode of diverticulitis or blooding. Be sure to keep all your appointments.  If a stool sample was taken, or cultures were done, you should be told if they are positive, or if your treatment needs to be changed. You can call as directed for the results.  If X-rays were done, a radiologist will look at them. You will be told if there is a change in your treatment.  If antibiotics were prescribed, be sure to finish them all.  When to seek medical advice  Call your healthcare provider right away if any of these occur:  · Fever of 100.4°F (38°C) or higher, or as directed by your healthcare provider  · Severe cramps in the lower left side of the abdomen or pain that is getting worse  · Tenderness in the lower left side of the abdomen or worsening pain throughout the abdomen  · Diarrhea or constipation that doesn't get better within 24 hours  · Nausea and vomiting  · Bleeding from the rectum  Call 911  Call emergency services if any of the following occur:  · Trouble breathing  · Confusion  · Very drowsy or trouble awakening  · Fainting or loss of consciousness  · Rapid heart rate  · Chest pain  Date Last Reviewed: 12/30/2015 © 2000-2017 [employer]. [address] [place] [address], [place] [address]. All rights reserved. This information is not intended as a substitute for professional medical care. Always follow your  "healthcare professional's instructions.      Discharge Instructions: After Your Surgery/Procedure  Youve just had surgery. During surgery you were given medicine called anesthesia to keep you relaxed and free of pain. After surgery you may have some pain or nausea. This is common. Here are some tips for feeling better and getting well after surgery.     Stay on schedule with your medication.   Going home  Your doctor or nurse will show you how to take care of yourself when you go home. He or she will also answer your questions. Have an adult family member or friend drive you home.      For your safety we recommend these precaution for the first 24 hours after your procedure:  · Do not drive or use heavy equipment.  · Do not make important decisions or sign legal papers.  · Do not drink alcohol.  · Have someone stay with you, if needed. He or she can watch for problems and help keep you safe.  · Your concentration, balance, coordination, and judgement may be impaired for many hours after anesthesia.  Use caution when ambulating or standing up.     · You may feel weak and "washed out" after anesthesia and surgery.      Subtle residual effects of general anesthesia or sedation with regional / local anesthesia can last more than 24 hours.  Rest for the remainder of the day or longer if your Doctor/Surgeon has advised you to do so.  Although you may feel normal within the first 24 hours, your reflexes and mental ability may be impaired without you realizing it.  You may feel dizzy, lightheaded or sleepy for 24 hours or longer.      Be sure to go to all follow-up visits with your doctor. And rest after your surgery for as long as your doctor tells you to.  Coping with pain  If you have pain after surgery, pain medicine will help you feel better. Take it as told, before pain becomes severe. Also, ask your doctor or pharmacist about other ways to control pain. This might be with heat, ice, or relaxation. And follow any " other instructions your surgeon or nurse gives you.  Tips for taking pain medicine  To get the best relief possible, remember these points:  · Pain medicines can upset your stomach. Taking them with a little food may help.  · Most pain relievers taken by mouth need at least 20 to 30 minutes to start to work.  · Taking medicine on a schedule can help you remember to take it. Try to time your medicine so that you can take it before starting an activity. This might be before you get dressed, go for a walk, or sit down for dinner.  · Constipation is a common side effect of pain medicines. Call your doctor before taking any medicines such as laxatives or stool softeners to help ease constipation. Also ask if you should skip any foods. Drinking lots of fluids and eating foods such as fruits and vegetables that are high in fiber can also help. Remember, do not take laxatives unless your surgeon has prescribed them.  · Drinking alcohol and taking pain medicine can cause dizziness and slow your breathing. It can even be deadly. Do not drink alcohol while taking pain medicine.  · Pain medicine can make you react more slowly to things. Do not drive or run machinery while taking pain medicine.  Your health care provider may tell you to take acetaminophen to help ease your pain. Ask him or her how much you are supposed to take each day. Acetaminophen or other pain relievers may interact with your prescription medicines or other over-the-counter (OTC) drugs. Some prescription medicines have acetaminophen and other ingredients. Using both prescription and OTC acetaminophen for pain can cause you to overdose. Read the labels on your OTC medicines with care. This will help you to clearly know the list of ingredients, how much to take, and any warnings. It may also help you not take too much acetaminophen. If you have questions or do not understand the information, ask your pharmacist or health care provider to explain it to you  before you take the OTC medicine.  Managing nausea  Some people have an upset stomach after surgery. This is often because of anesthesia, pain, or pain medicine, or the stress of surgery. These tips will help you handle nausea and eat healthy foods as you get better. If you were on a special food plan before surgery, ask your doctor if you should follow it while you get better. These tips may help:  · Do not push yourself to eat. Your body will tell you when to eat and how much.  · Start off with clear liquids and soup. They are easier to digest.  · Next try semi-solid foods, such as mashed potatoes, applesauce, and gelatin, as you feel ready.  · Slowly move to solid foods. Dont eat fatty, rich, or spicy foods at first.  · Do not force yourself to have 3 large meals a day. Instead eat smaller amounts more often.  · Take pain medicines with a small amount of solid food, such as crackers or toast, to avoid nausea.     Call your surgeon if  · You still have pain an hour after taking medicine. The medicine may not be strong enough.  · You feel too sleepy, dizzy, or groggy. The medicine may be too strong.  · You have side effects like nausea, vomiting, or skin changes, such as rash, itching, or hives.       If you have obstructive sleep apnea  You were given anesthesia medicine during surgery to keep you comfortable and free of pain. After surgery, you may have more apnea spells because of this medicine and other medicines you were given. The spells may last longer than usual.   At home:  · Keep using the continuous positive airway pressure (CPAP) device when you sleep. Unless your health care provider tells you not to, use it when you sleep, day or night. CPAP is a common device used to treat obstructive sleep apnea.  · Talk with your provider before taking any pain medicine, muscle relaxants, or sedatives. Your provider will tell you about the possible dangers of taking these medicines.  © 8422-2657 [employer]. [address] [place] [address], [place] [address]. All rights reserved. This information is not intended as a substitute for professional medical care. Always follow your healthcare professional's instructions.

## 2020-01-29 NOTE — ANESTHESIA PREPROCEDURE EVALUATION
01/29/2020  Myron Noel is a 69 y.o., male.    Anesthesia Evaluation    I have reviewed the Patient Summary Reports.    I have reviewed the Nursing Notes.   I have reviewed the Medications.     Review of Systems  Cardiovascular:   Hypertension    Pulmonary:   Sleep Apnea Not using CPAP   Renal/:   Chronic Renal Disease, CRI renal calculi    Hepatic/GI:   GERD Liver Disease, Fatty liver   Musculoskeletal:   Arthritis     Neurological:   Neuromuscular Disease,    Endocrine:   Diabetes, well controlled    Psych:  Psychiatric Normal           Physical Exam  General:  Morbid Obesity    Airway/Jaw/Neck:  Airway Findings: Mouth Opening: Normal Tongue: Normal  General Airway Assessment: Adult  Mallampati: III  Improves to II with phonation.      Dental:  Dental Findings: Periodontal disease, SevereMultiple missing and bad teeth   Chest/Lungs:  Chest/Lungs Findings: Clear to auscultation, Normal Respiratory Rate         Mental Status:  Mental Status Findings:  Cooperative, Alert and Oriented         Anesthesia Plan  Type of Anesthesia, risks & benefits discussed:  Anesthesia Type:  general  Patient's Preference:   Intra-op Monitoring Plan: standard ASA monitors  Intra-op Monitoring Plan Comments:   Post Op Pain Control Plan:   Post Op Pain Control Plan Comments:   Induction:   IV  Beta Blocker:  Patient is not currently on a Beta-Blocker (No further documentation required).       Informed Consent: Patient understands risks and agrees with Anesthesia plan.  Questions answered. Anesthesia consent signed with patient.  ASA Score: 3     Day of Surgery Review of History & Physical:            Ready For Surgery From Anesthesia Perspective.

## 2020-01-29 NOTE — ANESTHESIA POSTPROCEDURE EVALUATION
Anesthesia Post Evaluation    Patient: Myron Noel    Procedure(s) Performed: Procedure(s) (LRB):  COLONOSCOPY (N/A)    Final Anesthesia Type: general    Patient location during evaluation: PACU  Patient participation: Yes- Able to Participate  Level of consciousness: sedated and awake  Post-procedure vital signs: reviewed and stable  Pain management: adequate  Airway patency: patent    PONV status at discharge: No PONV  Anesthetic complications: no      Cardiovascular status: hypertensive and blood pressure returned to baseline  Respiratory status: spontaneous ventilation  Hydration status: euvolemic  Follow-up not needed.          Vitals Value Taken Time   /74 1/29/2020 11:50 AM   Temp 36.8 °C (98.2 °F) 1/29/2020 11:45 AM   Pulse 62 1/29/2020 11:50 AM   Resp 18 1/29/2020 11:50 AM   SpO2 95 % 1/29/2020 11:50 AM         No case tracking events are documented in the log.      Pain/Yousif Score: Yousif Score: 10 (1/29/2020 11:57 AM)

## 2020-01-29 NOTE — H&P
CC: Hematochezia    69 year old male with above. States that symptoms are resolved, no alleviating/exacerbating factors. No family history of CA. Positive personal history of polyps. No current bleeding or weight loss.     ROS:  No headache, no fever/chills, no chest pain/SOB, no nausea/vomiting/diarrhea/constipation/GI bleeding/abdominal pain, no dysuria/hematuria.    VSSAF   Exam:   Alert and oriented x 3; no apparent distress   PERRLA, sclera anicteric  CV: Regular rate/rhythm, normal PMI   Lungs: Clear bilaterally with no wheeze/rales   Abdomen: Soft, NT/ND, normal bowel sounds   Ext: No cyanosis, clubbing     Impression:   As above    Plan:   Proceed with endoscopy. Further recs to follow.

## 2020-01-29 NOTE — TRANSFER OF CARE
"Anesthesia Transfer of Care Note    Patient: Myron Noel    Procedure(s) Performed: Procedure(s) (LRB):  COLONOSCOPY (N/A)    Patient location: GI    Anesthesia Type: general    Transport from OR: Transported from OR on 2-3 L/min O2 by NC with adequate spontaneous ventilation    Post pain: adequate analgesia    Post assessment: no apparent anesthetic complications    Post vital signs: stable    Level of consciousness: awake    Nausea/Vomiting: no nausea/vomiting    Complications: none    Transfer of care protocol was followed      Last vitals:   Visit Vitals  BP (!) 147/70 (BP Location: Left arm, Patient Position: Lying)   Pulse 61   Temp 37.2 °C (99 °F) (Skin)   Resp 16   Ht 5' 9" (1.753 m)   Wt 131.1 kg (289 lb)   SpO2 95%   BMI 42.68 kg/m²     "

## 2020-01-30 VITALS
BODY MASS INDEX: 42.8 KG/M2 | OXYGEN SATURATION: 97 % | TEMPERATURE: 98 F | HEIGHT: 69 IN | WEIGHT: 289 LBS | SYSTOLIC BLOOD PRESSURE: 154 MMHG | DIASTOLIC BLOOD PRESSURE: 80 MMHG | HEART RATE: 62 BPM | RESPIRATION RATE: 18 BRPM

## 2020-02-03 ENCOUNTER — TELEPHONE (OUTPATIENT)
Dept: FAMILY MEDICINE | Facility: CLINIC | Age: 70
End: 2020-02-03

## 2020-02-03 NOTE — TELEPHONE ENCOUNTER
Called back without success. Number patient's daughter left can't completed phone call, LM in patient's daughter cell.i do not have any paperwork for patient. Patient will establish care on 20/10/2020 with Dr. Andrew.

## 2020-02-03 NOTE — TELEPHONE ENCOUNTER
----- Message from Samson Blevins sent at 2/3/2020  8:08 AM CST -----  Contact: Daughter, Britta Callejas want to make sure patient paperwork ready for eye doctor, will  today any questions please call back at [phone] ([phone]) [phone]

## 2020-02-04 ENCOUNTER — TELEPHONE (OUTPATIENT)
Dept: GASTROENTEROLOGY | Facility: CLINIC | Age: 70
End: 2020-02-04

## 2020-02-04 LAB
FINAL PATHOLOGIC DIAGNOSIS: NORMAL
GROSS: NORMAL

## 2020-02-04 NOTE — TELEPHONE ENCOUNTER
----- Message from Shakir Pham MD sent at 2/4/2020  4:13 PM CST -----  Please advise patient that polyp pathology was reviewed and is benign and is the adenomatous type which is precancerous/risk factor for cancer.  Repeat colonoscopy recommended in 3 years.  Place reminder in system for repeat colonoscopy.

## 2020-03-16 RX ORDER — ALLOPURINOL 300 MG/1
300 TABLET ORAL 2 TIMES DAILY
Qty: 180 TABLET | Refills: 0 | Status: SHIPPED | OUTPATIENT
Start: 2020-03-16

## 2020-03-18 RX ORDER — ATORVASTATIN CALCIUM 80 MG/1
TABLET, FILM COATED ORAL
Qty: 30 TABLET | Refills: 5 | OUTPATIENT
Start: 2020-03-18

## 2020-03-20 RX ORDER — LISINOPRIL 20 MG/1
TABLET ORAL
Qty: 90 TABLET | Refills: 3 | Status: SHIPPED | OUTPATIENT
Start: 2020-03-20

## 2024-03-14 NOTE — PROGRESS NOTES
HPI     Post-op Evaluation      Additional comments: 11 day check              Comments     DLS 3/21/19 OS watery. He ran out of the vigamox this morning. He missed his last appointment due to being at the ED for back pain    PF x 4  HA x 4  vigamox x 4  Bill qhs         Prior OCT - OD ME  OS  Fovea flat  Inferior macula fluid - some improvement        A/P    1. RRD OS with multiple breaks    s/p 25g PPV/AFx/EL/C3F8 OS for RRD 6/20/28 8/13/18 - Recurrent inferior RRD with PVR OS    s/p /270 25g PPV/membrane stripping/EL/AFx/1000cs Katt oil OS for RRD with PVR 8/15/18    10/18 - inferior RD beneath oil   12/18 -stable    s/p 25g PPV/SO removal/inferior laser retinopexy (from midphery to ora)/AFx/C3F8 OS for RRD 3/20/19    Recurrent RD beneath Gas    Cont PF/HA BID    Plan 25g PPV/inferior retinectomy/AFx/EL/5000cs OS  For REcurrent RRD with PVR    Local MAC  LOC 45 min    Risks, benefits, and alternatives to treatment discussed in detail with the patient.  The patient voiced understanding and wished to proceed with the procedure    2. PCIOL OU    3. PVD OD    4. DM  No DR  BS/BP/chol control       with patient

## (undated) DEVICE — SYR 1CC TB SG 27GX1/2

## (undated) DEVICE — SUT 7/0 18IN COATED VICRYL

## (undated) DEVICE — SYR 10CC LUER LOCK

## (undated) DEVICE — FORCEP GRIESHABER MAXGRIP 25G

## (undated) DEVICE — LENS VITRCTMY OPHTH 30DEG 59DE

## (undated) DEVICE — PROBE ILLUM FLEX CURVE LASER

## (undated) DEVICE — CORD FOR BIPOLAR FORCEPS 12

## (undated) DEVICE — SOL BSS BALANCED SALT

## (undated) DEVICE — TUBING MINIBORE EXTENSION

## (undated) DEVICE — SEE MEDLINE ITEM 157131

## (undated) DEVICE — KIT PERFLUOROCARBON LIQUID

## (undated) DEVICE — SOL BETADINE 5%

## (undated) DEVICE — FORCEP GRASPING 25GA SMOOTH

## (undated) DEVICE — BACKFLUSH 25GA SOFT-TIP DISP

## (undated) DEVICE — COVER MAYO STAND REINFRCD 30

## (undated) DEVICE — KNIFE OPHTH MICRO UNITOME 5MM

## (undated) DEVICE — CLOSURE SKIN STERI STRIP 1/2X4

## (undated) DEVICE — SHEILD & GARTERS FOX METAL EYE

## (undated) DEVICE — TRAY MUSCLE LID EYE

## (undated) DEVICE — GOWN SURGICAL X-LARGE

## (undated) DEVICE — SYR ONLY LEUR TIP 30CC

## (undated) DEVICE — NDL 22GA X1 1/2 REG BEVEL

## (undated) DEVICE — NEEDLE HYPODERMIC HUB LUER LOC

## (undated) DEVICE — SOL GONAK

## (undated) DEVICE — PACK TOTAL PLUS 25G VITRECTOMY

## (undated) DEVICE — GLOVE PROTEXIS PI CLASSIC 7.5

## (undated) DEVICE — PACK INSTRUMENT COVER DISPO

## (undated) DEVICE — SOL WATER STRL IRR 1000ML

## (undated) DEVICE — NDL SPINAL 22GX5

## (undated) DEVICE — SOL BALANCED SALT 500ML

## (undated) DEVICE — OIL SILICONE

## (undated) DEVICE — CANNULA OPTHALMIC SOF TIP 25G

## (undated) DEVICE — CONTAINER SPECIMEN STRL 4OZ

## (undated) DEVICE — KIT GREY EYE

## (undated) DEVICE — SYR DISP LL 5CC

## (undated) DEVICE — SEE MEDLINE ITEM 146372

## (undated) DEVICE — GLOVE BIOGEL ECLIPSE SZ 6.5

## (undated) DEVICE — DRESSING EYE OVAL LF

## (undated) DEVICE — SHIELD EYE METAL FOX 50/BX

## (undated) DEVICE — PACK INJ VISC FLD 20G/23G SIL

## (undated) DEVICE — GLOVE PROTEXIS PI CLASSIC 6.5

## (undated) DEVICE — NDL SAFETY 25G X 1.5 ECLIPSE

## (undated) DEVICE — SYRINGE 30CC LL W/O NDL

## (undated) DEVICE — SET EXTENSION 30 IN W/LL ROLLE

## (undated) DEVICE — SILICON 1000 8.5 ML

## (undated) DEVICE — NDL HYPODERMIC BLUNT 18G 1.5IN

## (undated) DEVICE — SYS LABEL CORRECT MED

## (undated) DEVICE — APPLICATOR CHLORAPREP CLR 10.5

## (undated) DEVICE — GLOVE PROTEXIS PI CLASSIC 6.0

## (undated) DEVICE — PACK AUTO GAS FILL